# Patient Record
Sex: FEMALE | Race: OTHER | Employment: OTHER | URBAN - METROPOLITAN AREA
[De-identification: names, ages, dates, MRNs, and addresses within clinical notes are randomized per-mention and may not be internally consistent; named-entity substitution may affect disease eponyms.]

---

## 2017-01-13 ENCOUNTER — OFFICE VISIT (OUTPATIENT)
Dept: INTERNAL MEDICINE CLINIC | Age: 77
End: 2017-01-13

## 2017-01-13 VITALS
DIASTOLIC BLOOD PRESSURE: 46 MMHG | SYSTOLIC BLOOD PRESSURE: 105 MMHG | BODY MASS INDEX: 23.53 KG/M2 | HEART RATE: 70 BPM | RESPIRATION RATE: 16 BRPM | WEIGHT: 132.8 LBS | HEIGHT: 63 IN | TEMPERATURE: 98.4 F | OXYGEN SATURATION: 98 %

## 2017-01-13 DIAGNOSIS — E03.1 CONGENITAL HYPOTHYROIDISM WITHOUT GOITER: ICD-10-CM

## 2017-01-13 DIAGNOSIS — I10 HYPERTENSION, ESSENTIAL, BENIGN: ICD-10-CM

## 2017-01-13 DIAGNOSIS — E11.9 TYPE 2 DIABETES MELLITUS WITHOUT COMPLICATION, WITHOUT LONG-TERM CURRENT USE OF INSULIN (HCC): Primary | ICD-10-CM

## 2017-01-13 NOTE — PROGRESS NOTES
SUBJECTIVE:   Ms. Shanika Nielsen is a 64 y.o. female who is here for follow up of htn, hld, and DM. Pt is accompanied by a friend today. Pt states her fasting blood glucose was 100 this morning and 94 yesterday. Pt reports occasionally checking her blood sugar in the evening. Pt claims she has not gained weight. Pt states she stays hungry despite eating a lot. Pt's weight has remained stable since 10/2016. Pt notes she used to weight 160 lbs. Pt's BP is well controlled at 105/46 today. Pt denies dizziness, but reports occasional lightheadedness. Pt reports she was only prescribed 2.5mg Lisinopril because she was already taking Metoprolol. Pt states she has a BP cuff at home. Pt endorses seeing Dr. Bao Mcdaniels (cardiology) on 10/18/16. Pt denies f/u for Holter monitor, but states she continues to have occasional palpitations. Pt's friend states someone told her she would wear the monitor for a few days, then someone else told her a few months, then the doctor said a few weeks. At this time, she is otherwise doing well and has brought no other complaints to my attention today. For a list of the medical issues addressed today, see the assessment and plan below. PMH:   Past Medical History   Diagnosis Date    Cancer ([facility]      skin    Diabetes ([facility]     Hypertension     Hypothyroidism 1993     PSH:  has a past surgical history that includes partial thyroidectomy (1993) and orthopaedic. All: has No Known Allergies. MEDS:   Current Outpatient Prescriptions   Medication Sig    levothyroxine (SYNTHROID) 50 mcg tablet TAKE 1 TABLET BY MOUTH DAILY BEFORE BREAKFAST    glipiZIDE SR (GLUCOTROL) 5 mg CR tablet Take 1 Tab by mouth daily.  metoprolol tartrate (LOPRESSOR) 50 mg tablet Take 1 Tab by mouth two (2) times a day.  glucose blood VI test strips (ACCU-CHEK ALIRIO PLUS TEST STRP) strip Check blood glucose levels twice daily.      No current facility-administered medications for this visit. FH: family history is not on file. SH:  reports that she has never smoked. She has never used smokeless tobacco. She reports that she does not drink alcohol or use illicit drugs. Review of Systems - History obtained from the patient  General ROS: negative  Psychological ROS: negative  Ophthalmic ROS: negative  ENT ROS: negative  Respiratory ROS: no cough, shortness of breath, or wheezing  Cardiovascular ROS: no chest pain or dyspnea on exertion  Gastrointestinal ROS: no abdominal pain, change in bowel habits, or black or bloody stools  Genito-Urinary ROS: negative  Musculoskeletal ROS: negative  Neurological ROS: negative  Dermatological ROS: negative    OBJECTIVE:   Vitals:   Visit Vitals    /46 (BP 1 Location: Left arm, BP Patient Position: Sitting)    Pulse 70    Temp 98.4 °F (36.9 °C) (Oral)    Resp 16    Ht 5' 3\" (1.6 m)    Wt 132 lb 12.8 oz (60.2 kg)    SpO2 98%    BMI 23.52 kg/m2      Gen: Pleasant 64 y.o.  female in NAD. HEENT: NC/AT. HEART: RRR, No M/G/R. LUNGS: CTAB No W/R. EXTREMITIES: Warm. No C/C/E. NEURO: Alert and oriented x 3. Cranial nerves grossly intact. No focal sensory or motor deficits noted. SKIN: Warm. Dry. No rashes or other lesions noted. ASSESSMENT/ PLAN:     Reilly Gonzalez was seen today for thyroid problem and diabetes. Diagnoses and all orders for this visit:    Type 2 diabetes mellitus without complication, without long-term current use of insulin (HCC)  -     METABOLIC PANEL, COMPREHENSIVE  -     MICROALBUMIN, UR, RAND W/ MICROALBUMIN/CREA RATIO  -     HEMOGLOBIN A1C WITH EAG    Hypertension, essential, benign  -     METABOLIC PANEL, COMPREHENSIVE    Congenital hypothyroidism without goiter  -     METABOLIC PANEL, COMPREHENSIVE  -     T4, FREE  -     TSH 3RD GENERATION      Pt was instructed to hold Lisinopril due to low BP.  I advised the pt to start checking her home BP twice daily at different time intervals and to report this data back to me so I can monitor her BP. Pt was advised to f/u with cardiology for Holter monitor due to palpitations. I ordered CMP, microalbumin, Hgb A1C, T4, and TSH labs for monitoring of hypothyroid, medication, and DM. Pt will f/u in 3 months for DM. Follow-up Disposition:  Return in about 3 months (around 4/13/2017) for follow up diabetes. I have reviewed the patient's medications and risks/side effects/benefits were discussed. Diagnosis(-es) explained to patient and questions answered. Literature provided where appropriate.      Written by Shirley Rivers, as dictated by Leslye Aponte MD.

## 2017-01-13 NOTE — PROGRESS NOTES
Pily Clemente is a 64 y.o. female  Chief Complaint   Patient presents with    Thyroid Problem    Diabetes     1. Have you been to an emergency room, urgent clinic, or hospitalized since your last visit? NO  If yes, where when, and reason for visit? 2. Have seen or consulted any other health care provider since your last visit? NO  Please include any pap smears or colon screening in this section  If yes, where when, and reason for visit? 6. Do you have an Advanced Directive/ Living Will in place?  YES  If yes, do we have a copy on file NO  If no, would you like information NO    Last eye exam: about 3 years  Last foot exam: no podiatry hx

## 2017-01-13 NOTE — MR AVS SNAPSHOT
Visit Information Date & Time Provider Department Dept. Phone Encounter #  
 1/13/2017 10:15 AM Renny Beavers, [address] [id number] Follow-up Instructions Return in about 3 months (around 4/13/2017) for follow up diabetes. Upcoming Health Maintenance Date Due  
 FOOT EXAM Q1 10/31/1950 EYE EXAM RETINAL OR DILATED Q1 10/31/1948 DTaP/Tdap/Td series (1 - Tdap) 10/31/1959 ZOSTER VACCINE AGE 60> 10/31/1998 GLAUCOMA SCREENING Q2Y 10/31/2003 OSTEOPOROSIS SCREENING (DEXA) 10/31/2003 Pneumococcal 65+ Low/Medium Risk (1 of 2 - PCV13) 10/31/2003 MICROALBUMIN Q1 12/4/2016 LIPID PANEL Q1 12/4/2016 HEMOGLOBIN A1C Q6M 1/26/2017 MEDICARE YEARLY EXAM 1/14/2017 Allergies as of 1/13/2017  Review Complete On: 1/13/2017 By: Renny Beavers MD  
 No Known Allergies Current Immunizations  Never Reviewed No immunizations on file. Not reviewed this visit You Were Diagnosed With   
  
 Codes Comments Type 2 diabetes mellitus without complication, without long-term current use of insulin (HCC)    -  Primary ICD-10-CM: E11.9 ICD-9-CM: 250.00 Hypertension, essential, benign     ICD-10-CM: I10 
ICD-9-CM: 401.1 Congenital hypothyroidism without goiter     ICD-10-CM: E03.1 ICD-9-CM: 540 Vitals BP Pulse Temp Resp Height(growth percentile) Weight(growth percentile) 105/46 (BP 1 Location: Left arm, BP Patient Position: Sitting) 70 98.4 °F (36.9 °C) (Oral) 16 5' 3\" (1.6 m) 132 lb 12.8 oz (60.2 kg) SpO2 BMI OB Status Smoking Status 98% 23.52 kg/m2 Postmenopausal Never Smoker BMI and BSA Data Body Mass Index Body Surface Area  
 23.52 kg/m 2 1.64 m 2 Preferred Pharmacy Pharmacy Name Phone [facility] [address]  [place] [phone] Your Updated Medication List  
  
   
 This list is accurate as of: 1/13/17 11:16 AM.  Always use your most recent med list.  
  
  
  
  
 glipiZIDE SR 5 mg CR tablet Commonly known as:  GLUCOTROL XL Take 1 Tab by mouth daily. glucose blood VI test strips strip Commonly known as:  ACCU-CHEK ALIRIO PLUS TEST STRP Check blood glucose levels twice daily. levothyroxine 50 mcg tablet Commonly known as:  SYNTHROID  
TAKE 1 TABLET BY MOUTH DAILY BEFORE BREAKFAST  
  
 metoprolol tartrate 50 mg tablet Commonly known as:  LOPRESSOR Take 1 Tab by mouth two (2) times a day. We Performed the Following HEMOGLOBIN A1C WITH EAG [60038 CPT(R)] METABOLIC PANEL, COMPREHENSIVE [86954 CPT(R)] MICROALBUMIN, UR, RAND W/ MICROALBUMIN/CREA RATIO [id number] CPT(R)] T4, FREE S993587 CPT(R)] TSH 3RD GENERATION [39388 CPT(R)] Follow-up Instructions Return in about 3 months (around 4/13/2017) for follow up diabetes. Introducing [facility]! [facility] introduces [employer] patient portal. Now you can access parts of your medical record, email your doctor's office, and request medication refills online. 1. In your internet browser, go to [email or web address] 2. Click on the First Time User? Click Here link in the Sign In box. You will see the New Member Sign Up page. 3. Enter your [employer] Access Code exactly as it appears below. You will not need to use this code after youve completed the sign-up process. If you do not sign up before the expiration date, you must request a new code. · [employer] Access Code: [email or web address] Expires: 1/16/2017  8:59 AM 
 
4. Enter the last four digits of your Social Security Number (xxxx) and Date of Birth (mm/dd/yyyy) as indicated and click Submit. You will be taken to the next sign-up page. 5. Create a [employer] ID. This will be your [employer] login ID and cannot be changed, so think of one that is secure and easy to remember. 6. Create a [employer] password. You can change your password at any time. 7. Enter your Password Reset Question and Answer. This can be used at a later time if you forget your password. 8. Enter your e-mail address. You will receive e-mail notification when new information is available in [address] 9. Click Sign Up. You can now view and download portions of your medical record. 10. Click the Download Summary menu link to download a portable copy of your medical information. If you have questions, please visit the Frequently Asked Questions section of the [employer] website. Remember, [employer] is NOT to be used for urgent needs. For medical emergencies, dial 911. Now available from your iPhone and Android! Please provide this summary of care documentation to your next provider. Your primary care clinician is listed as Alfredo Thuan. If you have any questions after today's visit, please call [phone].

## 2017-01-14 LAB
ALBUMIN SERPL-MCNC: 4 G/DL (ref 3.5–4.8)
ALBUMIN/CREAT UR: 7.4 MG/G CREAT (ref 0–30)
ALBUMIN/GLOB SERPL: 1.5 {RATIO} (ref 1.1–2.5)
ALP SERPL-CCNC: 67 IU/L (ref 39–117)
ALT SERPL-CCNC: 19 IU/L (ref 0–32)
AST SERPL-CCNC: 18 IU/L (ref 0–40)
BILIRUB SERPL-MCNC: <0.2 MG/DL (ref 0–1.2)
BUN SERPL-MCNC: 19 MG/DL (ref 8–27)
BUN/CREAT SERPL: 33 (ref 11–26)
CALCIUM SERPL-MCNC: 10.1 MG/DL (ref 8.7–10.3)
CHLORIDE SERPL-SCNC: 102 MMOL/L (ref 96–106)
CO2 SERPL-SCNC: 24 MMOL/L (ref 18–29)
CREAT SERPL-MCNC: 0.58 MG/DL (ref 0.57–1)
CREAT UR-MCNC: 84 MG/DL
EST. AVERAGE GLUCOSE BLD GHB EST-MCNC: 194 MG/DL
GLOBULIN SER CALC-MCNC: 2.7 G/DL (ref 1.5–4.5)
GLUCOSE SERPL-MCNC: 211 MG/DL (ref 65–99)
HBA1C MFR BLD: 8.4 % (ref 4.8–5.6)
MICROALBUMIN UR-MCNC: 6.2 UG/ML
POTASSIUM SERPL-SCNC: 4.4 MMOL/L (ref 3.5–5.2)
PROT SERPL-MCNC: 6.7 G/DL (ref 6–8.5)
SODIUM SERPL-SCNC: 141 MMOL/L (ref 134–144)
T4 FREE SERPL-MCNC: 1.54 NG/DL (ref 0.82–1.77)
TSH SERPL DL<=0.005 MIU/L-ACNC: 0.16 UIU/ML (ref 0.45–4.5)

## 2017-01-17 ENCOUNTER — TELEPHONE (OUTPATIENT)
Dept: INTERNAL MEDICINE CLINIC | Age: 77
End: 2017-01-17

## 2017-01-17 NOTE — TELEPHONE ENCOUNTER
Per PCP, patient's family needs to confirm that patient has cardiology appointment. Only person listed on HIPAA form is Alethatin Morales. There is no number listed for Stevenson Regalado. Called home number 2x. No answer. Left voicemail advising to call back.

## 2017-01-17 NOTE — TELEPHONE ENCOUNTER
Patient returned RN's call. Stated repeatedly that she would be calling Dr. Tash Erwin for appointment.

## 2017-01-20 NOTE — PROGRESS NOTES
Please call this patient and review the results. Normal electrolyte levels except for a elevation in glucose levels, normal renal, and liver function. Thyroid- patient has a normal T4 and a low TSH. Please advise patient to take 1/2 tab of her synthroid on Sat and Sun. A whole tab daily the rest of the week. Your current hgbA1c is slightly higher than your last level of 8.2. Increase glipizide to 1 1/2 tabs daily ( 7.5 mg). Work on following a diabetic diet and take medication daily. Recheck this test: hgbA1c  in  3 months. Continue with current  medications.

## 2017-01-24 NOTE — PROGRESS NOTES
Reviewed results with patient per Dr. Jacobo Kelly. I have reviewed the provider's instructions with the patient, answering all questions to her satisfaction.

## 2017-02-01 DIAGNOSIS — E03.1 CONGENITAL HYPOTHYROIDISM WITHOUT GOITER: ICD-10-CM

## 2017-02-01 RX ORDER — LEVOTHYROXINE SODIUM 50 UG/1
TABLET ORAL
Qty: 90 TAB | Refills: 0 | Status: SHIPPED | OUTPATIENT
Start: 2017-02-01 | End: 2017-05-24 | Stop reason: SDUPTHER

## 2017-02-17 DIAGNOSIS — R00.2 PALPITATIONS: ICD-10-CM

## 2017-02-17 DIAGNOSIS — I10 ESSENTIAL HYPERTENSION: Primary | ICD-10-CM

## 2017-02-20 ENCOUNTER — HOSPITAL ENCOUNTER (OUTPATIENT)
Dept: NON INVASIVE DIAGNOSTICS | Age: 77
Discharge: HOME | End: 2017-02-20
Attending: PHYSICIAN ASSISTANT
Payer: MEDICARE

## 2017-02-20 ENCOUNTER — TELEPHONE (OUTPATIENT)
Dept: CARDIOLOGY CLINIC | Age: 77
End: 2017-02-20

## 2017-02-20 DIAGNOSIS — R00.2 PALPITATIONS: ICD-10-CM

## 2017-02-20 DIAGNOSIS — I10 ESSENTIAL HYPERTENSION: ICD-10-CM

## 2017-02-20 PROCEDURE — 93270 REMOTE 30 DAY ECG REV/REPORT: CPT

## 2017-02-20 NOTE — TELEPHONE ENCOUNTER
Patient returned call from 11/14/17 regarding incomplete Event Recorder. Patient stated \"she's scheduled for an appointment for 2/20/17 at 2:15 pm\".

## 2017-03-24 NOTE — PROCEDURES
[address]       Name:  Thea Barry   MR#:  [id number]   :  1940   Account #:  [de-identified]        Date of Adm:  2017       REASON FOR EVENT MONITOR: Palpitations. 1. An event monitor was obtained from 2017 to 2017. The   initial rhythm strip revealed sinus rhythm on 2017 at 14:56.   2. A rhythm strip was obtained on 2017 at 11:54 a.m. The   rhythm strip revealed sinus rhythm ranging from 90 to 115 beats per   minute. There was atrial bigeminy over 6 seconds. 3. The rhythm strip was obtained at 11:51 on 2017. The rhythm   strip revealed sinus tachycardia of 115 beats per minute in a single   ventricular premature contraction versus an aberrant atrial premature   contractions. 4. A rhythm strip was obtained on 2017 11:53 a.m. The rhythm   strip revealed sinus rhythm at 75 beats per minute, and atrial couplets   and an atrial triplet. 5. The patient's symptoms of palpitations corresponded to the atrial   ectopy.          MD REJI Rose / Reilly Metzger   D:  2017   10:39   T:  2017   11:08   Job #:  [id number]

## 2017-04-05 ENCOUNTER — OFFICE VISIT (OUTPATIENT)
Dept: CARDIOLOGY CLINIC | Age: 77
End: 2017-04-05

## 2017-04-05 VITALS
WEIGHT: 130 LBS | HEIGHT: 63 IN | BODY MASS INDEX: 23.04 KG/M2 | DIASTOLIC BLOOD PRESSURE: 67 MMHG | SYSTOLIC BLOOD PRESSURE: 147 MMHG | HEART RATE: 97 BPM | OXYGEN SATURATION: 97 %

## 2017-04-05 DIAGNOSIS — R00.2 INTERMITTENT PALPITATIONS: ICD-10-CM

## 2017-04-05 DIAGNOSIS — I10 HYPERTENSION, ESSENTIAL, BENIGN: ICD-10-CM

## 2017-04-05 DIAGNOSIS — R09.89 LABILE BLOOD PRESSURE: Primary | ICD-10-CM

## 2017-04-05 DIAGNOSIS — E11.9 TYPE 2 DIABETES MELLITUS WITHOUT COMPLICATION, WITHOUT LONG-TERM CURRENT USE OF INSULIN (HCC): ICD-10-CM

## 2017-04-05 NOTE — PROGRESS NOTES
[facility]   [address] [facility], [address] [place] [address]                                          NEW PATIENT HPI/FOLLOW-UP      NAME:  Анна Pal   :   1940   MRN:   [id number]   PCP:  Laila Weeks MD           Subjective: The patient is a 64y.o. year old female  who returns for a routine follow-up re: results of event monitoring. Since the last visit, patient reports she still has occasional palpitations. She notes BPs that are high and low and feels lightheaded with vision changes at times. States her PCP has taken her off of BP medications and is readjusting her Synthroid due to recent labs. She admits to heavy consumption of caffeine; mostly tea and coffee. Denies change in exercise tolerance, chest pain, edema, medication intolerance,  shortness of breath, PND/orthopnea wheezing, sputum, or syncope. Doing satisfactorily. Review of Systems  General: Pt denies excessive weight gain or loss. Pt is able to conduct ADL's. Respiratory: Denies shortness of breath, [place], wheezing or stridor.   Cardiovascular: +palpitations Denies precordial pain, , edema or PND  Gastrointestinal: Denies poor appetite, indigestion, abdominal pain or blood in stool  Peripheral vascular: Denies claudication, leg cramps  Neuropsychiatric: Denies paresthesias,tingling,numbness,anxiety,depression,fatigue  Musculoskeletal: Denies pain,tenderness, soreness,swelling      Past Medical History:   Diagnosis Date    Cancer ([address]     skin    Diabetes ([address]     Hypertension     Hypothyroidism      Patient Active Problem List    Diagnosis Date Noted    Unintended weight loss 10/18/2016    Hypertension, essential, benign 2015    Type 2 diabetes mellitus without complication ([address]     Congenital hypothyroidism without goiter 2015      Past Surgical History:   Procedure Laterality Date    HX ORTHOPAEDIC      rotator cuff repair- left    HX PARTIAL THYROIDECTOMY   No Known Allergies   No family history on file. Social History     Social History    Marital status:      Spouse name: N/A    Number of children: N/A    Years of education: N/A     Occupational History    Not on file. Social History Main Topics    Smoking status: Never Smoker    Smokeless tobacco: Never Used    Alcohol use No    Drug use: No    Sexual activity: No     Other Topics Concern    Not on file     Social History Narrative      Current Outpatient Prescriptions   Medication Sig    levothyroxine (SYNTHROID) 50 mcg tablet TAKE 1 TABLET BY MOUTH DAILY BEFORE BREAKFAST    glipiZIDE SR (GLUCOTROL) 5 mg CR tablet Take 1 Tab by mouth daily.  glucose blood VI test strips (ACCU-CHEK ALIRIO PLUS TEST STRP) strip Check blood glucose levels twice daily.  metoprolol tartrate (LOPRESSOR) 50 mg tablet Take 1 Tab by mouth two (2) times a day. No current facility-administered medications for this visit. I have reviewed the MAs notes, vitals, problem list, allergy list, medical history, family medical, social history and medications. Objective:     Physical Exam:     Vitals:    04/05/17 1125   BP: 147/67   Pulse: 97   SpO2: 97%   Weight: 130 lb (59 kg)   Height: 5' 3\" (1.6 m)    Body mass index is 23.03 kg/(m^2). General: WDWN elderly female,thin,frail in no acute distress. Pleasant affect. HEENT: No carotid bruits, no JVD, trach is midline. Heart:  Normal S1/S2 negative S3 or S4. Regular, no murmur, gallop or rub.   Respiratory: Clear bilaterally, no wheezing or rales  Abdomen:   Soft, non-tender, bowel sounds are active.   Extremities:  No edema, normal cap refill, no cyanosis. Neuro: A&Ox3, speech clear, unsteady gait. Skin: Skin color is normal. No rashes or lesions. No diaphoresis.   Vascular: 2+ pulses symmetric in all extremities      Data Review:       Cardiographics:    EKG: None today    Cardiology Labs:    No results found for this or any previous visit. Lab Results   Component Value Date/Time    Cholesterol, total 230 12/04/2015 09:55 AM    HDL Cholesterol 52 12/04/2015 09:55 AM    LDL, calculated 168 12/04/2015 09:55 AM    Triglyceride 49 12/04/2015 09:55 AM       Lab Results   Component Value Date/Time    Sodium 141 01/13/2017 11:25 AM    Potassium 4.4 01/13/2017 11:25 AM    Chloride 102 01/13/2017 11:25 AM    CO2 24 01/13/2017 11:25 AM    Glucose 211 01/13/2017 11:25 AM    BUN 19 01/13/2017 11:25 AM    Creatinine 0.58 01/13/2017 11:25 AM    BUN/Creatinine ratio 33 01/13/2017 11:25 AM    GFR est  01/13/2017 11:25 AM    GFR est non-AA 89 01/13/2017 11:25 AM    Calcium 10.1 01/13/2017 11:25 AM    Bilirubin, total <0.2 01/13/2017 11:25 AM    AST (SGOT) 18 01/13/2017 11:25 AM    Alk. phosphatase 67 01/13/2017 11:25 AM    Protein, total 6.7 01/13/2017 11:25 AM    Albumin 4.0 01/13/2017 11:25 AM    A-G Ratio 1.5 01/13/2017 11:25 AM    ALT (SGPT) 19 01/13/2017 11:25 AM          Assessment:       ICD-10-CM ICD-9-CM    1. Labile blood pressure R09.89 796.2    2. Type 2 diabetes mellitus without complication, without long-term current use of insulin (HCC) E11.9 250.00    3. Intermittent palpitations R00.2 785.1    4. Hypertension, essential, benign I10 401.1          Discussion: Patient presents at this time stable from a cardiac perspective. Event monitoring showed atrial ectopy coinciding with symptoms and one episode of sinus tachycardia (114 bpm) with single ectopic beat. Labile blood pressures noted on home monitoring. Pt has been removed from BP meds by PCP and will follow up with her next week to readjust thyroid medications. Likely suffering from autonomic dysfunction. Reassured along with family member that it better to allow relatively wide swings in SBP(brought diary with her) range 116 to 160's to avoid symptomatic orthostatic changes due to\" stiff \" blood vessels due to age and associated co-morbidities. Did not tolerate BB.  If much high then low dose ARB or amlodipine as vasodilator. PAC's benign and non-consequential on EM. Advised continued control of DM and dyslipidemia. Plan: 1. Continue same meds. Lipid profile and labs followed by PCP. -- May benefit from endocrinology evaluation    2. Encouraged to exercise to tolerance, lose weight, and follow low fat, low cholesterol, low sodium predominantly Plant-based (consider Mediterranean) diet. Call with questions or concerns. Will follow up any test results by phone and/or f/u here in office if needed. Sienna Blanco 3.Follow up: 6 months and if stable - 1 year as pt concerned about co-pay. I have discussed the diagnosis with the patient and the intended plan as seen in the above orders. The patient has received an after-visit summary and questions were answered concerning future plans. I have discussed any concerning medication side effects and warnings with the patient as well. Patient seen and examined. All pertinent data reviewed. I have reviewed detailed note as outlined by Sonal Castillo. Case discussed with Nursing/medical assistant staff and Sonal Castillo. Patient with labile BP's due to \"non-pliable\"  blood vessels in association with autonomic dysfunction due to DM. Would favor hand-off approach for now as discussed with patient and family member. PAC's benign and non-lethal though bothersome and a nuisance. Have suggested decaff beverages and note response. Call with response. Plans as outlined.     Cj Dawson PA-C  4/5/2017     JENNIFER Juan

## 2017-04-05 NOTE — PATIENT INSTRUCTIONS
-- Please continue your planned follow up with Dr. Naveen Coleman  -- We will see you back here in 1 year       Palpitations: Care Instructions  Your Care Instructions    Heart palpitations are the uncomfortable sensation that your heart is beating fast or irregularly. You might feel pounding or fluttering in your chest. It might feel like your heart is skipping a beat. Although palpitations may be caused by a heart problem, they also occur because of stress, fatigue, or use of alcohol, caffeine, or nicotine. Many medicines, including diet pills, antihistamines, decongestants, and some herbal products, can cause heart palpitations. Nearly everyone has palpitations from time to time. Depending on your symptoms, your doctor may need to do more tests to try to find the cause of your palpitations. Follow-up care is a key part of your treatment and safety. Be sure to make and go to all appointments, and call your doctor if you are having problems. It's also a good idea to know your test results and keep a list of the medicines you take. How can you care for yourself at home? · Avoid caffeine, nicotine, and excess alcohol. · Do not take illegal drugs, such as methamphetamines and cocaine. · Do not take weight loss or diet medicines unless you talk with your doctor first.  · Get plenty of sleep. · Do not overeat. · If you have palpitations again, take deep breaths and try to relax. This may slow a racing heart. · If you start to feel lightheaded, lie down to avoid injuries that might result if you pass out and fall down. · Keep a record of your palpitations and bring it to your next doctor's appointment. Write down:  ¨ The date and time. ¨ Your pulse. (If your heart is beating fast, it may be hard to count your pulse.)  ¨ What you were doing when the palpitations started. ¨ How long the palpitations lasted. ¨ Any other symptoms. · If an activity causes palpitations, slow down or stop.  Talk to your doctor before you do that activity again. · Take your medicines exactly as prescribed. Call your doctor if you think you are having a problem with your medicine. When should you call for help? Call 911 anytime you think you may need emergency care. For example, call if:  · You passed out (lost consciousness). · You have symptoms of a heart attack. These may include:  ¨ Chest pain or pressure, or a strange feeling in the chest.  ¨ Sweating. ¨ Shortness of breath. ¨ Pain, pressure, or a strange feeling in the back, neck, jaw, or upper belly or in one or both shoulders or arms. ¨ Lightheadedness or sudden weakness. ¨ A fast or irregular heartbeat. After you call 911, the  may tell you to chew 1 adult-strength or 2 to 4 low-dose aspirin. Wait for an ambulance. Do not try to drive yourself. · You have symptoms of a stroke. These may include:  ¨ Sudden numbness, tingling, weakness, or loss of movement in your face, arm, or leg, especially on only one side of your body. ¨ Sudden vision changes. ¨ Sudden trouble speaking. ¨ Sudden confusion or trouble understanding simple statements. ¨ Sudden problems with walking or balance. ¨ A sudden, severe headache that is different from past headaches. Call your doctor now or seek immediate medical care if:  · You have heart palpitations and:  ¨ Are dizzy or lightheaded, or you feel like you may faint. ¨ Have new or increased shortness of breath. Watch closely for changes in your health, and be sure to contact your doctor if:  · You continue to have heart palpitations. Where can you learn more? Go to http://alejandrina-vee.info/. Enter R508 in the search box to learn more about \"Palpitations: Care Instructions. \"  Current as of: January 27, 2016  Content Version: 11.2  © 7230-2220 [employer]. Care instructions adapted under license by [employer] (which disclaims liability or warranty for this information).  If you have questions about a medical condition or this instruction, always ask your healthcare professional. [place] [address] any warranty or liability for your use of this information.

## 2017-04-05 NOTE — MR AVS SNAPSHOT
Visit Information Date & Time Provider Department Dept. Phone Encounter #  
 4/5/2017 10:45 AM MD Telly Singh [facility] [place] [address] [id number] Your Appointments 4/12/2017 11:45 AM  
ROUTINE CARE with Pierre Rosas MD  
[facility]) Appt Note: 3 month follow up  
 [facility] [address]  
  
   
 [facility] [address] [facility]  [address] [place]  
  
    
 10/10/2017 10:00 AM  
ESTABLISHED PATIENT with MD Telly Singh [facility]) Appt Note: 6 MO. F/U  
 [address] [place]  
[phone] [address] Upcoming Health Maintenance Date Due  
 FOOT EXAM Q1 10/31/1950 EYE EXAM RETINAL OR DILATED Q1 10/31/1948 DTaP/Tdap/Td series (1 - Tdap) 10/31/1959 ZOSTER VACCINE AGE 60> 10/31/1998 GLAUCOMA SCREENING Q2Y 10/31/2003 OSTEOPOROSIS SCREENING (DEXA) 10/31/2003 Pneumococcal 65+ Low/Medium Risk (1 of 2 - PCV13) 10/31/2003 LIPID PANEL Q1 12/4/2016 MEDICARE YEARLY EXAM 1/14/2017 HEMOGLOBIN A1C Q6M 7/13/2017 MICROALBUMIN Q1 1/13/2018 Allergies as of 4/5/2017  Review Complete On: 1/13/2017 By: Pierre Rosas MD  
 No Known Allergies Current Immunizations  Never Reviewed No immunizations on file. Not reviewed this visit You Were Diagnosed With   
  
 Codes Comments Labile blood pressure    -  Primary ICD-10-CM: R09.89 ICD-9-CM: 796.2 Type 2 diabetes mellitus without complication, without long-term current use of insulin (HCC)     ICD-10-CM: E11.9 ICD-9-CM: 250.00 Intermittent palpitations     ICD-10-CM: R00.2 ICD-9-CM: 785.1 Vitals BP Pulse Height(growth percentile) Weight(growth percentile) SpO2 BMI 147/67 97 5' 3\" (1.6 m) 130 lb (59 kg) 97% 23.03 kg/m2 OB Status Smoking Status Postmenopausal Never Smoker Vitals History BMI and BSA Data Body Mass Index Body Surface Area 23.03 kg/m 2 1.62 m 2 Preferred Pharmacy Pharmacy Name Phone Kalyan Duarte [address]  [place] [phone] Your Updated Medication List  
  
   
This list is accurate as of: 4/5/17 12:16 PM.  Always use your most recent med list.  
  
  
  
  
 glipiZIDE SR 5 mg CR tablet Commonly known as:  GLUCOTROL XL Take 1 Tab by mouth daily. glucose blood VI test strips strip Commonly known as:  ACCU-CHEK ALIRIO PLUS TEST STRP Check blood glucose levels twice daily. levothyroxine 50 mcg tablet Commonly known as:  SYNTHROID  
TAKE 1 TABLET BY MOUTH DAILY BEFORE BREAKFAST  
  
 metoprolol tartrate 50 mg tablet Commonly known as:  LOPRESSOR Take 1 Tab by mouth two (2) times a day. Patient Instructions -- Please continue your planned follow up with Dr. Issac Early 
-- We will see you back here in 1 year Palpitations: Care Instructions Your Care Instructions Heart palpitations are the uncomfortable sensation that your heart is beating fast or irregularly. You might feel pounding or fluttering in your chest. It might feel like your heart is skipping a beat. Although palpitations may be caused by a heart problem, they also occur because of stress, fatigue, or use of alcohol, caffeine, or nicotine. Many medicines, including diet pills, antihistamines, decongestants, and some herbal products, can cause heart palpitations. Nearly everyone has palpitations from time to time. Depending on your symptoms, your doctor may need to do more tests to try to find the cause of your palpitations. Follow-up care is a key part of your treatment and safety.  Be sure to make and go to all appointments, and call your doctor if you are having problems. It's also a good idea to know your test results and keep a list of the medicines you take. How can you care for yourself at home? · Avoid caffeine, nicotine, and excess alcohol. · Do not take illegal drugs, such as methamphetamines and cocaine. · Do not take weight loss or diet medicines unless you talk with your doctor first. 
· Get plenty of sleep. · Do not overeat. · If you have palpitations again, take deep breaths and try to relax. This may slow a racing heart. · If you start to feel lightheaded, lie down to avoid injuries that might result if you pass out and fall down. · Keep a record of your palpitations and bring it to your next doctor's appointment. Write down: ¨ The date and time. ¨ Your pulse. (If your heart is beating fast, it may be hard to count your pulse.) ¨ What you were doing when the palpitations started. ¨ How long the palpitations lasted. ¨ Any other symptoms. · If an activity causes palpitations, slow down or stop. Talk to your doctor before you do that activity again. · Take your medicines exactly as prescribed. Call your doctor if you think you are having a problem with your medicine. When should you call for help? Call 911 anytime you think you may need emergency care. For example, call if: 
· You passed out (lost consciousness). · You have symptoms of a heart attack. These may include: ¨ Chest pain or pressure, or a strange feeling in the chest. 
¨ Sweating. ¨ Shortness of breath. ¨ Pain, pressure, or a strange feeling in the back, neck, jaw, or upper belly or in one or both shoulders or arms. ¨ Lightheadedness or sudden weakness. ¨ A fast or irregular heartbeat. After you call 911, the  may tell you to chew 1 adult-strength or 2 to 4 low-dose aspirin. Wait for an ambulance. Do not try to drive yourself. · You have symptoms of a stroke. These may include: ¨ Sudden numbness, tingling, weakness, or loss of movement in your face, arm, or leg, especially on only one side of your body. ¨ Sudden vision changes. ¨ Sudden trouble speaking. ¨ Sudden confusion or trouble understanding simple statements. ¨ Sudden problems with walking or balance. ¨ A sudden, severe headache that is different from past headaches. Call your doctor now or seek immediate medical care if: 
· You have heart palpitations and: ¨ Are dizzy or lightheaded, or you feel like you may faint. ¨ Have new or increased shortness of breath. Watch closely for changes in your health, and be sure to contact your doctor if: 
· You continue to have heart palpitations. Where can you learn more? Go to http://alejandrina-vee.info/. Enter R508 in the search box to learn more about \"Palpitations: Care Instructions. \" Current as of: January 27, 2016 Content Version: 11.2 © 7554-7962 [employer]. Care instructions adapted under license by [employer] (which disclaims liability or warranty for this information). If you have questions about a medical condition or this instruction, always ask your healthcare professional. [place] [address] any warranty or liability for your use of this information. Introducing [facility] & [facility]! oSledad Porter introduces Forward Talent patient portal. Now you can access parts of your medical record, email your doctor's office, and request medication refills online. 1. In your internet browser, go to [email or web address] 2. Click on the First Time User? Click Here link in the Sign In box. You will see the New Member Sign Up page. 3. Enter your Forward Talent Access Code exactly as it appears below. You will not need to use this code after youve completed the sign-up process. If you do not sign up before the expiration date, you must request a new code.  
 
· [employer] Access Code: [email or web address] 
 Expires: 5/14/2017  1:33 PM 
 
4. Enter the last four digits of your Social Security Number (xxxx) and Date of Birth (mm/dd/yyyy) as indicated and click Submit. You will be taken to the next sign-up page. 5. Create a LinguaSys ID. This will be your LinguaSys login ID and cannot be changed, so think of one that is secure and easy to remember. 6. Create a LinguaSys password. You can change your password at any time. 7. Enter your Password Reset Question and Answer. This can be used at a later time if you forget your password. 8. Enter your e-mail address. You will receive e-mail notification when new information is available in [address] 9. Click Sign Up. You can now view and download portions of your medical record. 10. Click the Download Summary menu link to download a portable copy of your medical information. If you have questions, please visit the Frequently Asked Questions section of the LinguaSys website. Remember, LinguaSys is NOT to be used for urgent needs. For medical emergencies, dial 911. Now available from your iPhone and Android! Please provide this summary of care documentation to your next provider. Your primary care clinician is listed as Tabatha Magana. If you have any questions after today's visit, please call [phone].

## 2017-04-12 ENCOUNTER — OFFICE VISIT (OUTPATIENT)
Dept: INTERNAL MEDICINE CLINIC | Age: 77
End: 2017-04-12

## 2017-04-12 VITALS
OXYGEN SATURATION: 98 % | HEART RATE: 88 BPM | WEIGHT: 127 LBS | DIASTOLIC BLOOD PRESSURE: 68 MMHG | SYSTOLIC BLOOD PRESSURE: 130 MMHG | TEMPERATURE: 98.6 F | HEIGHT: 63 IN | BODY MASS INDEX: 22.5 KG/M2 | RESPIRATION RATE: 18 BRPM

## 2017-04-12 DIAGNOSIS — E03.1 CONGENITAL HYPOTHYROIDISM WITHOUT GOITER: ICD-10-CM

## 2017-04-12 DIAGNOSIS — E78.2 MIXED HYPERLIPIDEMIA: ICD-10-CM

## 2017-04-12 DIAGNOSIS — E11.9 TYPE 2 DIABETES MELLITUS WITHOUT COMPLICATION, WITHOUT LONG-TERM CURRENT USE OF INSULIN (HCC): Primary | ICD-10-CM

## 2017-04-12 NOTE — PROGRESS NOTES
SUBJECTIVE:   Ms. Wally Goodell is a 64 y.o. female who is here for follow up of DM. Pt is accompanied by a family member today. Pt ate this morning. Pt's BP is elevated at 157/73 but 130/68 upon recheck today. Pt's TSH was 0.163 on 1/13/17. Pt was instructed to take 1/2 tablet of Synthroid on Saturday and Sunday. Pt denies palpitations. Pt has lost 3 lbs since 4/5/17. Pt claims she is constantly hungry. Pt claims she has been eating the same as she always has. Pt reports working in her yard some. Pt endorses drinking water. Pt's Hgb A1C was 8.2 on 1/13/17. At this time, she is otherwise doing well and has brought no other complaints to my attention today. For a list of the medical issues addressed today, see the assessment and plan below. PMH:   Past Medical History:   Diagnosis Date    Cancer ([facility] [facility]     skin    Diabetes ([facility] [facility]     Hypertension     Hypothyroidism 1993     PSH:  has a past surgical history that includes partial thyroidectomy (1993) and orthopaedic. All: has No Known Allergies. MEDS:   Current Outpatient Prescriptions   Medication Sig    levothyroxine (SYNTHROID) 50 mcg tablet TAKE 1 TABLET BY MOUTH DAILY BEFORE BREAKFAST    glipiZIDE SR (GLUCOTROL) 5 mg CR tablet Take 1 Tab by mouth daily.  glucose blood VI test strips (ACCU-CHEK ALIRIO PLUS TEST STRP) strip Check blood glucose levels twice daily.  metoprolol tartrate (LOPRESSOR) 50 mg tablet Take 1 Tab by mouth two (2) times a day. No current facility-administered medications for this visit. FH: family history is not on file. SH:  reports that she has never smoked. She has never used smokeless tobacco. She reports that she does not drink alcohol or use illicit drugs.      Review of Systems - History obtained from the patient  General ROS: negative  Psychological ROS: negative  Ophthalmic ROS: negative  ENT ROS: negative  Respiratory ROS: no cough, shortness of breath, or wheezing  Cardiovascular ROS: no chest pain or dyspnea on exertion  Gastrointestinal ROS: no abdominal pain, change in bowel habits, or black or bloody stools  Genito-Urinary ROS: negative  Musculoskeletal ROS: negative  Neurological ROS: negative  Dermatological ROS: negative    OBJECTIVE:   Vitals:   Visit Vitals    /73    Pulse 88    Temp 98.6 °F (37 °C) (Oral)    Resp 18    Ht 5' 3\" (1.6 m)    Wt 127 lb (57.6 kg)    SpO2 98%    BMI 22.5 kg/m2      Gen: Pleasant 64 y.o.  female in NAD. HEENT: NC/AT. HEART: RRR, No M/G/R. LUNGS: CTAB No W/R. EXTREMITIES: Warm. No C/C/E. NEURO: Alert and oriented x 3. Cranial nerves grossly intact. No focal sensory or motor deficits noted. SKIN: Warm. Dry. No rashes or other lesions noted. ASSESSMENT/ PLAN:   Ghislaine Jett was seen today for diabetes, thyroid problem and other. Diagnoses and all orders for this visit:    Type 2 diabetes mellitus without complication, without long-term current use of insulin (HCC)  -     HEMOGLOBIN A1C WITH EAG    Congenital hypothyroidism without goiter  -     TSH 3RD GENERATION  -     T4, FREE  -     T3 TOTAL    Mixed hyperlipidemia  -     LIPID PANEL      I ordered T4, TSH, and T3 labs for monitoring of elevated TSH. If pt's thyroid levels continue to be abnormal, then I will refer pt to endocrinology. This patient's blood pressure is stable and controlled on the current medication. Continue the current regimen. I ordered an Hgb A1C lab for monitoring of DM. Pt was given a lab order for lipid panel to be done when she is fasting. Pt will f/u in 6 months for DM and hypothyroid. Follow-up Disposition:  Return in about 6 months (around 10/12/2017) for follow up hypothyroid and diabetes. I have reviewed the patient's medications and risks/side effects/benefits were discussed. Diagnosis(-es) explained to patient and questions answered. Literature provided where appropriate.      Written by Cherelle Webber, as dictated by Tiana Mary, MD.

## 2017-04-12 NOTE — MR AVS SNAPSHOT
Visit Information Date & Time Provider Department Dept. Phone Encounter #  
 4/12/2017 11:45 AM Jhon Rider, [address] [id number] Follow-up Instructions Return in about 6 months (around 10/12/2017) for follow up hypothyroid and diabetes. Your Appointments 5/11/2017  2:50 PM  
New Patient with MD Telly [facility] [address]) Appt Note: NP, diabetes and thyroid, referring Dr. Ashish Bang [phone]  
 [address] [id number] [address] [place] [address] Road  
  
    
 10/10/2017 10:00 AM  
ESTABLISHED PATIENT with MD Telly Mike [facility]) Appt Note: 6 MO. F/U  
 [address] [place] [address]  
[phone] [address] Upcoming Health Maintenance Date Due  
 FOOT EXAM Q1 10[birth year]31/1950 EYE EXAM RETINAL OR DILATED Q1 10/31/1948 DTaP/Tdap/Td series (1 - Tdap) 10/31/1959 ZOSTER VACCINE AGE 60> 10/31/1998 GLAUCOMA SCREENING Q2Y 10/31/2003 OSTEOPOROSIS SCREENING (DEXA) 10/31/2003 Pneumococcal 65+ Low/Medium Risk (1 of 2 - PCV13) 10/31/2003 LIPID PANEL Q1 12/4/2016 MEDICARE YEARLY EXAM 1/14/2017 HEMOGLOBIN A1C Q6M 7/13/2017 MICROALBUMIN Q1 1/13/2018 Allergies as of 4/12/2017  Review Complete On: 4/12/2017 By: Jhon Rider MD  
 No Known Allergies Current Immunizations  Never Reviewed No immunizations on file. Not reviewed this visit You Were Diagnosed With   
  
 Codes Comments Type 2 diabetes mellitus without complication, without long-term current use of insulin (HCC)    -  Primary ICD-10-CM: E11.9 ICD-9-CM: 250.00 Congenital hypothyroidism without goiter     ICD-10-CM: E03.1 ICD-9-CM: 518 Mixed hyperlipidemia     ICD-10-CM: E78.2 ICD-9-CM: 272.2 Vitals BP Pulse Temp Resp Height(growth percentile) Weight(growth percentile) 130/68 88 98.6 °F (37 °C) (Oral) 18 5' 3\" (1.6 m) 127 lb (57.6 kg) SpO2 BMI OB Status Smoking Status 98% 22.5 kg/m2 Postmenopausal Never Smoker Vitals History BMI and BSA Data Body Mass Index Body Surface Area  
 22.5 kg/m 2 1.6 m 2 Preferred Pharmacy Pharmacy Name Phone Kalyan Duarte [address]  [place] [phone] Your Updated Medication List  
  
   
This list is accurate as of: 4/12/17 12:37 PM.  Always use your most recent med list.  
  
  
  
  
 glipiZIDE SR 5 mg CR tablet Commonly known as:  GLUCOTROL XL Take 1 Tab by mouth daily. glucose blood VI test strips strip Commonly known as:  ACCU-CHEK ALIRIO PLUS TEST STRP Check blood glucose levels twice daily. levothyroxine 50 mcg tablet Commonly known as:  SYNTHROID  
TAKE 1 TABLET BY MOUTH DAILY BEFORE BREAKFAST  
  
 metoprolol tartrate 50 mg tablet Commonly known as:  LOPRESSOR Take 1 Tab by mouth two (2) times a day. We Performed the Following HEMOGLOBIN A1C WITH EAG [73048 CPT(R)] LIPID PANEL [43588 CPT(R)]   
 T3 TOTAL [14318 CPT(R)] T4, FREE [id number] CPT(R)] TSH 3RD GENERATION [73176 CPT(R)] Follow-up Instructions Return in about 6 months (around 10/12/2017) for follow up hypothyroid and diabetes. Introducing [facility] & HEALTH SERVICES! Audra Key introduces CardMunch patient portal. Now you can access parts of your medical record, email your doctor's office, and request medication refills online. 1. In your internet browser, go to [email or web address] 2. Click on the First Time User? Click Here link in the Sign In box. You will see the New Member Sign Up page. 3. Enter your Fleecs Access Code exactly as it appears below. You will not need to use this code after youve completed the sign-up process. If you do not sign up before the expiration date, you must request a new code. · Fleecs Access Code: [email or web address] Expires: 5/14/2017  1:33 PM 
 
4. Enter the last four digits of your Social Security Number (xxxx) and Date of Birth (mm/dd/yyyy) as indicated and click Submit. You will be taken to the next sign-up page. 5. Create a Fleecs ID. This will be your Fleecs login ID and cannot be changed, so think of one that is secure and easy to remember. 6. Create a Fleecs password. You can change your password at any time. 7. Enter your Password Reset Question and Answer. This can be used at a later time if you forget your password. 8. Enter your e-mail address. You will receive e-mail notification when new information is available in [address] 9. Click Sign Up. You can now view and download portions of your medical record. 10. Click the Download Summary menu link to download a portable copy of your medical information. If you have questions, please visit the Frequently Asked Questions section of the Fleecs website. Remember, Fleecs is NOT to be used for urgent needs. For medical emergencies, dial 911. Now available from your iPhone and Android! Please provide this summary of care documentation to your next provider. Your primary care clinician is listed as Marcial Davidson. If you have any questions after today's visit, please call [phone].

## 2017-04-12 NOTE — PROGRESS NOTES
Chief Complaint   Patient presents with    Diabetes     pt is here for a 3 month f/u    Thyroid Problem     Reviewed record in preparation for visit and have obtained necessary documentation. Identified pt with two pt identifiers(name and ). Chief Complaint   Patient presents with    Diabetes     pt is here for a 3 month f/u    Thyroid Problem       Health Maintenance Due   Topic Date Due    FOOT EXAM Q1  10[birth year]31/1950    EYE EXAM RETINAL OR DILATED Q1  10/31/1948    DTaP/Tdap/Td series (1 - Tdap) 10/31/1959    ZOSTER VACCINE AGE 60>  10/31/1998    GLAUCOMA SCREENING Q2Y  10/31/2003    OSTEOPOROSIS SCREENING (DEXA)  10/31/2003    Pneumococcal 65+ Low/Medium Risk (1 of 2 - PCV13) 10/31/2003    LIPID PANEL Q1  2016    MEDICARE YEARLY EXAM  2017       Coordination of Care Questionnaire:  :     1. Have you been to the ER, urgent care clinic since your last visit? Hospitalized since your last visit?no    2. Have you seen or consulted any other health care providers outside of the [facility] since your last visit? Include any pap smears or colon screening.  no

## 2017-05-24 DIAGNOSIS — E03.1 CONGENITAL HYPOTHYROIDISM WITHOUT GOITER: ICD-10-CM

## 2017-05-25 RX ORDER — LEVOTHYROXINE SODIUM 50 UG/1
TABLET ORAL
Qty: 90 TAB | Refills: 0 | Status: SHIPPED | OUTPATIENT
Start: 2017-05-25 | End: 2017-10-03 | Stop reason: SDUPTHER

## 2017-05-30 ENCOUNTER — OFFICE VISIT (OUTPATIENT)
Dept: ENDOCRINOLOGY | Age: 77
End: 2017-05-30

## 2017-05-30 VITALS
DIASTOLIC BLOOD PRESSURE: 67 MMHG | WEIGHT: 129 LBS | HEIGHT: 63 IN | BODY MASS INDEX: 22.86 KG/M2 | HEART RATE: 106 BPM | SYSTOLIC BLOOD PRESSURE: 140 MMHG

## 2017-05-30 DIAGNOSIS — E11.9 TYPE 2 DIABETES MELLITUS WITHOUT COMPLICATION, WITHOUT LONG-TERM CURRENT USE OF INSULIN (HCC): Primary | ICD-10-CM

## 2017-05-30 DIAGNOSIS — I10 HYPERTENSION, ESSENTIAL, BENIGN: ICD-10-CM

## 2017-05-30 DIAGNOSIS — E03.9 ACQUIRED HYPOTHYROIDISM: ICD-10-CM

## 2017-05-30 NOTE — LETTER
6/1/2017 10:43 AM 
 
Ms. Jennifer [address]osta [address] [address] Dear Cleveland Ferguson: 
 
Please find your most recent results below. Resulted Orders HEMOGLOBIN A1C WITH EAG Result Value Ref Range Hemoglobin A1c 8.3 (H) 4.8 - 5.6 % Comment:  
            Pre-diabetes: 5.7 - 6.4 Diabetes: >6.4 Glycemic control for adults with diabetes: <7.0 Estimated average glucose 192 mg/dL Narrative Performed at:  [address]  [id number] : Padilla Alexander MD, Phone:  [phone] TSH 3RD GENERATION Result Value Ref Range TSH 0.553 0.450 - 4.500 uIU/mL Narrative Performed at:  [address]  [id number] : Padilla Alexander MD, Phone:  [phone] T4, FREE Result Value Ref Range T4, Free 1.38 0.82 - 1.77 ng/dL Narrative Performed at:  [address]  [id number] : Padilla Alexander MD, Phone:  [phone] FRUCTOSAMINE Result Value Ref Range Fructosamine 350 (H) 0 - 285 umol/L Comment:  
   Published reference interval for apparently healthy subjects 
between age 21 and 61 is 1 - 285 umol/L and in a poorly 
controlled diabetic population is 228 - 563 umol/L with a 
mean of 396 umol/L. Narrative Performed at:  [address]  [id number] : Padilla Alexander MD, Phone:  [phone] RECOMMENDATIONS: 
Notes Recorded by Yary Ward MD on 5/31/2017 at 1:33 PM 
 
 
Your A1C came back at 8.3%, which reflects an average blood sugar of 190 for the last 3 months. This makes me think that you are having high blood sugars at some point in the day, because your sugars first thing in the morning are so good.  
 
Check your sugars before lunch, before dinner and at bedtime as we discussed, so we can get an idea of how your sugars are doing during the day. Please mail them here in 3 weeks. Your Thyroid labs look very good, this tells me that the Levothyroxine 50mcg (one whole pill) Mon-Fri and 25mcg (1/2 pill) on Sat and Sunday is the correct dose for you. I spoke with your niece, Chica Garcia and read her the recommendations. Coretta Saleh, Dr. Annette Dale nurse) Please call me if you have any questions: [phone] Sincerely, Lakeshia Ramsay MD

## 2017-05-30 NOTE — LETTER
5/30/2017 2:06 PM 
 
Patient:  Zhane Waddell YOB: 1940 Date of Visit: 5/30/2017 Dear Margaret Pichardo MD 
[facility] [address] [address] VIA In Basket Sadiq Cesar MD 
[address] [place] [address] [address] [address] VIA In Basket 
 : Thank you for referring Ms. Kimber Greco to me for evaluation/treatment. Below are the relevant portions of my assessment and plan of care. If you have questions, please do not hesitate to call me. I look forward to following Ms. Chun along with you. Sincerely, Sandra Worrell MD

## 2017-05-30 NOTE — PROGRESS NOTES
Chief Complaint   Patient presents with    Thyroid Problem     pcp and pharmacy verified    Diabetes     . Last eye exam over a year   Records reviewed. History of Present Illness: Stacey Kenny is a 64 y.o. female who I was asked to see in consult by Dr. Claritza Mahoney for evaluation of DM and hypothyroidism. Was diagnosed with diabetes around 2000. Current regimen is Glipizide ER 5mg daily. Checks blood sugars every morning. Her FBG runs in the 's. Most recent Hgb A1c was 8.2% in January 2017. A typical day is as follows:  Pt is eating 3 meals per day, she notes \"I am hungry all the time\". - breakfast: She has breakfast around 9-10AM, this AM she had eggs and two slices of toast and cheese and coffee (black). - lunch: She has lunch around 2-3PM, she does not recall what she had for lunch yesterday. - dinner: She has dinner before 8PM, last night she had chicken wings, cabbage and tomatoes, V8 Juice and water. She stays very busy gardening and yard work. She follows Dr. Claritza Mahoney for issues of palpitations, her last monitoring test showed NSR, she stopped taking the Metoprolol. She states Dr. Claritza Mahoney told her she did not have an blocked heart vessels. No history of neuropathy, or nephropathy(Urine MA/Cr not elevated in January 2017). Last eye exam was about 4 years ago. Will request records from Dr. Claritza Mahoney. Pt had uterine Cancer at the age of 26 and in 200 she was diagnosed with skin cancer. These were treated with surgery. She never received chemotherapy or radiation. Pt is s/p partial thyroidectomy in 1990's for \"a tumor on my thyroid\". There was no evidence of cancer. In January 2017 her TSH was 0.163 on LT4 50mcg daily. Dr. Hardy Seip recommended she decrease her dose of LT4 to 50mcg M-F and 25mcg on Sat-Sun. A significant portion of her history was obtained from her Niece, who accompanied her today.     Past Medical History:   Diagnosis Date    Cancer ([facility] [address]     skin    Diabetes ([facility] [address]     Hypertension     Hypothyroidism 1993     Past Surgical History:   Procedure Laterality Date    HX ORTHOPAEDIC      rotator cuff repair- left    HX PARTIAL THYROIDECTOMY  1993     Current Outpatient Prescriptions   Medication Sig    levothyroxine (SYNTHROID) 50 mcg tablet TAKE 1 TABLET BY MOUTH DAILY BEFORE BREAKFAST    glipiZIDE SR (GLUCOTROL) 5 mg CR tablet Take 1 Tab by mouth daily.  glucose blood VI test strips (ACCU-CHEK ALIRIO PLUS TEST STRP) strip Check blood glucose levels twice daily. No current facility-administered medications for this visit. No Known Allergies  Family History   Problem Relation Age of Onset    No Known Problems Mother     Diabetes Father     Diabetes Sister     Cancer Brother      Prostate    Hypertension Brother     Diabetes Maternal Aunt      Social History     Social History    Marital status:      Spouse name: N/A    Number of children: N/A    Years of education: N/A     Occupational History    Not on file. Social History Main Topics    Smoking status: Never Smoker    Smokeless tobacco: Never Used    Alcohol use No    Drug use: No    Sexual activity: No     Other Topics Concern    Not on file     Social History Narrative     Review of Systems:  - Constitutional Symptoms: no fevers, chills, weight loss  - Eyes: no blurry vision or double vision  - Cardiovascular: no chest pain or palpitations  - Respiratory: no cough or shortness of breath  - Gastrointestinal: no dysphagia or abdominal pain  - Musculoskeletal: no joint pains or weakness  - Integumentary: no rashes  - Neurological: no numbness, tingling, or headaches  - Psychiatric: no depression or anxiety  - Endocrine: no heat or cold intolerance, no polyuria or polydipsia    Physical Examination:  Blood pressure 140/67, pulse (!) 106, height 5' 3\" (1.6 m), weight 129 lb (58.5 kg).   - General: pleasant, no distress, good eye contact  - HEENT: no exopthalmos, no periorbital edema, no scleral/conjunctival injection, EOMI, no lid lag or stare  - Neck: supple, no thyromegaly, masses, lymph nodes, or carotid bruits, no supraclavicular or dorsocervical fat pads  - Cardiovascular: regular, normal rate, normal S1 and S2, no murmurs/rubs/gallops, 2+ dorsalis pedis pulses bilaterally  - Respiratory: clear to auscultation bilaterally  - Gastrointestinal: soft, nontender, nondistended, no masses, no hepatosplenomegaly  - Musculoskeletal: no proximal muscle weakness in upper or lower extremities  - Integumentary: no acanthosis nigricans, no abdominal striae, no rashes, no edema, no foot ulcers  - Neurological: intact sensation to monofilament 4/4 locations, intact vibratory sensation at great toes bilaterally,   - Psychiatric: normal mood and affect    Data Reviewed:   Component      Latest Ref Rng & Units 1/13/2017 1/13/2017 1/13/2017 1/13/2017          11:25 AM 11:25 AM 11:25 AM 11:25 AM   Creatinine, urine      Not Estab. mg/dL  84.0     Microalbumin, urine      Not Estab. ug/mL  6.2     Microalbumin/Creat. Ratio      0.0 - 30.0 mg/g creat  7.4     Hemoglobin A1c, (calculated)      4.8 - 5.6 % 8.4 (H)      Estimated average glucose      mg/dL 194      T4, Free      0.82 - 1.77 ng/dL    1.54   TSH      0.450 - 4.500 uIU/mL   0.163 (L)      Component      Latest Ref Rng & Units 1/13/2017          11:25 AM   Glucose      65 - 99 mg/dL 211 (H)   BUN      8 - 27 mg/dL 19   Creatinine      0.57 - 1.00 mg/dL 0.58   GFR est non-AA      >59 mL/min/1.73 89   GFR est AA      >59 mL/min/1.73 102   BUN/Creatinine ratio      11 - 26 33 (H)   Sodium      134 - 144 mmol/L 141   Potassium      3.5 - 5.2 mmol/L 4.4   Chloride      96 - 106 mmol/L 102   CO2      18 - 29 mmol/L 24   Calcium      8.7 - 10.3 mg/dL 10.1   Protein, total      6.0 - 8.5 g/dL 6.7   Albumin      3.5 - 4.8 g/dL 4.0   GLOBULIN, TOTAL      1.5 - 4.5 g/dL 2.7   A-G Ratio      1.1 - 2.5 1.5   Bilirubin, total      0.0 - 1.2 mg/dL <0.2   Alk. phosphatase      39 - 117 IU/L 67   AST      0 - 40 IU/L 18   ALT      0 - 32 IU/L 19       Assessment/Plan:   1. Type 2 diabetes mellitus without complication, without long-term current use of insulin ([address]    2. Acquired hypothyroidism    3. Hypertension, essential, benign    1) Pt notes when she checks her BGs is always 's range and never above 120. Will have pt continue the Glipizide ER 5mg daily and check her BGs twice per day so we can get an idea of how her sugars are running before lunch, before dinner and HS. Will check an A1C and Fructosamine today. 2) Pt is clinically euthyroid, will check TFTs today and adjust her dose of LT4 as needed. 3) BP at goal today she is not taking any medication for BP at this time. Pt voices understanding and agreement with the plan. RTC 3 months    Patient Instructions   Check your blood sugars twice a day and mail your blood sugar readings to me in 3 weeks. Follow-up Disposition:  Return in about 3 months (around 8/30/2017). Copy sent to:  Ebony Liriano and Miguel

## 2017-05-30 NOTE — LETTER
5/30/2017 1:53 PM 
 
Patient:  James Loza YOB: 1940 Date of Visit: 5/30/2017 Dear Jung Betancourt MD 
[facility] [address] [address] VIA In Basket 
 : Thank you for referring Ms. Demetria Isbell to me for evaluation/treatment. Below are the relevant portions of my assessment and plan of care. If you have questions, please do not hesitate to call me. I look forward to following Ms. Chun along with you. Sincerely, Earl Coulter MD

## 2017-05-30 NOTE — MR AVS SNAPSHOT
Visit Information Date & Time Provider Department Dept. Phone Encounter #  
 5/30/2017  1:30 PM Lucinda Najjar, [address] [place] [address] Diabetes and Endocrinology [phone] Follow-up Instructions Return in about 3 months (around 8/30/2017). Your Appointments 10/10/2017 10:00 AM  
ESTABLISHED PATIENT with Janice Ramirez MD  
[facility]) Appt Note: 6 MO. F/U  
 [address] Dianna Cardenas 13  
[phone] [address] Upcoming Health Maintenance Date Due  
 FOOT EXAM Q1 10/31/1948 EYE EXAM RETINAL OR DILATED Q1 10/31/1948 DTaP/Tdap/Td series (1 - Tdap) 10/31/1959 ZOSTER VACCINE AGE 60> 10/31/1998 GLAUCOMA SCREENING Q2Y 10/31/2003 OSTEOPOROSIS SCREENING (DEXA) 10/31/2003 Pneumococcal 65+ Low/Medium Risk (1 of 2 - PCV13) 10/31/2003 MEDICARE YEARLY EXAM 1/14/2017 HEMOGLOBIN A1C Q6M 7/13/2017 INFLUENZA AGE 9 TO ADULT 8/1/2017 MICROALBUMIN Q1 1/13/2018 LIPID PANEL Q1 5/30/2018 Allergies as of 5/30/2017  Review Complete On: 5/30/2017 By: Lucinda Najjar, MD  
 No Known Allergies Current Immunizations  Never Reviewed No immunizations on file. Not reviewed this visit You Were Diagnosed With   
  
 Codes Comments Type 2 diabetes mellitus without complication, without long-term current use of insulin (HCC)    -  Primary ICD-10-CM: E11.9 ICD-9-CM: 250.00 Acquired hypothyroidism     ICD-10-CM: E03.9 ICD-9-CM: 244.9 Hypertension, essential, benign     ICD-10-CM: I10 
ICD-9-CM: 401.1 Vitals BP Pulse Height(growth percentile) Weight(growth percentile) BMI OB Status 140/67 (BP 1 Location: Right arm, BP Patient Position: Sitting) (!) 106 5' 3\" (1.6 m) 129 lb (58.5 kg) 22.85 kg/m2 Postmenopausal  
 Smoking Status Never Smoker Vitals History BMI and BSA Data Body Mass Index Body Surface Area  
 22.85 kg/m 2 1.61 m 2 Preferred Pharmacy Pharmacy Name Phone [facility] [address] [facility]  [place] [phone] Your Updated Medication List  
  
   
This list is accurate as of: 5/30/17  2:20 PM.  Always use your most recent med list.  
  
  
  
  
 glipiZIDE SR 5 mg CR tablet Commonly known as:  GLUCOTROL XL Take 1 Tab by mouth daily. glucose blood VI test strips strip Commonly known as:  ACCU-CHEK ALIRIO PLUS TEST STRP Check blood glucose levels twice daily. levothyroxine 50 mcg tablet Commonly known as:  SYNTHROID  
TAKE 1 TABLET BY MOUTH DAILY BEFORE BREAKFAST We Performed the Following FRUCTOSAMINE [19661 CPT(R)] HEMOGLOBIN A1C WITH EAG [27608 CPT(R)] TN COLLECTION VENOUS BLOOD,VENIPUNCTURE [id number] CPT(R)] TN HANDLG&/OR CONVEY OF SPEC FOR TR OFFICE TO LAB [13435 CPT(R)] T4, FREE J016377 CPT(R)] TSH 3RD GENERATION [21466 CPT(R)] Follow-up Instructions Return in about 3 months (around 8/30/2017). Patient Instructions Check your blood sugars twice a day and mail your blood sugar readings to me in 3 weeks. Introducing [facility]! Denzel Mcmahon introduces [employer] patient portal. Now you can access parts of your medical record, email your doctor's office, and request medication refills online. 1. In your internet browser, go to [email or web address] 2. Click on the First Time User? Click Here link in the Sign In box. You will see the New Member Sign Up page. 3. Enter your [employer] Access Code exactly as it appears below. You will not need to use this code after youve completed the sign-up process. If you do not sign up before the expiration date, you must request a new code. · [employer] Access Code: [email or web address] Expires: 8/28/2017  2:20 PM 
 
 4. Enter the last four digits of your Social Security Number (xxxx) and Date of Birth (mm/dd/yyyy) as indicated and click Submit. You will be taken to the next sign-up page. 5. Create a [employer] ID. This will be your [employer] login ID and cannot be changed, so think of one that is secure and easy to remember. 6. Create a [employer] password. You can change your password at any time. 7. Enter your Password Reset Question and Answer. This can be used at a later time if you forget your password. 8. Enter your e-mail address. You will receive e-mail notification when new information is available in [address] 9. Click Sign Up. You can now view and download portions of your medical record. 10. Click the Download Summary menu link to download a portable copy of your medical information. If you have questions, please visit the Frequently Asked Questions section of the [employer] website. Remember, [employer] is NOT to be used for urgent needs. For medical emergencies, dial 911. Now available from your iPhone and Android! Please provide this summary of care documentation to your next provider. Your primary care clinician is listed as Frankey Level. If you have any questions after today's visit, please call [phone].

## 2017-05-31 LAB
EST. AVERAGE GLUCOSE BLD GHB EST-MCNC: 192 MG/DL
FRUCTOSAMINE SERPL-SCNC: 350 UMOL/L (ref 0–285)
HBA1C MFR BLD: 8.3 % (ref 4.8–5.6)
T4 FREE SERPL-MCNC: 1.38 NG/DL (ref 0.82–1.77)
TSH SERPL DL<=0.005 MIU/L-ACNC: 0.55 UIU/ML (ref 0.45–4.5)

## 2017-05-31 NOTE — PROGRESS NOTES
Her A1C came back at 8.3%, which reflects an average blood sugar of 190 for the last 3 months. This makes me think that she is having high blood sugars at some point in the day, because her sugars first thing in the morning are so go. Check your sugars before lunch, before dinner and at bedtime as we discussed, so we can get an idea of how your sugars are doing during the day. Your Thyroid labs look very good, this tells me that the Levothyroxine 50mcg (one whole pill) Mon-Fri and 25mcg (1/2 pill) on Sat and Sunday is the correct dose for you.

## 2017-06-01 NOTE — PROGRESS NOTES
Advised Niece German Mcconnell, on HIPPA of lab results. Patient had requested that her niece be notified. Mailed a lab letter to patient for her reference. Niece expressed understanding.

## 2017-06-16 DIAGNOSIS — E11.9 TYPE 2 DIABETES MELLITUS WITHOUT COMPLICATION (HCC): ICD-10-CM

## 2017-06-19 RX ORDER — BLOOD SUGAR DIAGNOSTIC
STRIP MISCELLANEOUS
Qty: 100 STRIP | Refills: 0 | Status: SHIPPED | OUTPATIENT
Start: 2017-06-19 | End: 2019-08-12 | Stop reason: SDUPTHER

## 2017-08-18 ENCOUNTER — TELEPHONE (OUTPATIENT)
Dept: ENDOCRINOLOGY | Age: 77
End: 2017-08-18

## 2017-08-18 NOTE — TELEPHONE ENCOUNTER
Reviewed BG logs (see scanned documents)  Her sugars have been running in the 's range with no episodes of hypoglycemia. Pt encouraged to keep up the good work. Pt voices understanding and agreement with the plan.

## 2017-08-24 ENCOUNTER — TELEPHONE (OUTPATIENT)
Dept: INTERNAL MEDICINE CLINIC | Age: 77
End: 2017-08-24

## 2017-08-24 NOTE — TELEPHONE ENCOUNTER
#[phone] pt states when she gets up in the morning her head is spinning and sometimes it feels like someone is flashing a flash light in her eyes. Her head does hurt at times. She felt like this when b/p was low. Pt states she was taken off a medication previously as her b/p was low. The last time she was seen she states it was better. Please call pt to advise as she wants to see Dr. Baltazar Bull as soon as possible as she doesn't feel well.

## 2017-08-24 NOTE — TELEPHONE ENCOUNTER
Identified patient 2 identifiers verified. Spoke with patient she  Was given an appointment with Dr. Apple Chun in September. Patient was told to go to ED for evaluation Patient reports [facility] include [facility] beds at the [facility] would be closer to her. Left a message for her niece Kelly Zaidi verified and reported the above. Patient denies chest pain Sob. Oriented to name time , place.

## 2017-09-11 ENCOUNTER — OFFICE VISIT (OUTPATIENT)
Dept: ENDOCRINOLOGY | Age: 77
End: 2017-09-11

## 2017-09-11 VITALS
BODY MASS INDEX: 22.32 KG/M2 | HEART RATE: 89 BPM | HEIGHT: 63 IN | SYSTOLIC BLOOD PRESSURE: 134 MMHG | DIASTOLIC BLOOD PRESSURE: 62 MMHG | WEIGHT: 126 LBS

## 2017-09-11 DIAGNOSIS — I10 HYPERTENSION, ESSENTIAL, BENIGN: ICD-10-CM

## 2017-09-11 DIAGNOSIS — E11.9 TYPE 2 DIABETES MELLITUS WITHOUT COMPLICATION, WITHOUT LONG-TERM CURRENT USE OF INSULIN (HCC): Primary | ICD-10-CM

## 2017-09-11 DIAGNOSIS — E03.9 ACQUIRED HYPOTHYROIDISM: ICD-10-CM

## 2017-09-11 DIAGNOSIS — G43.119 INTRACTABLE MIGRAINE WITH AURA WITHOUT STATUS MIGRAINOSUS: ICD-10-CM

## 2017-09-11 LAB — HBA1C MFR BLD HPLC: 6.8 %

## 2017-09-11 NOTE — PATIENT INSTRUCTIONS
1) If you have the flashing light and dizziness in the morning take one Aleve with a full glass of water and lay down in a dark room for 30 minutes to see if the dizziness gets better. I suspect you are having Migraine Headaches and I recommend you call Dr. Funmi Rizvi to have this evaluated further. Recurring Migraine Headache: Care Instructions  Your Care Instructions  Migraines are painful, throbbing headaches. They often start on one side of the head. They may cause nausea and vomiting and make you sensitive to light, sound, or smell. Some people may have only a few migraines throughout life. Others have them as often as several times a month. The goal of treatment is to reduce the number of migraines you have and relieve your symptoms. Even with treatment, you may continue to have migraines. You play an important role in dealing with your headaches. Work on avoiding things that seem to trigger your migraines. When you feel a headache coming on, act quickly to stop it before it gets worse. Follow-up care is a key part of your treatment and safety. Be sure to make and go to all appointments, and call your doctor if you are having problems. It's also a good idea to know your test results and keep a list of the medicines you take. How can you care for yourself at home? · Do not drive if you have taken a prescription pain medicine. · Rest in a quiet, dark room until your headache is gone. Close your eyes and try to relax or go to sleep. Do not watch TV or read. · Put a cold, moist cloth or cold pack on the painful area for 10 to 20 minutes at a time. Put a thin cloth between the cold pack and your skin. · Have someone gently massage your neck and shoulders. · Take your medicines exactly as prescribed. Call your doctor if you think you are having a problem with your medicine. You will get more details on the specific medicines your doctor prescribes.   To prevent migraines  · Keep a headache diary so you can figure out what triggers your headaches. Avoiding triggers may help you prevent headaches. Record when each headache began, how long it lasted, and what the pain was like. Use words like throbbing, aching, stabbing, or dull. Write down any other symptoms you had with the headache. These may include nausea, flashing lights or dark spots, or sensitivity to bright light or loud noise. Note if the headache occurred near your period. List anything that might have triggered the headache. Triggers may include certain foods (chocolate, cheese, wine) or odors, smoke, bright light, stress, or lack of sleep. · If your doctor has prescribed medicine for your migraines, take it as directed. You may have medicine that you take only when you get a migraine and medicine that you take all the time to help prevent migraines. ¨ If your doctor has prescribed medicine for when you get a headache, take it at the first sign of a migraine, unless your doctor has given you other instructions. ¨ If your doctor has prescribed medicine to prevent migraines, take it exactly as prescribed. Call your doctor if you think you are having a problem with your medicine. · Find healthy ways to deal with stress. Migraines are most common during or right after stressful times. Take time to relax before and after you do something that has caused a migraine in the past.  · Try to keep your muscles relaxed by keeping good posture. Check your jaw, face, neck, and shoulder muscles for tension. Try to relax them. When sitting at a desk, change positions often. Stretch for 30 seconds each hour. · Get regular sleep and exercise. · Eat regular meals, and avoid foods and drinks that often trigger migraines. These include chocolate and alcohol, especially red wine and port. Chemicals used in food, such as aspartame and monosodium glutamate (MSG), also can trigger migraines.  So can some food additives, such as those found in hot dogs, redmond, cold cuts, aged cheeses, and pickled foods. · Limit caffeine by not drinking too much coffee, tea, or soda. Do not quit caffeine suddenly, because that can also give you migraines. · Do not smoke or allow others to smoke around you. If you need help quitting, talk to your doctor about stop-smoking programs and medicines. These can increase your chances of quitting for good. · If you are taking birth control pills or hormone therapy, talk to your doctor about whether they are triggering your migraines. When should you call for help? Call 911 anytime you think you may need emergency care. For example, call if:  · You have symptoms of a stroke. These may include:  ¨ Sudden numbness, tingling, weakness, or loss of movement in your face, arm, or leg, especially on only one side of your body. ¨ Sudden vision changes. ¨ Sudden trouble speaking. ¨ Sudden confusion or trouble understanding simple statements. ¨ Sudden problems with walking or balance. ¨ A sudden, severe headache that is different from past headaches. Call your doctor now or seek immediate medical care if:  · You develop a fever and a stiff neck. · You have new nausea and vomiting, or you cannot keep down food or liquids. Watch closely for changes in your health, and be sure to contact your doctor if:  · You have a headache that does not get better within 1 or 2 days. · Your headaches get worse or happen more often. Where can you learn more? Go to http://alejandrina-vee.info/. Enter V975 in the search box to learn more about \"Recurring Migraine Headache: Care Instructions. \"  Current as of: October 14, 2016  Content Version: 11.3  © 2996-2065 [employer]. Care instructions adapted under license by [employer] (which disclaims liability or warranty for this information).  If you have questions about a medical condition or this instruction, always ask your healthcare professional. [place] [address] any warranty or liability for your use of this information.

## 2017-09-11 NOTE — MR AVS SNAPSHOT
Visit Information Date & Time Provider Department Dept. Phone Encounter #  
 9/11/2017  8:50 AM Gabriel Hoang MD [facility] [phone] [id number] Your Appointments 9/13/2017  3:30 PM  
ROUTINE CARE with Darrius Churchill MD  
[facility] [address]) Appt Note: blood Pressure and dizziness [facility] [address]  
  
   
 [facility] [address] [facility]  [address] [facility]  
  
    
 10/10/2017 10:00 AM  
ESTABLISHED PATIENT with Claudeen Platts, MD  
[facility]) Appt Note: 6 MO. F/U  
 [address]  
[phone] [address] Upcoming Health Maintenance Date Due  
 FOOT EXAM Q1 10/31/1950 EYE EXAM RETINAL OR DILATED Q1 10/31/1950 DTaP/Tdap/Td series (1 - Tdap) 10/31/1959 ZOSTER VACCINE AGE 60> 8/31/1998 GLAUCOMA SCREENING Q2Y 10/31/2003 OSTEOPOROSIS SCREENING (DEXA) 10/31/2003 Pneumococcal 65+ Low/Medium Risk (1 of 2 - PCV13) 10/31/2003 MEDICARE YEARLY EXAM 1/14/2017 INFLUENZA AGE 9 TO ADULT 8/1/2017 HEMOGLOBIN A1C Q6M 11/30/2017 MICROALBUMIN Q1 1/13/2018 LIPID PANEL Q1 5/30/2018 Allergies as of 9/11/2017  Review Complete On: 9/11/2017 By: Gabriel Hoang MD  
 No Known Allergies Current Immunizations  Never Reviewed No immunizations on file. Not reviewed this visit You Were Diagnosed With   
  
 Codes Comments Type 2 diabetes mellitus without complication, without long-term current use of insulin (HCC)    -  Primary ICD-10-CM: E11.9 ICD-9-CM: 250.00 Acquired hypothyroidism     ICD-10-CM: E03.9 ICD-9-CM: 244.9 Hypertension, essential, benign     ICD-10-CM: I10 
ICD-9-CM: 401.1 Mixed hyperlipidemia     ICD-10-CM: E78.2 ICD-9-CM: 272.2 Vitals BP Pulse Height(growth percentile) Weight(growth percentile) BMI OB Status 134/62 89 5' 3\" (1.6 m) 126 lb (57.2 kg) 22.32 kg/m2 Postmenopausal  
 Smoking Status Never Smoker Vitals History BMI and BSA Data Body Mass Index Body Surface Area  
 22.32 kg/m 2 1.59 m 2 Preferred Pharmacy Pharmacy Name Phone [facility]  [place] [facility] [phone] Your Updated Medication List  
  
   
This list is accurate as of: 9/11/17 10:54 AM.  Always use your most recent med list.  
  
  
  
  
 ACCU-CHEK ALIRIO PLUS TEST STRP strip Generic drug:  glucose blood VI test strips USE TO CHECK BLOOD SUGAR TWICE DAILY  
  
 glipiZIDE SR 5 mg CR tablet Commonly known as:  GLUCOTROL XL  
TAKE 1 TABLET BY MOUTH DAILY  
  
 levothyroxine 50 mcg tablet Commonly known as:  SYNTHROID  
TAKE 1 TABLET BY MOUTH DAILY BEFORE BREAKFAST We Performed the Following AMB POC HEMOGLOBIN A1C [65689 CPT(R)]  DIABETES FOOT EXAM [7 Custom] MO COLLECTION VENOUS BLOOD,VENIPUNCTURE [id number] CPT(R)] SPECIMEN HANDLING, OFF->LAB [id number] CPT(R)] T4, FREE [id number] CPT(R)] TSH 3RD GENERATION [27510 CPT(R)] Patient Instructions 1) If you have the flashing light and dizziness in the morning take one Aleve with a full glass of water and lay down in a dark room for 30 minutes to see if the dizziness gets better. I suspect you are having Migraine Headaches and I recommend you call Dr. Alonso Isidro to have this evaluated further. Recurring Migraine Headache: Care Instructions Your Care Instructions Migraines are painful, throbbing headaches. They often start on one side of the head. They may cause nausea and vomiting and make you sensitive to light, sound, or smell. Some people may have only a few migraines throughout life. Others have them as often as several times a month. The goal of treatment is to reduce the number of migraines you have and relieve your symptoms. Even with treatment, you may continue to have migraines. You play an important role in dealing with your headaches. Work on avoiding things that seem to trigger your migraines. When you feel a headache coming on, act quickly to stop it before it gets worse. Follow-up care is a key part of your treatment and safety. Be sure to make and go to all appointments, and call your doctor if you are having problems. It's also a good idea to know your test results and keep a list of the medicines you take. How can you care for yourself at home? · Do not drive if you have taken a prescription pain medicine. · Rest in a quiet, dark room until your headache is gone. Close your eyes and try to relax or go to sleep. Do not watch TV or read. · Put a cold, moist cloth or cold pack on the painful area for 10 to 20 minutes at a time. Put a thin cloth between the cold pack and your skin. · Have someone gently massage your neck and shoulders. · Take your medicines exactly as prescribed. Call your doctor if you think you are having a problem with your medicine. You will get more details on the specific medicines your doctor prescribes. To prevent migraines · Keep a headache diary so you can figure out what triggers your headaches. Avoiding triggers may help you prevent headaches. Record when each headache began, how long it lasted, and what the pain was like. Use words like throbbing, aching, stabbing, or dull. Write down any other symptoms you had with the headache. These may include nausea, flashing lights or dark spots, or sensitivity to bright light or loud noise. Note if the headache occurred near your period. List anything that might have triggered the headache. Triggers may include certain foods (chocolate, cheese, wine) or odors, smoke, bright light, stress, or lack of sleep. · If your doctor has prescribed medicine for your migraines, take it as directed. You may have medicine that you take only when you get a migraine and medicine that you take all the time to help prevent migraines. ¨ If your doctor has prescribed medicine for when you get a headache, take it at the first sign of a migraine, unless your doctor has given you other instructions. ¨ If your doctor has prescribed medicine to prevent migraines, take it exactly as prescribed. Call your doctor if you think you are having a problem with your medicine. · Find healthy ways to deal with stress. Migraines are most common during or right after stressful times. Take time to relax before and after you do something that has caused a migraine in the past. 
· Try to keep your muscles relaxed by keeping good posture. Check your jaw, face, neck, and shoulder muscles for tension. Try to relax them. When sitting at a desk, change positions often. Stretch for 30 seconds each hour. · Get regular sleep and exercise. · Eat regular meals, and avoid foods and drinks that often trigger migraines. These include chocolate and alcohol, especially red wine and port. Chemicals used in food, such as aspartame and monosodium glutamate (MSG), also can trigger migraines. So can some food additives, such as those found in hot dogs, redmond, cold cuts, aged cheeses, and pickled foods. · Limit caffeine by not drinking too much coffee, tea, or soda. Do not quit caffeine suddenly, because that can also give you migraines. · Do not smoke or allow others to smoke around you. If you need help quitting, talk to your doctor about stop-smoking programs and medicines. These can increase your chances of quitting for good. · If you are taking birth control pills or hormone therapy, talk to your doctor about whether they are triggering your migraines. When should you call for help? Call 911 anytime you think you may need emergency care. For example, call if: · You have symptoms of a stroke. These may include: 
¨ Sudden numbness, tingling, weakness, or loss of movement in your face, arm, or leg, especially on only one side of your body. ¨ Sudden vision changes. ¨ Sudden trouble speaking. ¨ Sudden confusion or trouble understanding simple statements. ¨ Sudden problems with walking or balance. ¨ A sudden, severe headache that is different from past headaches. Call your doctor now or seek immediate medical care if: 
· You develop a fever and a stiff neck. · You have new nausea and vomiting, or you cannot keep down food or liquids. Watch closely for changes in your health, and be sure to contact your doctor if: 
· You have a headache that does not get better within 1 or 2 days. · Your headaches get worse or happen more often. Where can you learn more? Go to http://alejandrina-vee.info/. Enter V975 in the search box to learn more about \"Recurring Migraine Headache: Care Instructions. \" Current as of: October 14, 2016 Content Version: 11.3 © 9154-3357 [employer]. Care instructions adapted under license by [employer] (which disclaims liability or warranty for this information). If you have questions about a medical condition or this instruction, always ask your healthcare professional. [place] [address] any warranty or liability for your use of this information. Introducing [facility]! Guillermo Gallardo introduces Gov[employer] patient portal. Now you can access parts of your medical record, email your doctor's office, and request medication refills online. 1. In your internet browser, go to [email or web address] 2. Click on the First Time User? Click Here link in the Sign In box. You will see the New Member Sign Up page. 3. Enter your [employer] Access Code exactly as it appears below. You will not need to use this code after youve completed the sign-up process.  If you do not sign up before the expiration date, you must request a new code. · [employer] Access Code: [email or web address] Expires: 12/10/2017 10:54 AM 
 
4. Enter the last four digits of your Social Security Number (xxxx) and Date of Birth (mm/dd/yyyy) as indicated and click Submit. You will be taken to the next sign-up page. 5. Create a [employer] ID. This will be your [employer] login ID and cannot be changed, so think of one that is secure and easy to remember. 6. Create a [employer] password. You can change your password at any time. 7. Enter your Password Reset Question and Answer. This can be used at a later time if you forget your password. 8. Enter your e-mail address. You will receive e-mail notification when new information is available in [address] 9. Click Sign Up. You can now view and download portions of your medical record. 10. Click the Download Summary menu link to download a portable copy of your medical information. If you have questions, please visit the Frequently Asked Questions section of the [employer] website. Remember, [employer] is NOT to be used for urgent needs. For medical emergencies, dial 911. Now available from your iPhone and Android! Please provide this summary of care documentation to your next provider. Your primary care clinician is listed as Zoey Díaz. If you have any questions after today's visit, please call [phone].

## 2017-09-11 NOTE — PROGRESS NOTES
Chief Complaint   Patient presents with    Diabetes     pcp and pharmavy verified. Last eye exam over a year    Thyroid Problem   Records since last visit reviewed  History of Present Illness: Omar Long is a 64 y.o. female here for follow up of diabetes and hypothyroidism. She was diagnosed with diabetes around 2000. At our initial visit in May 2017 her regimen consisted of Glipizide ER 5mg daily. Her A1C today was down to 6.8%. Pt notes she has been having issues of dizziness, which she notes has been going on for about 3 months. She notes that when she wakes up she has \"flashing and dizziness\". This AM she has a HA and she notes she will get dizziness and \"flashes\" before she has the onset of the HA. She notes it can \"hapen any day all day\". She notes she is having the HA and dizziness everyday. She will check her BGs when she gets dizzy and it will be in the 's range. She is not having any issues of hypoglycemia. She is able to sense hypoglycemic episodes. Yesterday she tried aleve for her HA yesterday and it did not help. She has not mentioned this issue to Dr. Jesika Oconnor.    She is still taking the Glipizide ER 5mg daily. She eats 3 meals per day. She has breakfast around 9-10AM, this AM she had two slices of toast with cheese, a piece of sausage and OJ. She has lunch around 1-2PM, yesterday she had butter beans, mac and cheese and chicken wings and unsweetened tea. She has dinner around 7PM, last night she had meatloaf, butter beans and mac and cheese and tomatoes. She stays very busy gardening and yard work. She follows Dr. Kasey Geller for issues of palpitations, her last monitoring test showed NSR, she stopped taking the Metoprolol. She states Dr. Kasey Geller told her she did not have an blocked heart vessels. She sees Dr. Kasey Geller next month. No history of neuropathy, or nephropathy(Urine MA/Cr not elevated in January 2017). Last eye exam was about 4 years ago.     Pt had uterine Cancer at the age of 26 and in [facility] she was diagnosed with skin cancer. These were treated with surgery. She never received chemotherapy or radiation. Pt is s/p partial thyroidectomy in 1990's for \"a tumor on my thyroid\". There was no evidence of cancer. In May 2017 she was clinically and biochemically euthyroid on LT4 to 50mcg M-F and 25mcg on Sat-Sun. A significant portion of her history was obtained from her Niece, who accompanied her today. Current Outpatient Prescriptions   Medication Sig    glipiZIDE SR (GLUCOTROL XL) 5 mg CR tablet TAKE 1 TABLET BY MOUTH DAILY    ACCU-CHEK ALIRIO PLUS TEST STRP strip USE TO CHECK BLOOD SUGAR TWICE DAILY    levothyroxine (SYNTHROID) 50 mcg tablet TAKE 1 TABLET BY MOUTH DAILY BEFORE BREAKFAST     No current facility-administered medications for this visit. No Known Allergies  Review of Systems:  - Eyes: no blurry vision or double vision  - Cardiovascular: no chest pain  - Respiratory: no shortness of breath  - Musculoskeletal: no myalgias  - Neurological: + HAs, and \"flashing light with dizziness\"    Physical Examination:  Blood pressure 134/62, pulse 89, height 5' 3\" (1.6 m), weight 126 lb (57.2 kg). - General: pleasant, no distress, good eye contact   - Neck: no carotid bruits  - Cardiovascular: regular, normal rate, nl s1 and s2, no m/r/g, 2+ DP pulses   - Respiratory: clear bilaterally  - Integumentary: no edema, no foot ulcers, sensation to monofilament and vibration intact bilaterally        -     Neuro: A&O x4, CN 2-12 intact bilterally  - Psychiatric: normal mood and affect    Data Reviewed:   Her A1C today was 6.8%. Assessment/Plan:   1) DM > Her A1C has improved and her BGs are doing well. Pt to continue the Glipizide ER 5mg daily. Pt to check her BGs daily. 2) Hypothyroidism > Will check TFTs to ensure her levels are correct and not contributing to her symptoms.     3) HTN > BP at goal today she is not taking any medication for BP at this time. 4) Migraine > I suspect the symptoms of dizziness, lights and HA are a migraine with Aura. Pt to try a single Aleve at the onset of the symptoms with a full glass of water and lay down in a dark room for 30 minutes. No SSx of CVA or any neuro deficits noted on physical exam.  Pt to call Dr. Festus Arora for further evaluation, she may need to see Neurology for further evaluation. Pt voices understanding and agreement with the plan. Pain noted and addressed as noted above, pt was recommended to call her PCP for further evaluation and treatment, as needed. We spent 40 minutes of face to face time together and > 50% of the time was spent in counseling on the above issues. RTC 3 months  Patient Instructions   1) If you have the flashing light and dizziness in the morning take one Aleve with a full glass of water and lay down in a dark room for 30 minutes to see if the dizziness gets better. I suspect you are having Migraine Headaches and I recommend you call Dr. Festus Arora to have this evaluated further. Recurring Migraine Headache: Care Instructions  Your Care Instructions  Migraines are painful, throbbing headaches. They often start on one side of the head. They may cause nausea and vomiting and make you sensitive to light, sound, or smell. Some people may have only a few migraines throughout life. Others have them as often as several times a month. The goal of treatment is to reduce the number of migraines you have and relieve your symptoms. Even with treatment, you may continue to have migraines. You play an important role in dealing with your headaches. Work on avoiding things that seem to trigger your migraines. When you feel a headache coming on, act quickly to stop it before it gets worse. Follow-up care is a key part of your treatment and safety. Be sure to make and go to all appointments, and call your doctor if you are having problems.  It's also a good idea to know your test results and keep a list of the medicines you take. How can you care for yourself at home? · Do not drive if you have taken a prescription pain medicine. · Rest in a quiet, dark room until your headache is gone. Close your eyes and try to relax or go to sleep. Do not watch TV or read. · Put a cold, moist cloth or cold pack on the painful area for 10 to 20 minutes at a time. Put a thin cloth between the cold pack and your skin. · Have someone gently massage your neck and shoulders. · Take your medicines exactly as prescribed. Call your doctor if you think you are having a problem with your medicine. You will get more details on the specific medicines your doctor prescribes. To prevent migraines  · Keep a headache diary so you can figure out what triggers your headaches. Avoiding triggers may help you prevent headaches. Record when each headache began, how long it lasted, and what the pain was like. Use words like throbbing, aching, stabbing, or dull. Write down any other symptoms you had with the headache. These may include nausea, flashing lights or dark spots, or sensitivity to bright light or loud noise. Note if the headache occurred near your period. List anything that might have triggered the headache. Triggers may include certain foods (chocolate, cheese, wine) or odors, smoke, bright light, stress, or lack of sleep. · If your doctor has prescribed medicine for your migraines, take it as directed. You may have medicine that you take only when you get a migraine and medicine that you take all the time to help prevent migraines. ¨ If your doctor has prescribed medicine for when you get a headache, take it at the first sign of a migraine, unless your doctor has given you other instructions. ¨ If your doctor has prescribed medicine to prevent migraines, take it exactly as prescribed. Call your doctor if you think you are having a problem with your medicine. · Find healthy ways to deal with stress. Migraines are most common during or right after stressful times. Take time to relax before and after you do something that has caused a migraine in the past.  · Try to keep your muscles relaxed by keeping good posture. Check your jaw, face, neck, and shoulder muscles for tension. Try to relax them. When sitting at a desk, change positions often. Stretch for 30 seconds each hour. · Get regular sleep and exercise. · Eat regular meals, and avoid foods and drinks that often trigger migraines. These include chocolate and alcohol, especially red wine and port. Chemicals used in food, such as aspartame and monosodium glutamate (MSG), also can trigger migraines. So can some food additives, such as those found in hot dogs, redmond, cold cuts, aged cheeses, and pickled foods. · Limit caffeine by not drinking too much coffee, tea, or soda. Do not quit caffeine suddenly, because that can also give you migraines. · Do not smoke or allow others to smoke around you. If you need help quitting, talk to your doctor about stop-smoking programs and medicines. These can increase your chances of quitting for good. · If you are taking birth control pills or hormone therapy, talk to your doctor about whether they are triggering your migraines. When should you call for help? Call 911 anytime you think you may need emergency care. For example, call if:  · You have symptoms of a stroke. These may include:  ¨ Sudden numbness, tingling, weakness, or loss of movement in your face, arm, or leg, especially on only one side of your body. ¨ Sudden vision changes. ¨ Sudden trouble speaking. ¨ Sudden confusion or trouble understanding simple statements. ¨ Sudden problems with walking or balance. ¨ A sudden, severe headache that is different from past headaches. Call your doctor now or seek immediate medical care if:  · You develop a fever and a stiff neck. · You have new nausea and vomiting, or you cannot keep down food or liquids.   Watch closely for changes in your health, and be sure to contact your doctor if:  · You have a headache that does not get better within 1 or 2 days. · Your headaches get worse or happen more often. Where can you learn more? Go to http://alejandrina-vee.info/. Enter V975 in the search box to learn more about \"Recurring Migraine Headache: Care Instructions. \"  Current as of: October 14, 2016  Content Version: 11.3  © 3627-8366 [employer]. Care instructions adapted under license by [employer] (which disclaims liability or warranty for this information). If you have questions about a medical condition or this instruction, always ask your healthcare professional. [place] [address] any warranty or liability for your use of this information. Follow-up Disposition:  Return in about 3 months (around 12/11/2017).     Copy sent to:  Dr. Fabiola Cedillo

## 2017-09-12 DIAGNOSIS — E03.9 ACQUIRED HYPOTHYROIDISM: Primary | ICD-10-CM

## 2017-09-12 LAB
T4 FREE SERPL-MCNC: 1.42 NG/DL (ref 0.82–1.77)
TSH SERPL DL<=0.005 MIU/L-ACNC: 0.22 UIU/ML (ref 0.45–4.5)

## 2017-09-12 NOTE — PROGRESS NOTES
Pt's thyroid levels show her dose of the Levothyroxine 50mcg every day is too high. I want her to decrease her Levothyroxine to one pill Mon-Fri only and nothing on Sat or Sun (5 pills per week). We can recheck her Thyroid levels in 6 weeks to ensure this change in the dose is working better for her.

## 2017-10-17 ENCOUNTER — TELEPHONE (OUTPATIENT)
Dept: INTERNAL MEDICINE CLINIC | Age: 77
End: 2017-10-17

## 2017-10-17 NOTE — TELEPHONE ENCOUNTER
----- Message from [facility] sent at 10/17/2017 11:02 AM EDT -----  Regarding: Dr. Werner Greco stated she has an appt on tomorrow at 9:00 a.m. to see Dr. Zulma Dinero at [place] [phone]) and need the referral sent. Best contact number [phone].       Message copied/pasted from [facility]

## 2017-10-17 NOTE — TELEPHONE ENCOUNTER
Patients referral has been obtained from [facility] and faxed over to Dr. Maurer Grounds office. Please see Yamilka Ferrer for details.

## 2017-11-18 LAB
T4 FREE SERPL-MCNC: 1.32 NG/DL (ref 0.82–1.77)
TSH SERPL DL<=0.005 MIU/L-ACNC: 0.57 UIU/ML (ref 0.45–4.5)

## 2017-11-20 NOTE — PROGRESS NOTES
Her thyroid levels are looking very good. Continue the Levothyroxine One pill Monday-Friday only. She does not have a follow up appointment. Can she come in to see me sometime in March 2018 for follow up.

## 2017-11-24 ENCOUNTER — TELEPHONE (OUTPATIENT)
Dept: ENDOCRINOLOGY | Age: 77
End: 2017-11-24

## 2017-11-24 NOTE — TELEPHONE ENCOUNTER
----- Message from Trey Jacobo sent at 11/24/2017 12:18 PM EST -----  Regarding: Dr. Shima Kearns  Pt was returning a call about test results and can be reached back at [phone].

## 2017-11-24 NOTE — PROGRESS NOTES
Patient has been advised of lab results and expressed understanding. Call transferred to front for scheduling in March.

## 2017-12-29 RX ORDER — LEVOTHYROXINE SODIUM 50 UG/1
TABLET ORAL
Qty: 90 TAB | Refills: 0 | Status: SHIPPED | OUTPATIENT
Start: 2017-12-29 | End: 2018-02-14 | Stop reason: SDUPTHER

## 2018-02-14 ENCOUNTER — OFFICE VISIT (OUTPATIENT)
Dept: INTERNAL MEDICINE CLINIC | Age: 78
End: 2018-02-14

## 2018-02-14 VITALS
BODY MASS INDEX: 23.92 KG/M2 | HEART RATE: 100 BPM | OXYGEN SATURATION: 99 % | TEMPERATURE: 98.3 F | DIASTOLIC BLOOD PRESSURE: 71 MMHG | RESPIRATION RATE: 18 BRPM | WEIGHT: 135 LBS | HEIGHT: 63 IN | SYSTOLIC BLOOD PRESSURE: 138 MMHG

## 2018-02-14 DIAGNOSIS — Z13.39 SCREENING FOR ALCOHOLISM: ICD-10-CM

## 2018-02-14 DIAGNOSIS — Z13.31 SCREENING FOR DEPRESSION: ICD-10-CM

## 2018-02-14 DIAGNOSIS — M94.9 DISORDER OF BONE AND CARTILAGE: ICD-10-CM

## 2018-02-14 DIAGNOSIS — I10 HYPERTENSION, ESSENTIAL, BENIGN: ICD-10-CM

## 2018-02-14 DIAGNOSIS — M89.9 DISORDER OF BONE AND CARTILAGE: ICD-10-CM

## 2018-02-14 DIAGNOSIS — E03.9 ACQUIRED HYPOTHYROIDISM: ICD-10-CM

## 2018-02-14 DIAGNOSIS — E11.9 TYPE 2 DIABETES MELLITUS WITHOUT COMPLICATION, WITHOUT LONG-TERM CURRENT USE OF INSULIN (HCC): ICD-10-CM

## 2018-02-14 DIAGNOSIS — G43.109 MIGRAINE WITH AURA AND WITHOUT STATUS MIGRAINOSUS, NOT INTRACTABLE: ICD-10-CM

## 2018-02-14 DIAGNOSIS — Z78.0 POST-MENOPAUSAL: ICD-10-CM

## 2018-02-14 DIAGNOSIS — Z00.00 MEDICARE ANNUAL WELLNESS VISIT, SUBSEQUENT: Primary | ICD-10-CM

## 2018-02-14 DIAGNOSIS — Z11.59 NEED FOR HEPATITIS C SCREENING TEST: ICD-10-CM

## 2018-02-14 RX ORDER — GLIPIZIDE 5 MG/1
TABLET, FILM COATED, EXTENDED RELEASE ORAL
Qty: 90 TAB | Refills: 0 | Status: SHIPPED | OUTPATIENT
Start: 2018-02-14 | End: 2018-08-29 | Stop reason: SDUPTHER

## 2018-02-14 RX ORDER — LEVOTHYROXINE SODIUM 50 UG/1
TABLET ORAL
Qty: 90 TAB | Refills: 0 | Status: SHIPPED | OUTPATIENT
Start: 2018-02-14 | End: 2018-08-31 | Stop reason: SDUPTHER

## 2018-02-14 NOTE — MR AVS SNAPSHOT
[address] [place] [phone] Patient: Ambrose Santos MRN: [id number] :1940 Visit Information Date & Time Provider Department Dept. Phone Encounter #  
 2018 11:00 AM Nery Tyler, [address] [facility] [phone] [id number] Follow-up Instructions Return in about 6 months (around 2018) for follow up htn. Your Appointments 3/5/2018 10:50 AM  
Follow Up with Emani Mccrary MD  
[facility] [address]) Appt Note: f/u [facility] [phone] [address] [place] [address] Upcoming Health Maintenance Date Due  
 EYE EXAM RETINAL OR DILATED Q1 10/31/1950 GLAUCOMA SCREENING Q2Y 10/31/2003 OSTEOPOROSIS SCREENING (DEXA) 10/31/2003 MEDICARE YEARLY EXAM 2017 MICROALBUMIN Q1 2018 HEMOGLOBIN A1C Q6M 3/11/2018 LIPID PANEL Q1 2018 FOOT EXAM Q1 2018 Pneumococcal 65+ Low/Medium Risk (2 of 2 - PPSV23) 2019 DTaP/Tdap/Td series (2 - Td) 2028 Allergies as of 2018  Review Complete On: 2018 By: Li Soria LPN No Known Allergies Current Immunizations  Never Reviewed No immunizations on file. Not reviewed this visit You Were Diagnosed With   
  
 Codes Comments Medicare annual wellness visit, subsequent    -  Primary ICD-10-CM: Z00.00 ICD-9-CM: V70.0 Type 2 diabetes mellitus without complication, without long-term current use of insulin (HCC)     ICD-10-CM: E11.9 ICD-9-CM: 250.00 Hypertension, essential, benign     ICD-10-CM: I10 
ICD-9-CM: 401.1 Acquired hypothyroidism     ICD-10-CM: E03.9 ICD-9-CM: 244.9 Migraine with aura and without status migrainosus, not intractable     ICD-10-CM: G43.109 ICD-9-CM: 346.00   
 Post-menopausal     ICD-10-CM: Z78.0 ICD-9-CM: V49.81 Screening for alcoholism     ICD-10-CM: Z13.89 ICD-9-CM: V79.1 Screening for depression     ICD-10-CM: Z13.89 ICD-9-CM: V79.0 Need for hepatitis C screening test     ICD-10-CM: Z11.59 
ICD-9-CM: V73.89 Disorder of bone and cartilage     ICD-10-CM: M89.9, M94.9 ICD-9-CM: 733.90 Vitals BP Pulse Temp Resp Height(growth percentile) Weight(growth percentile) 138/71 (BP 1 Location: Left arm, BP Patient Position: Sitting) 100 98.3 °F (36.8 °C) (Oral) 18 5' 3\" (1.6 m) 135 lb (61.2 kg) SpO2 BMI OB Status Smoking Status 99% 23.91 kg/m2 Postmenopausal Never Smoker Vitals History BMI and BSA Data Body Mass Index Body Surface Area  
 23.91 kg/m 2 1.65 m 2 Preferred Pharmacy Pharmacy Name Phone [facility] [employer] [facility] AT [address] [place] [address] [phone] Your Updated Medication List  
  
   
This list is accurate as of: 2/14/18 11:50 AM.  Always use your most recent med list.  
  
  
  
  
 ACCU-CHEK ALIRIO PLUS TEST STRP strip Generic drug:  glucose blood VI test strips USE TO CHECK BLOOD SUGAR TWICE DAILY  
  
 glipiZIDE SR 5 mg CR tablet Commonly known as:  GLUCOTROL XL  
TAKE 1 TABLET BY MOUTH DAILY  
  
 levothyroxine 50 mcg tablet Commonly known as:  SYNTHROID  
TAKE 1 TABLET BY MOUTH DAILY BEFORE BREAKFAST Prescriptions Sent to Pharmacy Refills  
 levothyroxine (SYNTHROID) 50 mcg tablet 0 Sig: TAKE 1 TABLET BY MOUTH DAILY BEFORE BREAKFAST Class: Normal  
 Pharmacy: [facility] [address] [facility] AT [address] [place] [address] Ph #: [phone]  
 glipiZIDE SR (GLUCOTROL XL) 5 mg CR tablet 0 Sig: TAKE 1 TABLET BY MOUTH DAILY  Class: Normal  
 Pharmacy: [place] [place] [id number] [facility] AT [address] [place] [address] #: [phone] We Performed the Following [address] [[id number] [facility]] HEPATITIS C AB [54430 CPT(R)] [place] ANNUAL ALCOHOL SCREEN 15 MIN [id number] [facility]] REFERRAL TO NEUROLOGY [LGI04 Custom] Follow-up Instructions Return in about 6 months (around 8/14/2018) for follow up htn. To-Do List   
 02/17/2018 Imaging:  DEXA BONE DENSITY STUDY AXIAL Referral Information Referral ID Referred By Referred To  
  
 [id number] [place], [address] [place] [address], [address] Phone: [phone] Fax: [phone] Visits Status Start Date End Date 1 Open 2/14/18 2/14/19 If your referral has a status of pending review or denied, additional information will be sent to support the outcome of this decision. Patient Instructions Medicare Wellness Visit, Female The best way to live healthy is to have a healthy lifestyle by eating a well-balanced diet, exercising regularly, limiting alcohol and stopping smoking. Regular physical exams and screening tests are another way to keep healthy. Preventive exams provided by your health care provider can find health problems before they become diseases or illnesses. Preventive services including immunizations, screening tests, monitoring and exams can help you take care of your own health. All people over age 72 should have a pneumovax  and and a prevnar shot to prevent pneumonia. These are once in a lifetime unless you and your provider decide differently. All people over 65 should have a yearly flu shot and a tetanus vaccine every 10 years. A bone mass density to screen for osteoporosis or thinning of the bones should be done every 2 years after 65. Screening for diabetes mellitus with a blood sugar test should be done every year. Glaucoma is a disease of the eye due to increased ocular pressure that can lead to blindness and it should be done every year by an eye professional. 
 
Cardiovascular screening tests that check for elevated lipids (fatty part of blood) which can lead to heart disease and strokes should be done every 5 years. Colorectal screening that evaluates for blood or polyps in your colon should be done yearly as a stool test or every five years as a flexible sigmoidoscope or every 10 years as a colonoscopy up to age 76. Breast cancer screening with a mammogram is recommended biennially  for women age 54-69. Screening for cervical cancer with a pap smear and pelvic exam is recommended for women after age 72 years every 2 years up to age 79 or when the provider and patient decide to stop. If there is a history of cervical abnormalities or other increased risk for cancer then the test is recommended yearly. Hepatitis C screening is also recommended for anyone born between 80 through [address]. A shingles vaccine is also recommended once in a lifetime after age 61. Your Medicare Wellness Exam is recommended annually. Here is a list of your current Health Maintenance items with a due date: 
Health Maintenance Due Topic Date Due [facility] Eye Exam  10/31/1950  Glaucoma Screening   10/31/2003  Bone Density Screening  10/31/2003 [facility] Annual Well Visit  01/14/2017  Albumin Urine Test  01/13/2018  Hemoglobin A1C    03/11/2018 Introducing [facility]! Alan Thompson introduces [employer] patient portal. Now you can access parts of your medical record, email your doctor's office, and request medication refills online. 1. In your internet browser, go to [email or web address] 2. Click on the First Time User? Click Here link in the Sign In box. You will see the New Member Sign Up page. 3. Enter your [employer] Access Code exactly as it appears below.  You will not need to use this code after youve completed the sign-up process. If you do not sign up before the expiration date, you must request a new code. · [email or web address] Access Code: [email or web address] Expires: 5/15/2018 11:50 AM 
 
4. Enter the last four digits of your Social Security Number (xxxx) and Date of Birth (mm/dd/yyyy) as indicated and click Submit. You will be taken to the next sign-up page. 5. Create a [employer] ID. This will be your [employer] login ID and cannot be changed, so think of one that is secure and easy to remember. 6. Create a [employer] password. You can change your password at any time. 7. Enter your Password Reset Question and Answer. This can be used at a later time if you forget your password. 8. Enter your e-mail address. You will receive e-mail notification when new information is available in [address]. 9. Click Sign Up. You can now view and download portions of your medical record. 10. Click the Download Summary menu link to download a portable copy of your medical information. If you have questions, please visit the Frequently Asked Questions section of the [email or web address] website. Remember, [employer] is NOT to be used for urgent needs. For medical emergencies, dial 911. Now available from your iPhone and Android! Please provide this summary of care documentation to your next provider. Your primary care clinician is listed as Pari Sexton. If you have any questions after today's visit, please call [phone].

## 2018-02-14 NOTE — PATIENT INSTRUCTIONS

## 2018-02-14 NOTE — PROGRESS NOTES
SUBJECTIVE:   Ms. Kp Sanchez is a 76 y.o. female who is here for follow up of routine medical issues. HTN: Pt's BP in the office today was slightly elevated at 138/71. DM: Pt is compliant in taking glipizide. Patient denies fatigue, dizziness, polydipsia, polyuria, polyphagia, pancreatitis, increased sugar consumption, sore/bleeding gums, blurred vision, or cuts that will not heal. Pt follows up with Dr. Drea Vega 3/5/2018. Pt reports she has had flashes of light in her eyes that presents in the morning for over a year. She also has headaches often. She went to an eye doctor who did not find any acute findings. She also followed up with her DM doctor who did not find anything that could be affecting her eyes. Pt denies head trauma. Pt reports her hearing is usually clear, but she sometimes has issues hearing in her left ear. Medicare Wellness Visit: Pt denies any changes to their vision or memory. Pt reports they feel safe in their home and does not have any safety equipment. Pt has created an ACP, but has not yet filed it with [employer]. PREVENTIVE:  Colonoscopy: declined   Mammogram: declined   Dexa: due   Flu: declined   Ophthalmology: current     At this time, she is otherwise doing well and has brought no other complaints to my attention today. For a list of the medical issues addressed today, see the assessment and plan below. PMH:   Past Medical History:   Diagnosis Date    Cancer ([facility] [address]     skin    Diabetes ([facility] [address]     Hypertension     Hypothyroidism 1993     PSH:  has a past surgical history that includes hx partial thyroidectomy (1993) and hx orthopaedic. All: has No Known Allergies.    MEDS:   Current Outpatient Prescriptions   Medication Sig    levothyroxine (SYNTHROID) 50 mcg tablet TAKE 1 TABLET BY MOUTH DAILY BEFORE BREAKFAST    glipiZIDE SR (GLUCOTROL XL) 5 mg CR tablet TAKE 1 TABLET BY MOUTH DAILY    ACCU-CHEK ALIRIO PLUS TEST STRP strip USE TO CHECK BLOOD SUGAR TWICE DAILY     No current facility-administered medications for this visit. FH: family history includes Cancer in her brother; Diabetes in her father, maternal aunt, and sister; Hypertension in her brother; No Known Problems in her mother. SH:  reports that she has never smoked. She has never used smokeless tobacco. She reports that she does not drink alcohol or use illicit drugs. Review of Systems - History obtained from the patient  General ROS: no fever, chills, fatigue, body aches  Psychological ROS: no change in anxiety, depression, SI/HI  Ophthalmic ROS: aura (flashes of light)  ENT ROS: mild changes in hearing no dysphagia, otalgia, otorrhea, rhinorrhea, post nasal drip  Respiratory ROS: no cough, shortness of breath, or wheezing  Cardiovascular ROS: no chest pain or dyspnea on exertion  Gastrointestinal ROS: no abdominal pain, change in bowel habits, or black or bloody stools  Genito-Urinary ROS: no frequency, urgency, incontinence, dysuria, hematouria  Musculoskeletal ROS: no arthralagia, myalgia  Neurological ROS: headaches dizziness, lightheadedness, tremors, seizures  Dermatological ROS: no rash or lesions    OBJECTIVE:   Vitals:   Visit Vitals    /71 (BP 1 Location: Left arm, BP Patient Position: Sitting)    Pulse 100    Temp 98.3 °F (36.8 °C) (Oral)    Resp 18    Ht 5' 3\" (1.6 m)    Wt 135 lb (61.2 kg)    SpO2 99%    BMI 23.91 kg/m2      Gen: Pleasant 76 y.o.  female in NAD. HEENT: PERRLA. EOMI. OP moist and pink. Neck: Supple. No LAD. HEART: RRR, No M/G/R.      LUNGS: CTAB No W/R. ABDOMEN: S, NT, ND, BS+. EXTREMITIES: Warm. No C/C/E.    MUSCULOSKELETAL: Normal ROM, muscle strength 5/5 all groups. NEURO: Alert and oriented x 3. Cranial nerves grossly intact. No focal sensory or motor deficits noted. SKIN: Warm. Dry. No rashes or other lesions noted. ASSESSMENT/ PLAN: Diagnoses and all orders for this visit:    1.  Medicare annual wellness visit, subsequent    2. Type 2 diabetes mellitus without complication, without long-term current use of insulin (HCC)  -     glipiZIDE SR (GLUCOTROL XL) 5 mg CR tablet; TAKE 1 TABLET BY MOUTH DAILY    3. Hypertension, essential, benign    4. Acquired hypothyroidism  -     levothyroxine (SYNTHROID) 50 mcg tablet; TAKE 1 TABLET BY MOUTH DAILY BEFORE BREAKFAST    5. Migraine with aura and without status migrainosus, not intractable  -     REFERRAL TO NEUROLOGY    6. Post-menopausal  -     Dexa Bone Density Study Axial ([id number]); Future    7. Screening for alcoholism  -     Annual  Alcohol Screen 15 min ()    8. Screening for depression  -     Depression Screen Annual    9. Need for hepatitis C screening test  -     HEPATITIS C AB    10. Disorder of bone and cartilage  -     Dexa Bone Density Study Axial ([id number]); Future        ICD-10-CM ICD-9-CM    1. Medicare annual wellness visit, subsequent Z00.00 V70.0    2. Type 2 diabetes mellitus without complication, without long-term current use of insulin (HCC) E11.9 250.00 glipiZIDE SR (GLUCOTROL XL) 5 mg CR tablet   3. Hypertension, essential, benign I10 401.1    4. Acquired hypothyroidism E03.9 244.9 levothyroxine (SYNTHROID) 50 mcg tablet   5. Migraine with aura and without status migrainosus, not intractable G43.109 346.00 REFERRAL TO NEUROLOGY   6. Post-menopausal Z78.0 V49.81 DEXA BONE DENSITY STUDY AXIAL   7. Screening for alcoholism Z13.89 V79.1 [place] ANNUAL ALCOHOL SCREEN 15 MIN   8. Screening for depression Z13.89 V79.0 DEPRESSION SCREEN ANNUAL   9. Need for hepatitis C screening test Z11.59 V73.89 HEPATITIS C AB   10. Disorder of bone and cartilage M89.9 733.90 DEXA BONE DENSITY STUDY AXIAL    M94.9       1. Medicare Annual Wellness Visit  See attached note. 2. DM type 2  Diabetes is stable. I advised pt to continue current dose of glipizide, avoid sugars and starches, and to increase exercise when possible. 3. Hypertension  BP seems to be controlled.  I recommended eating a low sodium diet and increasing exercise. 4. Hypothyroidism  I recommended pt continue on current dose of levothyroxine (refill given). 5. Migraine with aura  I advised pt to follow up with a neurologist for further evaluation (referral given). 6. Post-menopausal   Pt is due for a DEXA scan to screen for osteoporosis. Order given today. 7. Screening for Alcoholism   Pt denies any consumption of EtOH. 8. Screening for Depression  Pt denies any feelings of depression. 9. Hep C Screening  Pt is a part of the age group that may have been exposed to Hep C in the past. I ordered a Hep C screening to test for exposure. 10. Disorder of bone and cartilage   Pt is due for a DEXA scan to screen for osteoporosis. Order given today. Follow-up Disposition:  Return in about 6 months (around 8/14/2018) for follow up htn. I have reviewed the patient's medications and risks/side effects/benefits were discussed. Diagnosis(-es) explained to patient and questions answered. Literature provided where appropriate.      Written by Joy Burkett, as dictated by Gayatri Barlow MD.

## 2018-02-15 LAB — HCV AB S/CO SERPL IA: <0.1 S/CO RATIO (ref 0–0.9)

## 2018-03-30 ENCOUNTER — OFFICE VISIT (OUTPATIENT)
Dept: NEUROLOGY | Age: 78
End: 2018-03-30

## 2018-03-30 VITALS
HEIGHT: 63 IN | DIASTOLIC BLOOD PRESSURE: 72 MMHG | BODY MASS INDEX: 23.35 KG/M2 | OXYGEN SATURATION: 98 % | HEART RATE: 99 BPM | SYSTOLIC BLOOD PRESSURE: 140 MMHG | WEIGHT: 131.8 LBS

## 2018-03-30 DIAGNOSIS — H53.8 FLASHING LIGHT: ICD-10-CM

## 2018-03-30 DIAGNOSIS — G44.229 CHRONIC TENSION-TYPE HEADACHE, NOT INTRACTABLE: Primary | ICD-10-CM

## 2018-03-30 RX ORDER — AMITRIPTYLINE HYDROCHLORIDE 10 MG/1
10 TABLET, FILM COATED ORAL
Qty: 30 TAB | Refills: 2 | Status: SHIPPED | OUTPATIENT
Start: 2018-03-30 | End: 2018-05-02 | Stop reason: SDUPTHER

## 2018-03-30 NOTE — LETTER
3/30/2018 11:56 AM 
 
Patient:    Ambrose Santos YOB: 1940 Date of Visit:    3/30/2018 Dear Nery Tyler MD 
 
Thank you for referring Ms. Ju Hanks to me for evaluation/treatment. Below are the relevant portions of my assessment and plan of care. Neurology Note 
 
 
03/30/18 Chief Complaint Patient presents with  New Patient Headaches/flashing in eyes day & night HPI/Subjective Ambrose Santos is a 76 y.o. female who presented to the neurology office for management of headaches that has been going on for the last 1 year. The headaches are daily and it is frontal and occipital in location. There is no associated nausea, vomiting, photophobia or phonophobia. She does have the pain and describes it as 10 out of 10 in severity. She does also have flashes of light in front of both of her eyes. It gets worse when she closes her eyes. The headaches and aching and dull in character. She has not had any imaging of the brain. She has been referred here for further evaluation. Current Outpatient Prescriptions Medication Sig  
 amitriptyline (ELAVIL) 10 mg tablet Take 1 Tab by mouth nightly.  levothyroxine (SYNTHROID) 50 mcg tablet TAKE 1 TABLET BY MOUTH DAILY BEFORE BREAKFAST  glipiZIDE SR (GLUCOTROL XL) 5 mg CR tablet TAKE 1 TABLET BY MOUTH DAILY  ACCU-CHEK ALIRIO PLUS TEST STRP strip USE TO CHECK BLOOD SUGAR TWICE DAILY No current facility-administered medications for this visit. No Known Allergies Past Medical History:  
Diagnosis Date  Cancer ([address] skin  Diabetes ([facility] [address]  Hypertension  Hypothyroidism 1993 Past Surgical History:  
Procedure Laterality Date  HX ORTHOPAEDIC    
 rotator cuff repair- left [address] [facility] Family History Problem Relation Age of Onset  No Known Problems Mother  Diabetes Father  Diabetes Sister  Cancer Brother Prostate  Hypertension Brother  Diabetes Maternal Aunt Social History Substance Use Topics  Smoking status: Never Smoker  Smokeless tobacco: Never Used  Alcohol use No  
 
 
REVIEW OF SYSTEMS:  
A ten system review of constitutional, cardiovascular, respiratory, musculoskeletal, endocrine, skin, SHEENT, genitourinary, psychiatric and neurologic systems was obtained and is unremarkable with the exception of headaches EXAMINATION:  
Visit Vitals  /72  Pulse 99  
 Ht 5' 3\" (1.6 m)  Wt 131 lb 12.8 oz (59.8 kg)  SpO2 98%  BMI 23.35 kg/m2 General:  
General appearance: Pt is in no acute distress Distal pulses are preserved Fundoscopic Exam: Normal 
 
Neurological Examination:  
Mental Status: AAO x3. Speech is fluent. Follows commands, has normal fund of knowledge, attention, short term recall, comprehension and insight. Cranial Nerves: Visual fields are full. PERRL, Extraocular movements are full. Facial sensation intact. Facial movement intact. Hearing intact to conversation. Palate elevates symmetrically. Shoulder shrug symmetric. Tongue midline. Motor: Strength is 5/5 in all 4 ext. No atrophy. Tone: Normal 
 
Sensation: Normal to light touch Reflexes: DTRs 2+ throughout. Coordination/Cerebellar: Intact to finger-nose-finger Gait: Casual gait is normal.  
 
Skin: No significant bruising or lacerations. Laboratory review:  
Results for orders placed or performed in visit on 02/14/18 HEPATITIS C AB Result Value Ref Range Hep C Virus Ab <0.1 0.0 - 0.9 s/co ratio Imaging review: 
None Documentation review: 
None Assessment/Plan:  
Yvan Edwards is a 76 y.o. female who presented to the neurology office for management of chronic daily headaches. Based on the description he seemed to be chronic tension type headache. She is also having mild auras of flashes of light.   I am going to start the patient on amitriptyline 10 mg p.o. nightly and get imaging of the brain to check for any mass lesions/stroke. Follow-up in 6 weeks PHQ over the last two weeks 3/30/2018 Little interest or pleasure in doing things Not at all Feeling down, depressed or hopeless Not at all Total Score PHQ 2 0 Primary care to address possible depression if PHQ-9 score is more than 9. ICD-10-CM ICD-9-CM 1. Chronic tension-type headache, not intractable G44.229 339.12 amitriptyline (ELAVIL) 10 mg tablet MRI BRAIN WO CONT 2. Flashing light H53.8 368.8 amitriptyline (ELAVIL) 10 mg tablet MRI BRAIN WO CONT Thank you for allowing me to participate in the care of Ms. Chun. Please feel free to contact me if you have any questions. Hector Krueger MD 
Neurologist 
 
CC: Andres Churchill MD 
Fax: [phone] This note was created using voice recognition software. Despite editing, there may be syntax errors. If you have questions, please do not hesitate to call me. I look forward to following Ms. Chun along with you. Sincerely, Hector Krueger MD

## 2018-03-30 NOTE — MR AVS SNAPSHOT
[address], 
[address], [address] [facility] 
[phone] Patient: Subhash Desai MRN: [id number] :1940 Visit Information Date & Time Provider Department Dept. Phone Encounter #  
 3/30/2018 11:00 AM Tacho Calhoun MD [facility] [phone] [id number] Your Appointments 2018 12:10 PM  
Follow Up with Shiva Cerrato MD  
[facility] [address] [facility]) Appt Note: f/u Diabetes  & Thyroid; f/u Diabetes & Thyroid [facility] [address] [id number] [address] [place] [address] Upcoming Health Maintenance Date Due Bone Densitometry (Dexa) Screening 10/31/2003 MICROALBUMIN Q1 2018 HEMOGLOBIN A1C Q6M 3/11/2018 LIPID PANEL Q1 2018 FOOT EXAM Q1 2018 EYE EXAM RETINAL OR DILATED Q1 10/18/2018 Pneumococcal 65+ Low/Medium Risk (2 of 2 - PPSV23) 2019 MEDICARE YEARLY EXAM 2/15/2019 GLAUCOMA SCREENING Q2Y 10/18/2019 DTaP/Tdap/Td series (2 - Td) 2028 Allergies as of 3/30/2018  Review Complete On: 3/30/2018 By: Tacho Calhoun MD  
 No Known Allergies Current Immunizations  Never Reviewed No immunizations on file. Not reviewed this visit You Were Diagnosed With   
  
 Codes Comments Chronic tension-type headache, not intractable    -  Primary ICD-10-CM: P36.808 ICD-9-CM: 339.12 Flashing light     ICD-10-CM: H53.8 ICD-9-CM: 368.8 Vitals BP Pulse Height(growth percentile) Weight(growth percentile) SpO2 BMI  
 140/72 99 5' 3\" (1.6 m) 131 lb 12.8 oz (59.8 kg) 98% 23.35 kg/m2 OB Status Smoking Status Postmenopausal Never Smoker BMI and BSA Data Body Mass Index Body Surface Area  
 23.35 kg/m 2 1.63 m 2 Preferred Pharmacy Pharmacy Name Phone [facility] [address]  [place] [phone] Your Updated Medication List  
  
   
This list is accurate as of 3/30/18 11:37 AM.  Always use your most recent med list.  
  
  
  
  
 ACCU-CHEK ALIRIO PLUS TEST STRP strip Generic drug:  glucose blood VI test strips USE TO CHECK BLOOD SUGAR TWICE DAILY  
  
 amitriptyline 10 mg tablet Commonly known as:  ELAVIL Take 1 Tab by mouth nightly. glipiZIDE SR 5 mg CR tablet Commonly known as:  GLUCOTROL XL  
TAKE 1 TABLET BY MOUTH DAILY  
  
 levothyroxine 50 mcg tablet Commonly known as:  SYNTHROID  
TAKE 1 TABLET BY MOUTH DAILY BEFORE BREAKFAST Prescriptions Sent to Pharmacy Refills  
 amitriptyline (ELAVIL) 10 mg tablet 2 Sig: Take 1 Tab by mouth nightly. Class: Normal  
 Pharmacy: [facility] [address] Ph #: [phone] Route: Oral  
  
To-Do List   
 03/30/2018 Imaging:  MRI BRAIN WO CONT Patient Instructions 1. MRI brain 2. Start amitriptyline 10 mg p.o. nightly 3. Follow-up in 6 weeks A Healthy Lifestyle: Care Instructions Your Care Instructions A healthy lifestyle can help you feel good, stay at a healthy weight, and have plenty of energy for both work and play. A healthy lifestyle is something you can share with your whole family. A healthy lifestyle also can lower your risk for serious health problems, such as high blood pressure, heart disease, and diabetes. You can follow a few steps listed below to improve your health and the health of your family. Follow-up care is a key part of your treatment and safety. Be sure to make and go to all appointments, and call your doctor if you are having problems. It's also a good idea to know your test results and keep a list of the medicines you take. How can you care for yourself at home? · Do not eat too much sugar, fat, or fast foods. You can still have dessert and treats now and then. The goal is moderation. · Start small to improve your eating habits. Pay attention to portion sizes, drink less juice and soda pop, and eat more fruits and vegetables. ¨ Eat a healthy amount of food. A 3-ounce serving of meat, for example, is about the size of a deck of cards. Fill the rest of your plate with vegetables and whole grains. ¨ Limit the amount of soda and sports drinks you have every day. Drink more water when you are thirsty. ¨ Eat at least 5 servings of fruits and vegetables every day. It may seem like a lot, but it is not hard to reach this goal. A serving or helping is 1 piece of fruit, 1 cup of vegetables, or 2 cups of leafy, raw vegetables. Have an apple or some carrot sticks as an afternoon snack instead of a candy bar. Try to have fruits and/or vegetables at every meal. 
· Make exercise part of your daily routine. You may want to start with simple activities, such as walking, bicycling, or slow swimming. Try to be active 30 to 60 minutes every day. You do not need to do all 30 to 60 minutes all at once. For example, you can exercise 3 times a day for 10 or 20 minutes. Moderate exercise is safe for most people, but it is always a good idea to talk to your doctor before starting an exercise program. 
· Keep moving. [place] Hoy[place] the lawn, work in the garden, or DIY. Take the stairs instead of the elevator at work. · If you smoke, quit. People who smoke have an increased risk for heart attack, stroke, cancer, and other lung illnesses. Quitting is hard, but there are ways to boost your chance of quitting tobacco for good. ¨ Use nicotine gum, patches, or lozenges. ¨ Ask your doctor about stop-smoking programs and medicines. ¨ Keep trying.  
In addition to reducing your risk of diseases in the future, you will notice some benefits soon after you stop using tobacco. If you have shortness of breath or asthma symptoms, they will likely get better within a few weeks after you quit. · Limit how much alcohol you drink. Moderate amounts of alcohol (up to 2 drinks a day for men, 1 drink a day for women) are okay. But drinking too much can lead to liver problems, high blood pressure, and other health problems. Family health If you have a family, there are many things you can do together to improve your health. · Eat meals together as a family as often as possible. · Eat healthy foods. This includes fruits, vegetables, lean meats and dairy, and whole grains. · Include your family in your fitness plan. Most people think of activities such as jogging or tennis as the way to fitness, but there are many ways you and your family can be more active. Anything that makes you breathe hard and gets your heart pumping is exercise. Here are some tips: 
¨ Walk to do errands or to take your child to school or the bus. ¨ Go for a family bike ride after dinner instead of watching TV. Where can you learn more? Go to http://alejandrinaHealthcareMagicvee.info/. Enter F550 in the search box to learn more about \"A Healthy Lifestyle: Care Instructions. \" Current as of: May 12, 2017 Content Version: 11.4 © 5258-1090 Healthwise, Incorporated. Care instructions adapted under license by [employer] (which disclaims liability or warranty for this information). If you have questions about a medical condition or this instruction, always ask your healthcare professional. [place] [address] any warranty or liability for your use of this information. Please be advised there is a $25 fee for all paperwork to be completed from our  providers. This is to be paid by the patient prior to picking up the completed forms. Introducing [facility] & HEALTH SERVICES! [facility] introduces Concard patient portal. Now you can access parts of your medical record, email your doctor's office, and request medication refills online. 1. In your internet browser, go to [email or web address] 2. Click on the First Time User? Click Here link in the Sign In box. You will see the New Member Sign Up page. 3. Enter your Concard Access Code exactly as it appears below. You will not need to use this code after youve completed the sign-up process. If you do not sign up before the expiration date, you must request a new code. · Concard Access Code: [email or web address] Expires: 5/15/2018 12:50 PM 
 
4. Enter the last four digits of your Social Security Number (xxxx) and Date of Birth (mm/dd/yyyy) as indicated and click Submit. You will be taken to the next sign-up page. 5. Create a Concard ID. This will be your Concard login ID and cannot be changed, so think of one that is secure and easy to remember. 6. Create a Concard password. You can change your password at any time. 7. Enter your Password Reset Question and Answer. This can be used at a later time if you forget your password. 8. Enter your e-mail address. You will receive e-mail notification when new information is available in [address] 9. Click Sign Up. You can now view and download portions of your medical record. 10. Click the Download Summary menu link to download a portable copy of your medical information. If you have questions, please visit the Frequently Asked Questions section of the Concard website. Remember, Concard is NOT to be used for urgent needs. For medical emergencies, dial 911. Now available from your iPhone and Android! Please provide this summary of care documentation to your next provider. Your primary care clinician is listed as Florence William. If you have any questions after today's visit, please call [phone].

## 2018-03-30 NOTE — PATIENT INSTRUCTIONS
1. MRI brain  2. Start amitriptyline 10 mg p.o. nightly  3. Follow-up in 6 weeks     A Healthy Lifestyle: Care Instructions  Your Care Instructions    A healthy lifestyle can help you feel good, stay at a healthy weight, and have plenty of energy for both work and play. A healthy lifestyle is something you can share with your whole family. A healthy lifestyle also can lower your risk for serious health problems, such as high blood pressure, heart disease, and diabetes. You can follow a few steps listed below to improve your health and the health of your family. Follow-up care is a key part of your treatment and safety. Be sure to make and go to all appointments, and call your doctor if you are having problems. It's also a good idea to know your test results and keep a list of the medicines you take. How can you care for yourself at home? · Do not eat too much sugar, fat, or fast foods. You can still have dessert and treats now and then. The goal is moderation. · Start small to improve your eating habits. Pay attention to portion sizes, drink less juice and soda pop, and eat more fruits and vegetables. ¨ Eat a healthy amount of food. A 3-ounce serving of meat, for example, is about the size of a deck of cards. Fill the rest of your plate with vegetables and whole grains. ¨ Limit the amount of soda and sports drinks you have every day. Drink more water when you are thirsty. ¨ Eat at least 5 servings of fruits and vegetables every day. It may seem like a lot, but it is not hard to reach this goal. A serving or helping is 1 piece of fruit, 1 cup of vegetables, or 2 cups of leafy, raw vegetables. Have an apple or some carrot sticks as an afternoon snack instead of a candy bar. Try to have fruits and/or vegetables at every meal.  · Make exercise part of your daily routine. You may want to start with simple activities, such as walking, bicycling, or slow swimming. Try to be active 30 to 60 minutes every day. You do not need to do all 30 to 60 minutes all at once. For example, you can exercise 3 times a day for 10 or 20 minutes. Moderate exercise is safe for most people, but it is always a good idea to talk to your doctor before starting an exercise program.  · Keep moving. Raya Hoop the lawn, work in the garden, or [employer]. Take the stairs instead of the elevator at work. · If you smoke, quit. People who smoke have an increased risk for heart attack, stroke, cancer, and other lung illnesses. Quitting is hard, but there are ways to boost your chance of quitting tobacco for good. ¨ Use nicotine gum, patches, or lozenges. ¨ Ask your doctor about stop-smoking programs and medicines. ¨ Keep trying. In addition to reducing your risk of diseases in the future, you will notice some benefits soon after you stop using tobacco. If you have shortness of breath or asthma symptoms, they will likely get better within a few weeks after you quit. · Limit how much alcohol you drink. Moderate amounts of alcohol (up to 2 drinks a day for men, 1 drink a day for women) are okay. But drinking too much can lead to liver problems, high blood pressure, and other health problems. Family health  If you have a family, there are many things you can do together to improve your health. · Eat meals together as a family as often as possible. · Eat healthy foods. This includes fruits, vegetables, lean meats and dairy, and whole grains. · Include your family in your fitness plan. Most people think of activities such as jogging or tennis as the way to fitness, but there are many ways you and your family can be more active. Anything that makes you breathe hard and gets your heart pumping is exercise. Here are some tips:  ¨ Walk to do errands or to take your child to school or the bus. ¨ Go for a family bike ride after dinner instead of watching TV. Where can you learn more? Go to http://alejandrina-vee.info/.   Enter I405 in the search box to learn more about \"A Healthy Lifestyle: Care Instructions. \"  Current as of: May 12, 2017  Content Version: 11.4  © 7313-2829 Healthwise, Incorporated. Care instructions adapted under license by [employer] (which disclaims liability or warranty for this information). If you have questions about a medical condition or this instruction, always ask your healthcare professional. [place] [address] any warranty or liability for your use of this information. Please be advised there is a $25 fee for all paperwork to be completed from our  providers. This is to be paid by the patient prior to picking up the completed forms.

## 2018-03-30 NOTE — PROGRESS NOTES
Neurology Note      03/30/18    Chief Complaint   Patient presents with    New Patient     Headaches/flashing in eyes day & night       HPI/Subjective  Diana Pratt is a 76 y.o. female who presented to the neurology office for management of headaches that has been going on for the last 1 year. The headaches are daily and it is frontal and occipital in location. There is no associated nausea, vomiting, photophobia or phonophobia. She does have the pain and describes it as 10 out of 10 in severity. She does also have flashes of light in front of both of her eyes. It gets worse when she closes her eyes. The headaches and aching and dull in character. She has not had any imaging of the brain. She has been referred here for further evaluation. Current Outpatient Prescriptions   Medication Sig    amitriptyline (ELAVIL) 10 mg tablet Take 1 Tab by mouth nightly.  levothyroxine (SYNTHROID) 50 mcg tablet TAKE 1 TABLET BY MOUTH DAILY BEFORE BREAKFAST    glipiZIDE SR (GLUCOTROL XL) 5 mg CR tablet TAKE 1 TABLET BY MOUTH DAILY    ACCU-CHEK ALIRIO PLUS TEST STRP strip USE TO CHECK BLOOD SUGAR TWICE DAILY     No current facility-administered medications for this visit.       No Known Allergies  Past Medical History:   Diagnosis Date    Cancer ([facility] [address]     skin    Diabetes ([facility] [address]     Hypertension     Hypothyroidism 1993     Past Surgical History:   Procedure Laterality Date    HX ORTHOPAEDIC      rotator cuff repair- left    HX PARTIAL THYROIDECTOMY  1993     Family History   Problem Relation Age of Onset    No Known Problems Mother     Diabetes Father     Diabetes Sister     Cancer Brother      Prostate    Hypertension Brother     Diabetes Maternal Aunt      Social History   Substance Use Topics    Smoking status: Never Smoker    Smokeless tobacco: Never Used    Alcohol use No       REVIEW OF SYSTEMS:   A ten system review of constitutional, cardiovascular, respiratory, musculoskeletal, endocrine, skin, [address], genitourinary, psychiatric and neurologic systems was obtained and is unremarkable with the exception of headaches     EXAMINATION:   Visit Vitals    /72    Pulse 99    Ht 5' 3\" (1.6 m)    Wt 131 lb 12.8 oz (59.8 kg)    SpO2 98%    BMI 23.35 kg/m2        General:   General appearance: Pt is in no acute distress   Distal pulses are preserved  Fundoscopic Exam: Normal    Neurological Examination:   Mental Status: AAO x3. Speech is fluent. Follows commands, has normal fund of knowledge, attention, short term recall, comprehension and insight. Cranial Nerves: Visual fields are full. PERRL, Extraocular movements are full. Facial sensation intact. Facial movement intact. Hearing intact to conversation. Palate elevates symmetrically. Shoulder shrug symmetric. Tongue midline. Motor: Strength is 5/5 in all 4 ext. No atrophy. Tone: Normal    Sensation: Normal to light touch    Reflexes: DTRs 2+ throughout. Coordination/Cerebellar: Intact to finger-nose-finger     Gait: Casual gait is normal.     Skin: No significant bruising or lacerations. Laboratory review:   Results for orders placed or performed in visit on 02/14/18   HEPATITIS C AB   Result Value Ref Range    Hep C Virus Ab <0.1 0.0 - 0.9 s/co ratio       Imaging review:  None     Documentation review:  None    Assessment/Plan:   Yvan Edwards is a 76 y.o. female who presented to the neurology office for management of chronic daily headaches. Based on the description he seemed to be chronic tension type headache. She is also having mild auras of flashes of light. I am going to start the patient on amitriptyline 10 mg p.o. nightly and get imaging of the brain to check for any mass lesions/stroke.     Follow-up in 6 weeks    PHQ over the last two weeks 3/30/2018   Little interest or pleasure in doing things Not at all   Feeling down, depressed or hopeless Not at all   Total Score PHQ 2 0     Primary care to address possible depression if PHQ-9 score is more than 9. ICD-10-CM ICD-9-CM    1. Chronic tension-type headache, not intractable G44.229 339.12 amitriptyline (ELAVIL) 10 mg tablet      MRI BRAIN WO CONT   2. Flashing light H53.8 368.8 amitriptyline (ELAVIL) 10 mg tablet      MRI BRAIN WO CONT      Thank you for allowing me to participate in the care of Ms. Chun. Please feel free to contact me if you have any questions. Esha Santo MD  Neurologist    CC: Tod Uriostegui MD  Fax: [phone]    This note was created using voice recognition software. Despite editing, there may be syntax errors.

## 2018-04-09 ENCOUNTER — TELEPHONE (OUTPATIENT)
Dept: NEUROLOGY | Age: 78
End: 2018-04-09

## 2018-04-09 NOTE — TELEPHONE ENCOUNTER
----- Message from Jagruti Villalta sent at 4/9/2018 10:14 AM EDT -----  Regarding: Dr Lindsay Pulliam  Pt stated she has called for an mri to the number that was given to her , left messages and no one has called back to schedule the mri.     Contact [phone]

## 2018-04-09 NOTE — TELEPHONE ENCOUNTER
Spoke with patient to let known I have received her message and I will call the patient care team. I called the patient care team and I have left a message for care team to call patient at [phone].

## 2018-05-02 DIAGNOSIS — H53.8 FLASHING LIGHT: ICD-10-CM

## 2018-05-02 DIAGNOSIS — G44.229 CHRONIC TENSION-TYPE HEADACHE, NOT INTRACTABLE: ICD-10-CM

## 2018-05-02 RX ORDER — AMITRIPTYLINE HYDROCHLORIDE 10 MG/1
TABLET, FILM COATED ORAL
Qty: 90 TAB | Refills: 2 | Status: SHIPPED | OUTPATIENT
Start: 2018-05-02 | End: 2021-11-22 | Stop reason: ALTCHOICE

## 2018-05-11 ENCOUNTER — HOSPITAL ENCOUNTER (OUTPATIENT)
Dept: MRI IMAGING | Age: 78
Discharge: HOME | End: 2018-05-11
Attending: PSYCHIATRY & NEUROLOGY
Payer: MEDICARE

## 2018-05-11 DIAGNOSIS — G44.229 CHRONIC TENSION-TYPE HEADACHE, NOT INTRACTABLE: ICD-10-CM

## 2018-05-11 DIAGNOSIS — H53.8 FLASHING LIGHT: ICD-10-CM

## 2018-05-11 PROCEDURE — 70551 MRI BRAIN STEM W/O DYE: CPT

## 2018-05-25 ENCOUNTER — OFFICE VISIT (OUTPATIENT)
Dept: NEUROLOGY | Age: 78
End: 2018-05-25

## 2018-05-25 VITALS
BODY MASS INDEX: 23.21 KG/M2 | HEART RATE: 90 BPM | WEIGHT: 131 LBS | DIASTOLIC BLOOD PRESSURE: 64 MMHG | HEIGHT: 63 IN | OXYGEN SATURATION: 98 % | SYSTOLIC BLOOD PRESSURE: 130 MMHG

## 2018-05-25 DIAGNOSIS — G44.229 CHRONIC TENSION-TYPE HEADACHE, NOT INTRACTABLE: Primary | ICD-10-CM

## 2018-05-25 NOTE — LETTER
5/25/2018 11:43 AM 
 
Patient:    Isaias Alfredo YOB: 1940 Date of Visit:    5/25/2018 Dear Jennifer Guerra MD 
 
Thank you for referring Ms. Jayesh Kang to me for evaluation/treatment. Below are the relevant portions of my assessment and plan of care. Neurology Note 
 
 
05/25/18 Chief Complaint Patient presents with  Follow-up  
  headaches better HPI/Subjective Isaias Alfredo is a 76 y.o. female who presented to the neurology office for management of headaches that has been going on for the since 2017. Regarding the headaches, the headaches are daily and it is frontal and occipital in location. There is no associated nausea, vomiting, photophobia or phonophobia. She does have the pain and describes it as 10 out of 10 in severity. She does also have flashes of light in front of both of her eyes. It gets worse when she closes her eyes. The headaches and aching and dull in character. She has not had any imaging of the brain. She has been referred here for further evaluation. Baseline headache frequency: Daily Headache frequency now: Significant improvement. Current Outpatient Prescriptions Medication Sig  
 amitriptyline (ELAVIL) 10 mg tablet TAKE 1 TABLET BY MOUTH EVERY NIGHT  levothyroxine (SYNTHROID) 50 mcg tablet TAKE 1 TABLET BY MOUTH DAILY BEFORE BREAKFAST  glipiZIDE SR (GLUCOTROL XL) 5 mg CR tablet TAKE 1 TABLET BY MOUTH DAILY  ACCU-CHEK ALIRIO PLUS TEST STRP strip USE TO CHECK BLOOD SUGAR TWICE DAILY No current facility-administered medications for this visit. No Known Allergies Past Medical History:  
Diagnosis Date  Cancer ([address] skin  Diabetes ([facility] [address]  Hypertension  Hypothyroidism 1993 Past Surgical History:  
Procedure Laterality Date  HX ORTHOPAEDIC    
 rotator cuff repair- left [facility] Family History Problem Relation Age of Onset  No Known Problems Mother  Diabetes Father  Diabetes Sister  Cancer Brother Prostate  Hypertension Brother  Diabetes Maternal Aunt Social History Substance Use Topics  Smoking status: Never Smoker  Smokeless tobacco: Never Used  Alcohol use No  
 
 
REVIEW OF SYSTEMS:  
A ten system review of constitutional, cardiovascular, respiratory, musculoskeletal, endocrine, skin, SHEENT, genitourinary, psychiatric and neurologic systems was obtained and is unremarkable with the exception of headaches EXAMINATION:  
Visit Vitals  /64  Pulse 90  
 Ht 5' 3\" (1.6 m)  Wt 131 lb (59.4 kg)  SpO2 98%  BMI 23.21 kg/m2 General:  
General appearance: Pt is in no acute distress Distal pulses are preserved Neurological Examination:  
Mental Status: AAO x3. Speech is fluent. Follows commands, has normal fund of knowledge, attention, short term recall, comprehension and insight. Cranial Nerves: Visual fields are full. PERRL, Extraocular movements are full. Facial sensation intact. Facial movement intact. Hearing intact to conversation. Palate elevates symmetrically. Shoulder shrug symmetric. Tongue midline. Motor: Strength is 5/5 in all 4 ext. No atrophy. Tone: Normal 
 
Sensation: Normal to light touch Coordination/Cerebellar: Intact to finger-nose-finger Gait: Casual gait is normal.  
 
Skin: No significant bruising or lacerations. Laboratory review:  
Results for orders placed or performed in visit on 02/14/18 HEPATITIS C AB Result Value Ref Range Hep C Virus Ab <0.1 0.0 - 0.9 s/co ratio Imaging review: MRI brain Normal 
 
Documentation review: 
None Assessment/Plan:  
Erica Dominguez is a 76 y.o. female who presented to the neurology office for management of chronic daily headaches. The patient was started on amitriptyline 10 mg p.o. nightly and there has been significant improvement in the headaches.   Imaging study of the brain has been normal. Will continue the same. Follow-up in 6 months Over 25 minutes was spent with the patient of which > 50% of the visit was spent counseling on diagnosis, management, and treatment of the diagnosis. I did discuss about tension type headache, medications and the side effects. PHQ over the last two weeks 5/25/2018 Little interest or pleasure in doing things Not at all Feeling down, depressed or hopeless Not at all Total Score PHQ 2 0 Primary care to address possible depression if PHQ-9 score is more than 9. ICD-10-CM ICD-9-CM 1. Chronic tension-type headache, not intractable G44.229 339.12 Thank you for allowing me to participate in the care of Ms. Chun. Please feel free to contact me if you have any questions. Jody Dodd MD 
Neurologist 
 
CC: Randal Gonzalez MD 
Fax: [phone] This note was created using voice recognition software. Despite editing, there may be syntax errors. If you have questions, please do not hesitate to call me. I look forward to following Ms. Chun along with you. Sincerely, Jody Dodd MD

## 2018-05-25 NOTE — MR AVS SNAPSHOT
[address] [facility] [address], 
[id number], [address] [facility] 
[phone] Patient: Monik Gaming MRN: [id number] :1940 Visit Information Date & Time Provider Department Dept. Phone Encounter #  
 2018 11:00 AM Ren Gar MD Neurology Clinic at [facility] [phone] [id number] Your Appointments 2018 12:10 PM  
Follow Up with Alanna Brewster MD  
[facility] [address] [place] [address]) Appt Note: f/u Diabetes  & Thyroid; f/u Diabetes & Thyroid One [facility] [address] [phone] [address]  
  
    
 2018 10:40 AM  
Follow Up with Ren Gar MD  
Neurology Clinic at [facility] [address] [facility]) Appt Note: [address], 
[address], [address] [address]  
[address], [address], [address] [facility] [address] [address] Upcoming Health Maintenance Date Due Bone Densitometry (Dexa) Screening 10/31/2003 MICROALBUMIN Q1 2018 HEMOGLOBIN A1C Q6M 3/11/2018 LIPID PANEL Q1 2018 Influenza Age 5 to Adult 2018 FOOT EXAM Q1 2018 EYE EXAM RETINAL OR DILATED Q1 10/18/2018 Pneumococcal 65+ Low/Medium Risk (2 of 2 - PPSV23) 2019 MEDICARE YEARLY EXAM 2/15/2019 GLAUCOMA SCREENING Q2Y 10/18/2019 DTaP/Tdap/Td series (2 - Td) 2028 Allergies as of 2018  Review Complete On: 2018 By: Ren Gar MD  
 No Known Allergies Current Immunizations  Never Reviewed No immunizations on file. Not reviewed this visit You Were Diagnosed With   
  
 Codes Comments Chronic tension-type headache, not intractable    -  Primary ICD-10-CM: E74.083 ICD-9-CM: 339.12 Vitals BP Pulse Height(growth percentile) Weight(growth percentile) SpO2 BMI  
 130/64 90 5' 3\" (1.6 m) 131 lb (59.4 kg) 98% 23.21 kg/m2 OB Status Smoking Status Postmenopausal Never Smoker BMI and BSA Data Body Mass Index Body Surface Area  
 23.21 kg/m 2 1.62 m 2 Preferred Pharmacy Pharmacy Name Phone [email or web address] Daubs  [place] [phone] Your Updated Medication List  
  
   
This list is accurate as of 5/25/18 11:45 AM.  Always use your most recent med list.  
  
  
  
  
 ACCU-CHEK ALIRIO PLUS TEST STRP strip Generic drug:  glucose blood VI test strips USE TO CHECK BLOOD SUGAR TWICE DAILY  
  
 amitriptyline 10 mg tablet Commonly known as:  ELAVIL TAKE 1 TABLET BY MOUTH EVERY NIGHT  
  
 glipiZIDE SR 5 mg CR tablet Commonly known as:  GLUCOTROL XL  
TAKE 1 TABLET BY MOUTH DAILY  
  
 levothyroxine 50 mcg tablet Commonly known as:  SYNTHROID  
TAKE 1 TABLET BY MOUTH DAILY BEFORE BREAKFAST Patient Instructions A Healthy Lifestyle: Care Instructions Your Care Instructions A healthy lifestyle can help you feel good, stay at a healthy weight, and have plenty of energy for both work and play. A healthy lifestyle is something you can share with your whole family. A healthy lifestyle also can lower your risk for serious health problems, such as high blood pressure, heart disease, and diabetes. You can follow a few steps listed below to improve your health and the health of your family. Follow-up care is a key part of your treatment and safety. Be sure to make and go to all appointments, and call your doctor if you are having problems. It's also a good idea to know your test results and keep a list of the medicines you take. How can you care for yourself at home? · Do not eat too much sugar, fat, or fast foods.  You can still have dessert and treats now and then. The goal is moderation. · Start small to improve your eating habits. Pay attention to portion sizes, drink less juice and soda pop, and eat more fruits and vegetables. ¨ Eat a healthy amount of food. A 3-ounce serving of meat, for example, is about the size of a deck of cards. Fill the rest of your plate with vegetables and whole grains. ¨ Limit the amount of soda and sports drinks you have every day. Drink more water when you are thirsty. ¨ Eat at least 5 servings of fruits and vegetables every day. It may seem like a lot, but it is not hard to reach this goal. A serving or helping is 1 piece of fruit, 1 cup of vegetables, or 2 cups of leafy, raw vegetables. Have an apple or some carrot sticks as an afternoon snack instead of a candy bar. Try to have fruits and/or vegetables at every meal. 
· Make exercise part of your daily routine. You may want to start with simple activities, such as walking, bicycling, or slow swimming. Try to be active 30 to 60 minutes every day. You do not need to do all 30 to 60 minutes all at once. For example, you can exercise 3 times a day for 10 or 20 minutes. Moderate exercise is safe for most people, but it is always a good idea to talk to your doctor before starting an exercise program. 
· Keep moving. Wyona Odonnell the lawn, work in the garden, or [employer]. Take the stairs instead of the elevator at work. · If you smoke, quit. People who smoke have an increased risk for heart attack, stroke, cancer, and other lung illnesses. Quitting is hard, but there are ways to boost your chance of quitting tobacco for good. ¨ Use nicotine gum, patches, or lozenges. ¨ Ask your doctor about stop-smoking programs and medicines. ¨ Keep trying.  
In addition to reducing your risk of diseases in the future, you will notice some benefits soon after you stop using tobacco. If you have shortness of breath or asthma symptoms, they will likely get better within a few weeks after you quit. · Limit how much alcohol you drink. Moderate amounts of alcohol (up to 2 drinks a day for men, 1 drink a day for women) are okay. But drinking too much can lead to liver problems, high blood pressure, and other health problems. Family health If you have a family, there are many things you can do together to improve your health. · Eat meals together as a family as often as possible. · Eat healthy foods. This includes fruits, vegetables, lean meats and dairy, and whole grains. · Include your family in your fitness plan. Most people think of activities such as jogging or tennis as the way to fitness, but there are many ways you and your family can be more active. Anything that makes you breathe hard and gets your heart pumping is exercise. Here are some tips: 
¨ Walk to do errands or to take your child to school or the bus. ¨ Go for a family bike ride after dinner instead of watching TV. Where can you learn more? Go to http://alejandrina-vee.info/. Enter V565 in the search box to learn more about \"A Healthy Lifestyle: Care Instructions. \" Current as of: May 12, 2017 Content Version: 11.4 © 7408-3238 [employer]. Care instructions adapted under license by [employer] (which disclaims liability or warranty for this information). If you have questions about a medical condition or this instruction, always ask your healthcare professional. [place] [address] any warranty or liability for your use of this information. Introducing [facility]! [employer] introduces Fancred patient portal. Now you can access parts of your medical record, email your doctor's office, and request medication refills online. 1. In your internet browser, go to [email or web address] 2. Click on the First Time User? Click Here link in the Sign In box. You will see the New Member Sign Up page. 3. Enter your Leftronic Access Code exactly as it appears below. You will not need to use this code after youve completed the sign-up process. If you do not sign up before the expiration date, you must request a new code. · Leftronic Access Code: [email or web address] Expires: 8/23/2018 11:10 AM 
 
4. Enter the last four digits of your Social Security Number (xxxx) and Date of Birth (mm/dd/yyyy) as indicated and click Submit. You will be taken to the next sign-up page. 5. Create a Leftronic ID. This will be your Leftronic login ID and cannot be changed, so think of one that is secure and easy to remember. 6. Create a Leftronic password. You can change your password at any time. 7. Enter your Password Reset Question and Answer. This can be used at a later time if you forget your password. 8. Enter your e-mail address. You will receive e-mail notification when new information is available in [address] 9. Click Sign Up. You can now view and download portions of your medical record. 10. Click the Download Summary menu link to download a portable copy of your medical information. If you have questions, please visit the Frequently Asked Questions section of the Leftronic website. Remember, Leftronic is NOT to be used for urgent needs. For medical emergencies, dial 911. Now available from your iPhone and Android! Please provide this summary of care documentation to your next provider. Your primary care clinician is listed as Cain Dobbins. If you have any questions after today's visit, please call [phone].

## 2018-05-25 NOTE — PATIENT INSTRUCTIONS

## 2018-05-25 NOTE — PROGRESS NOTES
Neurology Note      05/25/18    Chief Complaint   Patient presents with    Follow-up     headaches better       HPI/Subjective  Eli Randall is a 76 y.o. female who presented to the neurology office for management of headaches that has been going on for the since 2017. Regarding the headaches, the headaches are daily and it is frontal and occipital in location. There is no associated nausea, vomiting, photophobia or phonophobia. She does have the pain and describes it as 10 out of 10 in severity. She does also have flashes of light in front of both of her eyes. It gets worse when she closes her eyes. The headaches and aching and dull in character. She has not had any imaging of the brain. She has been referred here for further evaluation. Baseline headache frequency: Daily  Headache frequency now: Significant improvement. Current Outpatient Prescriptions   Medication Sig    amitriptyline (ELAVIL) 10 mg tablet TAKE 1 TABLET BY MOUTH EVERY NIGHT    levothyroxine (SYNTHROID) 50 mcg tablet TAKE 1 TABLET BY MOUTH DAILY BEFORE BREAKFAST    glipiZIDE SR (GLUCOTROL XL) 5 mg CR tablet TAKE 1 TABLET BY MOUTH DAILY    ACCU-CHEK ALIRIO PLUS TEST STRP strip USE TO CHECK BLOOD SUGAR TWICE DAILY     No current facility-administered medications for this visit.       No Known Allergies  Past Medical History:   Diagnosis Date    Cancer ([address]     skin    Diabetes ([facility]     Hypertension     Hypothyroidism 1993     Past Surgical History:   Procedure Laterality Date    HX ORTHOPAEDIC      rotator cuff repair- left    HX PARTIAL THYROIDECTOMY  1993     Family History   Problem Relation Age of Onset    No Known Problems Mother     Diabetes Father     Diabetes Sister     Cancer Brother      Prostate    Hypertension Brother     Diabetes Maternal Aunt      Social History   Substance Use Topics    Smoking status: Never Smoker    Smokeless tobacco: Never Used    Alcohol use No       REVIEW OF SYSTEMS:   A ten system review of constitutional, cardiovascular, respiratory, musculoskeletal, endocrine, skin, SHEENT, genitourinary, psychiatric and neurologic systems was obtained and is unremarkable with the exception of headaches      EXAMINATION:   Visit Vitals    /64    Pulse 90    Ht 5' 3\" (1.6 m)    Wt 131 lb (59.4 kg)    SpO2 98%    BMI 23.21 kg/m2        General:   General appearance: Pt is in no acute distress   Distal pulses are preserved    Neurological Examination:   Mental Status: AAO x3. Speech is fluent. Follows commands, has normal fund of knowledge, attention, short term recall, comprehension and insight. Cranial Nerves: Visual fields are full. PERRL, Extraocular movements are full. Facial sensation intact. Facial movement intact. Hearing intact to conversation. Palate elevates symmetrically. Shoulder shrug symmetric. Tongue midline. Motor: Strength is 5/5 in all 4 ext. No atrophy. Tone: Normal    Sensation: Normal to light touch    Coordination/Cerebellar: Intact to finger-nose-finger     Gait: Casual gait is normal.     Skin: No significant bruising or lacerations. Laboratory review:   Results for orders placed or performed in visit on 02/14/18   HEPATITIS C AB   Result Value Ref Range    Hep C Virus Ab <0.1 0.0 - 0.9 s/co ratio       Imaging review:  MRI brain  Normal    Documentation review:  None    Assessment/Plan:   Juan Miguel Cha is a 76 y.o. female who presented to the neurology office for management of chronic daily headaches. The patient was started on amitriptyline 10 mg p.o. nightly and there has been significant improvement in the headaches. Imaging study of the brain has been normal.  Will continue the same. Follow-up in 6 months     Over 25 minutes was spent with the patient of which > 50% of the visit was spent counseling on diagnosis, management, and treatment of the diagnosis.   I did discuss about tension type headache, medications and the side effects. PHQ over the last two weeks 5/25/2018   Little interest or pleasure in doing things Not at all   Feeling down, depressed or hopeless Not at all   Total Score PHQ 2 0     Primary care to address possible depression if PHQ-9 score is more than 9. ICD-10-CM ICD-9-CM    1. Chronic tension-type headache, not intractable G44.229 339.12       Thank you for allowing me to participate in the care of Ms. Chun. Please feel free to contact me if you have any questions. Brenda Dsouza MD  Neurologist    CC: Mckenzie Mejia MD  Fax: [phone]    This note was created using voice recognition software. Despite editing, there may be syntax errors.

## 2018-08-20 ENCOUNTER — OFFICE VISIT (OUTPATIENT)
Dept: INTERNAL MEDICINE CLINIC | Age: 78
End: 2018-08-20

## 2018-08-20 VITALS
HEART RATE: 110 BPM | HEIGHT: 63 IN | TEMPERATURE: 97.6 F | OXYGEN SATURATION: 95 % | WEIGHT: 119.4 LBS | DIASTOLIC BLOOD PRESSURE: 67 MMHG | SYSTOLIC BLOOD PRESSURE: 129 MMHG | RESPIRATION RATE: 16 BRPM | BODY MASS INDEX: 21.16 KG/M2

## 2018-08-20 DIAGNOSIS — I10 HYPERTENSION, ESSENTIAL, BENIGN: ICD-10-CM

## 2018-08-20 DIAGNOSIS — E03.9 ACQUIRED HYPOTHYROIDISM: ICD-10-CM

## 2018-08-20 DIAGNOSIS — E11.9 TYPE 2 DIABETES MELLITUS WITHOUT COMPLICATION, WITHOUT LONG-TERM CURRENT USE OF INSULIN (HCC): Primary | ICD-10-CM

## 2018-08-20 DIAGNOSIS — R63.4 UNINTENDED WEIGHT LOSS: ICD-10-CM

## 2018-08-20 NOTE — MR AVS SNAPSHOT
[address] [facility] 
[phone] Patient: Adriana Mejia MRN: [id number] :1940 Visit Information Date & Time Provider Department Dept. Phone Encounter #  
 2018  2:00 PM Arely Cruz, [address] [phone] [id number] Follow-up Instructions Return in about 3 months (around 2018) for follow up diabetes. Your Appointments 2018 11:50 AM  
Follow Up with Heather Doe MD  
[facility]) Appt Note: f/u Diabetes & Thyroid; f/u Diabetes & Thyroid One [facility] [address] [phone] [address] [place] [address]  
  
    
 2018 10:40 AM  
Follow Up with Trenton Rosas MD  
Neurology Clinic at [facility]) Appt Note: [address], 
[address], [address] [address]  
[address], [address], [address] [place] [address] St [address] [address] Upcoming Health Maintenance Date Due Bone Densitometry (Dexa) Screening 10/31/2003 MICROALBUMIN Q1 2018 HEMOGLOBIN A1C Q6M 3/11/2018 LIPID PANEL Q1 2018 Influenza Age 5 to Adult 2018 FOOT EXAM Q1 2018 EYE EXAM RETINAL OR DILATED Q1 10/18/2018 Pneumococcal 65+ Low/Medium Risk (2 of 2 - PPSV23) 2019 MEDICARE YEARLY EXAM 2/15/2019 GLAUCOMA SCREENING Q2Y 10/18/2019 DTaP/Tdap/Td series (2 - Td) 2028 Allergies as of 2018  Review Complete On: 2018 By: Arely Cruz MD  
 No Known Allergies Current Immunizations  Never Reviewed No immunizations on file. Not reviewed this visit You Were Diagnosed With   
  
 Codes Comments  Type 2 diabetes mellitus without complication, without long-term current use of insulin ([facility]    -  Primary ICD-10-CM: E11.9 ICD-9-CM: 250.00 Hypertension, essential, benign     ICD-10-CM: I10 
ICD-9-CM: 401.1 Unintended weight loss     ICD-10-CM: R63.4 ICD-9-CM: 783.21 Acquired hypothyroidism     ICD-10-CM: E03.9 ICD-9-CM: 366. 9 Vitals BP Pulse Temp Resp Height(growth percentile) Weight(growth percentile) 129/67 (BP 1 Location: Left arm, BP Patient Position: Sitting) (!) 110 97.6 °F (36.4 °C) (Oral) 16 5' 3\" (1.6 m) 119 lb 6.4 oz (54.2 kg) SpO2 BMI OB Status Smoking Status 95% 21.15 kg/m2 Postmenopausal Never Smoker Vitals History BMI and BSA Data Body Mass Index Body Surface Area  
 21.15 kg/m 2 1.55 m 2 Preferred Pharmacy Pharmacy Name Phone [facility] [address] [facility] [address]  [place] [phone] Your Updated Medication List  
  
   
This list is accurate as of 8/20/18  2:46 PM.  Always use your most recent med list.  
  
  
  
  
 * ACCU-CHEK ALIRIO PLUS TEST STRP strip Generic drug:  glucose blood VI test strips USE TO CHECK BLOOD SUGAR TWICE DAILY * ACCU-CHEK ALIRIO PLUS TEST STRP strip Generic drug:  glucose blood VI test strips USE TO CHECK BLOOD SUGAR TWICE DAILY  
  
 amitriptyline 10 mg tablet Commonly known as:  ELAVIL TAKE 1 TABLET BY MOUTH EVERY NIGHT  
  
 glipiZIDE SR 5 mg CR tablet Commonly known as:  GLUCOTROL XL  
TAKE 1 TABLET BY MOUTH DAILY  
  
 levothyroxine 50 mcg tablet Commonly known as:  SYNTHROID  
TAKE 1 TABLET BY MOUTH DAILY BEFORE BREAKFAST * Notice: This list has 2 medication(s) that are the same as other medications prescribed for you. Read the directions carefully, and ask your doctor or other care provider to review them with you. We Performed the Following CBC WITH AUTOMATED DIFF [15835 CPT(R)] HEMOGLOBIN A1C WITH EAG [18034 CPT(R)] LIPID PANEL [26750 CPT(R)] METABOLIC PANEL, COMPREHENSIVE [13067 CPT(R)] MICROALBUMIN, UR, RAND W/ MICROALB/CREAT RATIO [id number] CPT(R)] T4, FREE [id number] CPT(R)] TSH 3RD GENERATION [73057 CPT(R)] Follow-up Instructions Return in about 3 months (around 11/20/2018) for follow up diabetes. Introducing [facility]! [employer] introduces Zoomdata patient portal. Now you can access parts of your medical record, email your doctor's office, and request medication refills online. 1. In your internet browser, go to [email or web address] 2. Click on the First Time User? Click Here link in the Sign In box. You will see the New Member Sign Up page. 3. Enter your Zoomdata Access Code exactly as it appears below. You will not need to use this code after youve completed the sign-up process. If you do not sign up before the expiration date, you must request a new code. · Zoomdata Access Code: [email or web address] Expires: 8/23/2018 11:10 AM 
 
4. Enter the last four digits of your Social Security Number (xxxx) and Date of Birth (mm/dd/yyyy) as indicated and click Submit. You will be taken to the next sign-up page. 5. Create a Zoomdata ID. This will be your Zoomdata login ID and cannot be changed, so think of one that is secure and easy to remember. 6. Create a Zoomdata password. You can change your password at any time. 7. Enter your Password Reset Question and Answer. This can be used at a later time if you forget your password. 8. Enter your e-mail address. You will receive e-mail notification when new information is available in [address] 9. Click Sign Up. You can now view and download portions of your medical record. 10. Click the Download Summary menu link to download a portable copy of your medical information. If you have questions, please visit the Frequently Asked Questions section of the Zoomdata website.  Remember, Zoomdata is NOT to be used for urgent needs. For medical emergencies, dial 911. Now available from your iPhone and Android! Please provide this summary of care documentation to your next provider. Your primary care clinician is listed as Aracelis Vitale. If you have any questions after today's visit, please call [phone].

## 2018-08-20 NOTE — PROGRESS NOTES
Reviewed record in preparation for visit and have obtained necessary documentation. Identified pt with two pt identifiers(name and ). Chief Complaint   Patient presents with    Thyroid Problem       Health Maintenance Due   Topic Date Due    Bone Mineral Density   10/31/2003    Albumin Urine Test  2018    Hemoglobin A1C    2018    Cholesterol Test   2018    Flu Vaccine  2018    Diabetic Foot Care  2018       Ms. Modesta Alonzo has a reminder for a \"due or due soon\" health maintenance. I have asked that she discuss this further with her primary care provider for follow-up on this health maintenance. Coordination of Care Questionnaire:  :     1) Have you been to an emergency room, urgent care clinic since your last visit? No      Hospitalized since your last visit? no             2) Have you seen or consulted any other health care providers outside of [address] [place] [address] since your last visit? no  (Include any pap smears or colon screenings in this section.)    3) In the event something were to happen to you and you were unable to speak on your behalf, do you have an Advance Directive/ Living Will in place stating your wishes? NO    Do you have an Advance Directive on file? no    4) Are you interested in receiving information on Advance Directives? YES    Patient is accompanied by self I have received verbal consent from Gracie Brush to discuss any/all medical information while they are present in the room.

## 2018-08-21 LAB
ALBUMIN SERPL-MCNC: 4.1 G/DL (ref 3.5–4.8)
ALBUMIN/CREAT UR: 11.2 MG/G CREAT (ref 0–30)
ALBUMIN/GLOB SERPL: 1.4 {RATIO} (ref 1.2–2.2)
ALP SERPL-CCNC: 63 IU/L (ref 39–117)
ALT SERPL-CCNC: 18 IU/L (ref 0–32)
AST SERPL-CCNC: 17 IU/L (ref 0–40)
BASOPHILS # BLD AUTO: 0 X10E3/UL (ref 0–0.2)
BASOPHILS NFR BLD AUTO: 0 %
BILIRUB SERPL-MCNC: <0.2 MG/DL (ref 0–1.2)
BUN SERPL-MCNC: 22 MG/DL (ref 8–27)
BUN/CREAT SERPL: 29 (ref 12–28)
CALCIUM SERPL-MCNC: 9.7 MG/DL (ref 8.7–10.3)
CHLORIDE SERPL-SCNC: 102 MMOL/L (ref 96–106)
CHOLEST SERPL-MCNC: 217 MG/DL (ref 100–199)
CO2 SERPL-SCNC: 23 MMOL/L (ref 20–29)
CREAT SERPL-MCNC: 0.75 MG/DL (ref 0.57–1)
CREAT UR-MCNC: 148.3 MG/DL
EOSINOPHIL # BLD AUTO: 0 X10E3/UL (ref 0–0.4)
EOSINOPHIL NFR BLD AUTO: 0 %
ERYTHROCYTE [DISTWIDTH] IN BLOOD BY AUTOMATED COUNT: 13.8 % (ref 12.3–15.4)
EST. AVERAGE GLUCOSE BLD GHB EST-MCNC: 203 MG/DL
GLOBULIN SER CALC-MCNC: 2.9 G/DL (ref 1.5–4.5)
GLUCOSE SERPL-MCNC: 175 MG/DL (ref 65–99)
HBA1C MFR BLD: 8.7 % (ref 4.8–5.6)
HCT VFR BLD AUTO: 36.8 % (ref 34–46.6)
HDLC SERPL-MCNC: 47 MG/DL
HGB BLD-MCNC: 12.2 G/DL (ref 11.1–15.9)
IMM GRANULOCYTES # BLD: 0 X10E3/UL (ref 0–0.1)
IMM GRANULOCYTES NFR BLD: 0 %
LDLC SERPL CALC-MCNC: 161 MG/DL (ref 0–99)
LYMPHOCYTES # BLD AUTO: 1.7 X10E3/UL (ref 0.7–3.1)
LYMPHOCYTES NFR BLD AUTO: 25 %
MCH RBC QN AUTO: 29.3 PG (ref 26.6–33)
MCHC RBC AUTO-ENTMCNC: 33.2 G/DL (ref 31.5–35.7)
MCV RBC AUTO: 89 FL (ref 79–97)
MICROALBUMIN UR-MCNC: 16.6 UG/ML
MONOCYTES # BLD AUTO: 0.5 X10E3/UL (ref 0.1–0.9)
MONOCYTES NFR BLD AUTO: 7 %
NEUTROPHILS # BLD AUTO: 4.5 X10E3/UL (ref 1.4–7)
NEUTROPHILS NFR BLD AUTO: 68 %
PLATELET # BLD AUTO: 304 X10E3/UL (ref 150–379)
POTASSIUM SERPL-SCNC: 4.3 MMOL/L (ref 3.5–5.2)
PROT SERPL-MCNC: 7 G/DL (ref 6–8.5)
RBC # BLD AUTO: 4.16 X10E6/UL (ref 3.77–5.28)
SODIUM SERPL-SCNC: 140 MMOL/L (ref 134–144)
T4 FREE SERPL-MCNC: 1.64 NG/DL (ref 0.82–1.77)
TRIGL SERPL-MCNC: 44 MG/DL (ref 0–149)
TSH SERPL DL<=0.005 MIU/L-ACNC: 0.18 UIU/ML (ref 0.45–4.5)
VLDLC SERPL CALC-MCNC: 9 MG/DL (ref 5–40)
WBC # BLD AUTO: 6.7 X10E3/UL (ref 3.4–10.8)

## 2018-08-22 NOTE — PROGRESS NOTES
SUBJECTIVE:   Ms. Shanika Nielsen is a 76 y.o. female who is here for follow up of routine medical issues. Pt reports elevated blood glucose readings. Pt reports her blood glucose readings at 150-180, despite being compliant with glucotrol xl. Pt reports she has not notified her endocrinologist of her elevated readings. Her f/u appointment is in 10/18. Pt endorses avoiding sugar and following a heart healthy diet. Pt's current A1c is 6.8. Pt c/o diarrhea and constipation. Pt reports she did not experience constipation prior to starting her amitriptyline. Pt denies depression or changes in memory. Pt denies blood in her stool. Pt reports she is losing weight. Pt reports she eats small portion meals 2x/day. At this time, she is otherwise doing well and has brought no other complaints to my attention today. For a list of the medical issues addressed today, see the assessment and plan below. PMH:   Past Medical History:   Diagnosis Date    Cancer ([facility] [address]     skin    Diabetes ([facility] [address]     Hypertension     Hypothyroidism 1993     PSH:  has a past surgical history that includes hx partial thyroidectomy (1993) and hx orthopaedic. All: has No Known Allergies. MEDS:   Current Outpatient Prescriptions   Medication Sig    ACCU-CHEK ALIRIO PLUS TEST STRP strip USE TO CHECK BLOOD SUGAR TWICE DAILY    amitriptyline (ELAVIL) 10 mg tablet TAKE 1 TABLET BY MOUTH EVERY NIGHT    levothyroxine (SYNTHROID) 50 mcg tablet TAKE 1 TABLET BY MOUTH DAILY BEFORE BREAKFAST    glipiZIDE SR (GLUCOTROL XL) 5 mg CR tablet TAKE 1 TABLET BY MOUTH DAILY    ACCU-CHEK ALIRIO PLUS TEST STRP strip USE TO CHECK BLOOD SUGAR TWICE DAILY     No current facility-administered medications for this visit. FH: family history includes Cancer in her brother; Diabetes in her father, maternal aunt, and sister; Hypertension in her brother; No Known Problems in her mother. SH:  reports that she has never smoked.  She has never used smokeless tobacco. She reports that she does not drink alcohol or use illicit drugs. Review of Systems - History obtained from the patient  General ROS: weight loss no fever, chills, fatigue, body aches  Psychological ROS: no change in anxiety, depression, SI/HI  Ophthalmic ROS: no blurred vision, myopia, double vision  ENT ROS: no dysphagia, otalgia, otorrhea, rhinorrhea, post nasal drip  Respiratory ROS: no cough, shortness of breath, or wheezing  Cardiovascular ROS: no chest pain or dyspnea on exertion  Gastrointestinal ROS: diarrhea, constipation no abdominal pain or black or bloody stools  Genito-Urinary ROS: no frequency, urgency, incontinence, dysuria, hematouria  Musculoskeletal ROS: no arthralagia, myalgia  Neurological ROS: no headaches, dizziness, lightheadedness, tremors, seizures  Dermatological ROS: no rash or lesions    OBJECTIVE:   Vitals:   Visit Vitals    /67 (BP 1 Location: Left arm, BP Patient Position: Sitting)    Pulse (!) 110    Temp 97.6 °F (36.4 °C) (Oral)    Resp 16    Ht 5' 3\" (1.6 m)    Wt 119 lb 6.4 oz (54.2 kg)    SpO2 95%    BMI 21.15 kg/m2      Gen: Pleasant 76 y.o.  female in NAD. HEENT: PERRLA. EOMI. OP moist and pink. Neck: Supple. No LAD. HEART: RRR, No M/G/R.      LUNGS: CTAB No W/R. ABDOMEN: S, NT, ND, BS+. EXTREMITIES: Warm. No C/C/E.    MUSCULOSKELETAL: Normal ROM, muscle strength 5/5 all groups. NEURO: Alert and oriented x 3. Cranial nerves grossly intact. No focal sensory or motor deficits noted. SKIN: Warm. Dry. No rashes or other lesions noted. ASSESSMENT/ PLAN: Diagnoses and all orders for this visit:    1. Type 2 diabetes mellitus without complication, without long-term current use of insulin (HCC)  -     METABOLIC PANEL, COMPREHENSIVE  -     CBC WITH AUTOMATED DIFF  -     HEMOGLOBIN A1C WITH EAG  -     MICROALBUMIN, UR, RAND W/ MICROALB/CREAT RATIO    2.  Hypertension, essential, benign  -     METABOLIC PANEL, COMPREHENSIVE    3. Unintended weight loss    4. Acquired hypothyroidism  -     LIPID PANEL  -     CBC WITH AUTOMATED DIFF  -     T4, FREE  -     TSH 3RD GENERATION        ICD-10-CM ICD-9-CM    1. Type 2 diabetes mellitus without complication, without long-term current use of insulin (HCC) U63.2 995.45 METABOLIC PANEL, COMPREHENSIVE      CBC WITH AUTOMATED DIFF      HEMOGLOBIN A1C WITH EAG      MICROALBUMIN, UR, RAND W/ MICROALB/CREAT RATIO   2. Hypertension, essential, benign D81 218.1 METABOLIC PANEL, COMPREHENSIVE   3. Unintended weight loss R63.4 783.21    4. Acquired hypothyroidism E03.9 244.9 LIPID PANEL      CBC WITH AUTOMATED DIFF      T4, FREE      TSH 3RD GENERATION        1. DM type 2  I advised pt to continue current dose of glucotrol xl, avoid sugars and starches, and to increase exercise when possible. Recheck hemoglobin A1c, CBC, CMP, and microalbumin. 2. Hypertension  I recommended eating a low sodium diet and increasing exercise. Recheck CMP. 3. Unintended weight loss  I recommended pt use glucerna daily to increase her caloric intake and keep a food diary. 4. Acquired hypothyroidism  I recommended pt continue with current dose of levothyroxine. Recheck lipid panel, CBC, T4, and TSH. Follow-up Disposition:  Return in about 3 months (around 11/20/2018) for follow up diabetes. I have reviewed the patient's medications and risks/side effects/benefits were discussed. Diagnosis(-es) explained to patient and questions answered. Literature provided where appropriate.      Written by Chanelle Carolina, as dictated by Teodoro Rivera MD.

## 2018-08-22 NOTE — PROGRESS NOTES
Is she taking her levothyroxine on Sat and Sunday? CMP-Normal electrolyte levels except for an elevation in the glucose level, normal renal and liver function  Lipid panel-Your current lab results reveal a elevated total cholesterol and ldl. Your total cholesterol should be under 200 and your ldl under 100. Work on following a low fat diet and exercise at least three times a week. A1c-Your current hgbA1c is higher than your last level of 8.3. Work on following a diabetic diet and exercise. Recheck this test: hgbA1c  in  3 months. Continue with current  medications.     TSH-low  FT4-normal

## 2018-08-23 ENCOUNTER — TELEPHONE (OUTPATIENT)
Dept: INTERNAL MEDICINE CLINIC | Age: 78
End: 2018-08-23

## 2018-08-23 NOTE — TELEPHONE ENCOUNTER
----- Message from Alison Najera MD sent at 8/21/2018  8:21 PM EDT -----  Is she taking her levothyroxine on Sat and Sunday? CMP-Normal electrolyte levels except for an elevation in the glucose level, normal renal and liver function  Lipid panel-Your current lab results reveal a elevated total cholesterol and ldl. Your total cholesterol should be under 200 and your ldl under 100. Work on following a low fat diet and exercise at least three times a week. A1c-Your current hgbA1c is higher than your last level of 8.3. Work on following a diabetic diet and exercise. Recheck this test: hgbA1c  in  3 months. Continue with current  medications.     TSH-low  FT4-normal

## 2018-08-24 NOTE — TELEPHONE ENCOUNTER
HIPAA verified with  Magdalene Brown and informed Ms Christy Manuel about patient's lab results. Ms Christy Manuel will call back after talking with patient about  Her Thyroid medication.

## 2018-08-29 ENCOUNTER — TELEPHONE (OUTPATIENT)
Dept: INTERNAL MEDICINE CLINIC | Age: 78
End: 2018-08-29

## 2018-08-29 DIAGNOSIS — E11.9 TYPE 2 DIABETES MELLITUS WITHOUT COMPLICATION, WITHOUT LONG-TERM CURRENT USE OF INSULIN (HCC): ICD-10-CM

## 2018-08-29 RX ORDER — GLIPIZIDE 5 MG/1
TABLET, FILM COATED, EXTENDED RELEASE ORAL
Qty: 135 TAB | Refills: 1 | Status: SHIPPED | OUTPATIENT
Start: 2018-08-29 | End: 2018-09-04 | Stop reason: SDUPTHER

## 2018-08-29 NOTE — TELEPHONE ENCOUNTER
----- Message from Qi Coello sent at 8/29/2018  2:14 PM EDT -----  Regarding: Dr. Wicho Goodwin  Pt stated that since she has been taken Glipizide 1 ½ pill a day she runs out by the end of the month. Pt stated that she has 8 pills left, and she is requesting more pills due to taking 1 ½ a day. [facility]. Best contact [phone].       Message copied/pasted from Chantal Middleton

## 2018-08-31 ENCOUNTER — OFFICE VISIT (OUTPATIENT)
Dept: ENDOCRINOLOGY | Age: 78
End: 2018-08-31

## 2018-08-31 VITALS
BODY MASS INDEX: 22.25 KG/M2 | WEIGHT: 125.6 LBS | DIASTOLIC BLOOD PRESSURE: 71 MMHG | SYSTOLIC BLOOD PRESSURE: 132 MMHG | HEIGHT: 63 IN | HEART RATE: 104 BPM

## 2018-08-31 DIAGNOSIS — E03.9 ACQUIRED HYPOTHYROIDISM: ICD-10-CM

## 2018-08-31 DIAGNOSIS — E11.9 TYPE 2 DIABETES MELLITUS WITHOUT COMPLICATION, WITHOUT LONG-TERM CURRENT USE OF INSULIN (HCC): Primary | ICD-10-CM

## 2018-08-31 RX ORDER — LEVOTHYROXINE SODIUM 25 UG/1
TABLET ORAL
Qty: 90 TAB | Refills: 3 | Status: SHIPPED | OUTPATIENT
Start: 2018-08-31 | End: 2019-06-03 | Stop reason: SDUPTHER

## 2018-08-31 RX ORDER — LEVOTHYROXINE SODIUM 25 UG/1
TABLET ORAL
Qty: 30 TAB | Refills: 3 | Status: SHIPPED | OUTPATIENT
Start: 2018-08-31 | End: 2018-08-31 | Stop reason: SDUPTHER

## 2018-08-31 NOTE — PROGRESS NOTES
Chief Complaint Patient presents with  Thyroid Problem  
  pcp and pharmacy verified  Diabetes  
  eye exam UTD Records since last visit reviewed History of Present Illness: Rafaela Lino is a 76 y.o. female here for follow up of diabetes and hypothyroidism. She was diagnosed with diabetes around 2000. At our last visit in September 2017 her A1C was 6.8% on Glipizide ER 5mg daily. She was biochemically hyperthyroid so I instructed her to decrease her LT4 to 50mcg Mon-Fri only. Pt has not been back to see me since September 2017. Her A1C last week was 8.7%. Her TSH was 0.176 with FT4 of 1.64. When these labs came back her PCP Dr. Ricardo Garvin instructed her to follow up with me. She has been taking the LT4 50mcg Mon-Fri only. Pt notes that when she was started on Amitriptyline for her HAs caused her BGs to be higher and caused her to be more constipated. Pt notes her FBGs were in the 150-180's. She decreased her dose from 10mg to 5mg daily and pt notes the FBGs have been in the 100-110 range and her constipation has improved. Pt is still taking Glipizide ER 5mg daily. She eats 3 meals per day. She has breakfast around 9-11AM, this AM she had a sausage, egg and cheese biscuit and V8. She has lunch around 1-2PM, yesterday she had roast chicken, string beans, tomatoes and unsweetened tea. She has dinner around 7PM, last night she had chicken soup with beans, and potatoes and crackers. She has 1/2 a bottle of Glucerna. She stays very busy gardening and yard work. No history of neuropathy, or nephropathy(Urine MA/Cr not elevated in August 2018). Pt had uterine Cancer at the age of 26 and in 200 she was diagnosed with skin cancer. These were treated with surgery. She never received chemotherapy or radiation. Pt is s/p partial thyroidectomy in 1990's for \"a tumor on my thyroid\". There was no evidence of cancer. A significant portion of her history was obtained from her Niece, who accompanied her today. Current Outpatient Prescriptions Medication Sig  levothyroxine (SYNTHROID) 25 mcg tablet TAKE 1 TABLET BY MOUTH DAILY BEFORE BREAKFAST  glipiZIDE SR (GLUCOTROL XL) 5 mg CR tablet TAKE 1.5 TABLET BY MOUTH DAILY  ACCU-CHEK ALIRIO PLUS TEST STRP strip USE TO CHECK BLOOD SUGAR TWICE DAILY  amitriptyline (ELAVIL) 10 mg tablet TAKE 1 TABLET BY MOUTH EVERY NIGHT (Patient taking differently: TAKE 1/2 TABLET BY MOUTH EVERY NIGHT)  ACCU-CHEK ALIRIO PLUS TEST STRP strip USE TO CHECK BLOOD SUGAR TWICE DAILY No current facility-administered medications for this visit. No Known Allergies Review of Systems: - Eyes: no blurry vision or double vision - Cardiovascular: no chest pain - Respiratory: no shortness of breath - Musculoskeletal: no myalgias - Neurological: No HA, or numbness in her extremities Physical Examination: 
Blood pressure 132/71, pulse (!) 104, height 5' 3\" (1.6 m), weight 125 lb 9.6 oz (57 kg). - General: pleasant, no distress, good eye contact  
- Neck: no carotid bruits - Cardiovascular: regular, normal rate, nl s1 and s2, no m/r/g, 2+ DP pulses - Respiratory: clear bilaterally - Integumentary: no edema, no foot ulcers,  
      -     Psychiatric: normal mood and affect Diabetic foot exam:  
 
Left Foot: 
 Visual Exam: normal  
 Pulse DP: 2+ (normal) Filament test: normal sensation Vibratory sensation: normal 
   
Right Foot: 
 Visual Exam: normal  
 Pulse DP: 2+ (normal) Filament test: normal sensation Vibratory sensation: normal 
 
 
 
Data Reviewed:  
Component Latest Ref Rng & Units 8/20/2018 8/20/2018 8/20/2018 8/20/2018  
 
      3:21 PM  3:21 PM  3:21 PM  3:21 PM  
Creatinine, urine Not Estab. mg/dL 148.3 Microalbumin, urine Not Estab. ug/mL 16.6 Microalbumin/Creat. Ratio 
    0.0 - 30.0 mg/g creat 11.2 Hemoglobin A1c, (calculated) 4.8 - 5.6 %  8.7 (H) Estimated average glucose 
    mg/dL  203    
T4, Free 0.82 - 1.77 ng/dL    1.64 TSH 
    0.450 - 4.500 uIU/mL   0.176 (L) Component Latest Ref Rng & Units 8/20/2018 8/20/2018 8/20/2018  
 
      3:21 PM  3:21 PM  3:21 PM  
WBC 
    3.4 - 10.8 x10E3/uL 6.7    
RBC 
    3.77 - 5.28 x10E6/uL 4.16    
HGB 
    11.1 - 15.9 g/dL 12.2 HCT 
    34.0 - 46.6 % 36.8 MCV 
    79 - 97 fL 89 MCH 
    26.6 - 33.0 pg 29.3 MCHC 
    31.5 - 35.7 g/dL 33.2 RDW 
    12.3 - 15.4 % 13.8 PLATELET 
    994 - 160 x10E3/uL 304 NEUTROPHILS Not Estab. % 68 Lymphocytes Not Estab. % 25 MONOCYTES Not Estab. % 7 EOSINOPHILS Not Estab. % 0    
BASOPHILS Not Estab. % 0    
ABS. NEUTROPHILS 
    1.4 - 7.0 x10E3/uL 4.5 Abs Lymphocytes 0.7 - 3.1 x10E3/uL 1.7    
ABS. MONOCYTES 
    0.1 - 0.9 x10E3/uL 0.5    
ABS. EOSINOPHILS 
    0.0 - 0.4 x10E3/uL 0.0    
ABS. BASOPHILS 
    0.0 - 0.2 x10E3/uL 0.0 IMMATURE GRANULOCYTES Not Estab. % 0    
ABS. IMM. GRANS. 0.0 - 0.1 x10E3/uL 0.0 Glucose 65 - 99 mg/dL   175 (H) BUN 
    8 - 27 mg/dL   22 Creatinine 
    0.57 - 1.00 mg/dL   0.75 GFR est non-AA 
    >59 mL/min/1.73   76 GFR est AA 
    >59 mL/min/1.73   88 BUN/Creatinine ratio 12 - 28   29 (H) Sodium 134 - 144 mmol/L   140 Potassium 3.5 - 5.2 mmol/L   4.3 Chloride 96 - 106 mmol/L   102 CO2 
    20 - 29 mmol/L   23 Calcium 8.7 - 10.3 mg/dL   9.7 Protein, total 
    6.0 - 8.5 g/dL   7.0 Albumin 3.5 - 4.8 g/dL   4.1 GLOBULIN, TOTAL 
    1.5 - 4.5 g/dL   2.9 A-G Ratio 1.2 - 2.2   1.4 Bilirubin, total 
    0.0 - 1.2 mg/dL   <0.2 Alk. phosphatase 39 - 117 IU/L   63 AST 
    0 - 40 IU/L   17  
ALT (SGPT) 0 - 32 IU/L   18 Cholesterol, total 
    100 - 199 mg/dL  217 (H) Triglyceride 0 - 149 mg/dL  44 HDL Cholesterol >39 mg/dL  47 VLDL, calculated 5 - 40 mg/dL  9 LDL, calculated 0 - 99 mg/dL  161 (H) Assessment/Plan:  
1) DM > Pt notes since she decreased the Amitriptyline from 10mg to 5mg daily her BGs have improved. I feel continuing the Glipizide XR 5mg daily would be the safest option and have her continue to monitor her BGs. I would worry about causing hyperglycemia, now that her BGs are in a normal range. Pt to continue the Glipizide ER 5mg daily. Pt to check her BGs daily. 2) Hypothyroidism > Will change her LT4 to 25mcg daily and repeat her TFTs before our next visit in November 2018. Pt voices understanding and agreement with the plan. RTC 3 months Follow-up Disposition: 
Return in about 3 months (around 11/30/2018).  
 
Copy sent to: 
Dr. Clif Pichardo

## 2018-09-04 DIAGNOSIS — E11.9 TYPE 2 DIABETES MELLITUS WITHOUT COMPLICATION, WITHOUT LONG-TERM CURRENT USE OF INSULIN (HCC): ICD-10-CM

## 2018-09-04 RX ORDER — GLIPIZIDE 5 MG/1
TABLET, FILM COATED, EXTENDED RELEASE ORAL
Qty: 135 TAB | Refills: 1 | Status: CANCELLED | OUTPATIENT
Start: 2018-09-04

## 2018-09-04 RX ORDER — GLIPIZIDE 5 MG/1
7.5 TABLET ORAL DAILY
Qty: 135 TAB | Refills: 1 | Status: SHIPPED | OUTPATIENT
Start: 2018-09-04 | End: 2019-03-09 | Stop reason: SDUPTHER

## 2018-09-04 NOTE — TELEPHONE ENCOUNTER
Call completed to [address], Rx needs to be changed to glipizide 5 mg tablets in order to fill. Verbal with read back order received.

## 2018-09-04 NOTE — TELEPHONE ENCOUNTER
[facility] states they have been calling for script since 8-29-18. This medication can not be broken in half. #[phone]        #[phone] pt calling and states she has one pill left. She would like a call as to how Dr. Britt Pacheco wants to due this with not being able to break pill in half.

## 2018-09-05 NOTE — TELEPHONE ENCOUNTER
Patient states she needs a call back from Dr. Hutchins Record in reference to her Glipizide prescription & that she was advised that this can't be done in a 1/2 dose. Patient states she's out of medication & states, \"If something happens to her because of her Blood Sugar being up & doesn't have her medication someone will be in trouble\". Please call to discuss & get prescription corrected or advise if needs any changes.  Thank you

## 2018-09-05 NOTE — TELEPHONE ENCOUNTER
Call completed to patient, two identifiers verified. Patient advised glipiZIDE (GLUCOTROL) 5 mg tablet was submitted to her pharmacy yesterday.

## 2018-11-13 ENCOUNTER — DOCUMENTATION ONLY (OUTPATIENT)
Dept: INTERNAL MEDICINE CLINIC | Age: 78
End: 2018-11-13

## 2018-11-19 ENCOUNTER — OFFICE VISIT (OUTPATIENT)
Dept: INTERNAL MEDICINE CLINIC | Age: 78
End: 2018-11-19

## 2018-11-19 VITALS
HEIGHT: 63 IN | DIASTOLIC BLOOD PRESSURE: 72 MMHG | HEART RATE: 105 BPM | OXYGEN SATURATION: 98 % | TEMPERATURE: 98.1 F | BODY MASS INDEX: 20.98 KG/M2 | WEIGHT: 118.4 LBS | SYSTOLIC BLOOD PRESSURE: 138 MMHG | RESPIRATION RATE: 16 BRPM

## 2018-11-19 DIAGNOSIS — E11.9 TYPE 2 DIABETES MELLITUS WITHOUT COMPLICATION, WITHOUT LONG-TERM CURRENT USE OF INSULIN (HCC): ICD-10-CM

## 2018-11-19 DIAGNOSIS — I10 HYPERTENSION, ESSENTIAL, BENIGN: Primary | ICD-10-CM

## 2018-11-19 NOTE — PROGRESS NOTES
SUBJECTIVE:   Ms. Estrella Tilley is a [de-identified] y.o. female who is here for follow up of routine medical issues. Pt is here in the office with her niece. Pt's BP in the office today is 138/72. Pt last saw Dr. Juana Gama on 8/31/18. He reduced her synthroid to 25mcg daily. In 8/2018, pt's T4 was normal and her TSH was low. Pt endorses a good appetite. Pt reports normal bowel habits. Pt has f/u in 12/2018. DM: Pt is compliant in taking glipizide. Patient denies numbness/tingling in extremities, fatigue, dizziness, polydipsia, polyuria, polyphagia, pancreatitis, increased sugar consumption, sore/bleeding gums, blurred vision, or cuts that will not heal. Pt's last A1c was 8.7% in 8/2018. Pt reports in 10/2018 she was treated for a UTI at [facility]. Pt was prescribed bactrim. Pt reports she had glucose in her urine. Pt reports no further problems. Pt reports she was discharged from [facility]. She reports no cardiac problems. PREVENTIVE:  Shingrix: declined  Flu: declined    At this time, she is otherwise doing well and has brought no other complaints to my attention today. For a list of the medical issues addressed today, see the assessment and plan below. PMH:   Past Medical History:   Diagnosis Date    Cancer ([facility] [address]     skin    Diabetes ([facility] [address]     Hypertension     Hypothyroidism 1993     PSH:  has a past surgical history that includes hx partial thyroidectomy (1993) and hx orthopaedic. All: has No Known Allergies. MEDS:   Current Outpatient Medications   Medication Sig    glipiZIDE (GLUCOTROL) 5 mg tablet Take 1.5 Tabs by mouth daily.     levothyroxine (SYNTHROID) 25 mcg tablet TAKE 1 TABLET BY MOUTH DAILY BEFORE BREAKFAST    ACCU-CHEK ALIRIO PLUS TEST STRP strip USE TO CHECK BLOOD SUGAR TWICE DAILY    ACCU-CHEK ALIRIO PLUS TEST STRP strip USE TO CHECK BLOOD SUGAR TWICE DAILY    amitriptyline (ELAVIL) 10 mg tablet TAKE 1 TABLET BY MOUTH EVERY NIGHT (Patient taking differently: TAKE 1/2 TABLET BY MOUTH EVERY NIGHT)     No current facility-administered medications for this visit. FH: family history includes Cancer in her brother; Diabetes in her father, maternal aunt, and sister; Hypertension in her brother; No Known Problems in her mother. SH:  reports that  has never smoked. she has never used smokeless tobacco. She reports that she does not drink alcohol or use drugs. Review of Systems - History obtained from the patient  General ROS: no fever, chills, fatigue, body aches  Psychological ROS: no change in anxiety, depression, SI/HI  Ophthalmic ROS: no blurred vision, myopia, double vision  ENT ROS: no dysphagia, otalgia, otorrhea, rhinorrhea, post nasal drip  Respiratory ROS: no cough, shortness of breath, or wheezing  Cardiovascular ROS: no chest pain or dyspnea on exertion  Gastrointestinal ROS: no abdominal pain, change in bowel habits, or black or bloody stools  Genito-Urinary ROS: no frequency, urgency, incontinence, dysuria, hematouria  Musculoskeletal ROS: no arthralagia, myalgia  Neurological ROS: no headaches, dizziness, lightheadedness, tremors, seizures  Dermatological ROS: no rash or lesions    OBJECTIVE:   Vitals:   Visit Vitals  /72 (BP 1 Location: Left arm, BP Patient Position: Sitting)   Pulse (!) 105   Temp 98.1 °F (36.7 °C) (Oral)   Resp 16   Ht 5' 3\" (1.6 m)   Wt 118 lb 6.4 oz (53.7 kg)   SpO2 98%   BMI 20.97 kg/m²      Gen: Pleasant [de-identified] y.o.  female in NAD. HEENT: PERRLA. EOMI. OP moist and pink. Neck: Supple. No LAD. HEART: RRR, No M/G/R.      LUNGS: CTAB No W/R. EXTREMITIES: Warm. No C/C/E.    MUSCULOSKELETAL: Normal ROM, muscle strength 5/5 all groups. NEURO: Alert and oriented x 3. Cranial nerves grossly intact. No focal sensory or motor deficits noted. SKIN: Warm. Dry. No rashes or other lesions noted. ASSESSMENT/ PLAN: Diagnoses and all orders for this visit:    1. Hypertension, essential, benign    2.  Type 2 diabetes mellitus without complication, without long-term current use of insulin ([facility] [address]        ICD-10-CM ICD-9-CM    1. Hypertension, essential, benign I10 401.1    2. Type 2 diabetes mellitus without complication, without long-term current use of insulin (HCC) E11.9 250.00       1. Hypertension  I recommended eating a low sodium diet and increasing exercise. 2. DM type 2  I advised pt to continue current dose of glucotrol, avoid sugars and starches, and to increase exercise when possible. I recommended pt keep a record of her weight at home 1-2x/month to assess for fluctuations. Follow-up Disposition:  Return in about 6 months (around 5/19/2019) for follow up. I have reviewed the patient's medications and risks/side effects/benefits were discussed. Diagnosis(-es) explained to patient and questions answered. Literature provided where appropriate.      Written by Jordyn Calhoun, as dictated by Juan Ramon Rausch MD.

## 2018-11-30 DIAGNOSIS — E11.9 TYPE 2 DIABETES MELLITUS WITHOUT COMPLICATION, WITHOUT LONG-TERM CURRENT USE OF INSULIN (HCC): ICD-10-CM

## 2018-11-30 DIAGNOSIS — E03.9 ACQUIRED HYPOTHYROIDISM: ICD-10-CM

## 2019-02-27 ENCOUNTER — DOCUMENTATION ONLY (OUTPATIENT)
Dept: FAMILY MEDICINE CLINIC | Age: 79
End: 2019-02-27

## 2019-04-23 DIAGNOSIS — E03.9 ACQUIRED HYPOTHYROIDISM: ICD-10-CM

## 2019-04-23 RX ORDER — LEVOTHYROXINE SODIUM 25 UG/1
TABLET ORAL
Qty: 30 TAB | Refills: 0 | Status: SHIPPED | OUTPATIENT
Start: 2019-04-23 | End: 2019-06-03 | Stop reason: SDUPTHER

## 2019-06-03 ENCOUNTER — OFFICE VISIT (OUTPATIENT)
Dept: INTERNAL MEDICINE CLINIC | Age: 79
End: 2019-06-03

## 2019-06-03 VITALS
HEIGHT: 63 IN | TEMPERATURE: 98.1 F | SYSTOLIC BLOOD PRESSURE: 151 MMHG | DIASTOLIC BLOOD PRESSURE: 67 MMHG | HEART RATE: 94 BPM | BODY MASS INDEX: 22.32 KG/M2 | OXYGEN SATURATION: 97 % | RESPIRATION RATE: 16 BRPM | WEIGHT: 126 LBS

## 2019-06-03 DIAGNOSIS — Z00.00 MEDICARE ANNUAL WELLNESS VISIT, SUBSEQUENT: Primary | ICD-10-CM

## 2019-06-03 DIAGNOSIS — Z13.31 SCREENING FOR DEPRESSION: ICD-10-CM

## 2019-06-03 DIAGNOSIS — Z13.6 SCREENING FOR ISCHEMIC HEART DISEASE: ICD-10-CM

## 2019-06-03 DIAGNOSIS — Z13.39 SCREENING FOR ALCOHOLISM: ICD-10-CM

## 2019-06-03 DIAGNOSIS — E11.9 TYPE 2 DIABETES MELLITUS WITHOUT COMPLICATION, WITHOUT LONG-TERM CURRENT USE OF INSULIN (HCC): ICD-10-CM

## 2019-06-03 DIAGNOSIS — E78.2 MIXED HYPERLIPIDEMIA: ICD-10-CM

## 2019-06-03 DIAGNOSIS — Z23 IMMUNIZATION DUE: ICD-10-CM

## 2019-06-03 DIAGNOSIS — I10 HYPERTENSION, ESSENTIAL, BENIGN: ICD-10-CM

## 2019-06-03 DIAGNOSIS — E03.9 ACQUIRED HYPOTHYROIDISM: ICD-10-CM

## 2019-06-03 RX ORDER — LEVOTHYROXINE SODIUM 25 UG/1
TABLET ORAL
Qty: 90 TAB | Refills: 0 | Status: SHIPPED | OUTPATIENT
Start: 2019-06-03 | End: 2019-09-11 | Stop reason: SDUPTHER

## 2019-06-03 NOTE — PATIENT INSTRUCTIONS
Medicare Wellness Visit, Female     The best way to live healthy is to have a lifestyle where you eat a well-balanced diet, exercise regularly, limit alcohol use, and quit all forms of tobacco/nicotine, if applicable. Regular preventive services are another way to keep healthy. Preventive services (vaccines, screening tests, monitoring & exams) can help personalize your care plan, which helps you manage your own care. Screening tests can find health problems at the earliest stages, when they are easiest to treat. [facility] follows the current, evidence-based guidelines published by the [facility] ([facility]) when recommending preventive services for our patients. Because we follow these guidelines, sometimes recommendations change over time as research supports it. (For example, mammograms used to be recommended annually. Even though Medicare will still pay for an annual mammogram, the newer guidelines recommend a mammogram every two years for women of average risk.)  Of course, you and your doctor may decide to screen more often for some diseases, based on your risk and your health status. Preventive services for you include:  - Medicare offers their members a free annual wellness visit, which is time for you and your primary care provider to discuss and plan for your preventive service needs. Take advantage of this benefit every year!  -All adults over the age of 72 should receive the recommended pneumonia vaccines. Current USPSTF guidelines recommend a series of two vaccines for the best pneumonia protection.   -All adults should have a flu vaccine yearly and a tetanus vaccine every 10 years. All adults age 61 and older should receive a shingles vaccine once in their lifetime.    -A bone mass density test is recommended when a woman turns 65 to screen for osteoporosis. This test is only recommended one time, as a screening.  Some providers will use this same test as a disease monitoring tool if you already have osteoporosis. -All adults age 38-68 who are overweight should have a diabetes screening test once every three years.   -Other screening tests and preventive services for persons with diabetes include: an eye exam to screen for diabetic retinopathy, a kidney function test, a foot exam, and stricter control over your cholesterol.   -Cardiovascular screening for adults with routine risk involves an electrocardiogram (ECG) at intervals determined by your doctor.   -Colorectal cancer screenings should be done for adults age 54-65 with no increased risk factors for colorectal cancer. There are a number of acceptable methods of screening for this type of cancer. Each test has its own benefits and drawbacks. Discuss with your doctor what is most appropriate for you during your annual wellness visit. The different tests include: colonoscopy (considered the best screening method), a fecal occult blood test, a fecal DNA test, and sigmoidoscopy. -Breast cancer screenings are recommended every other year for women of normal risk, age 54-69.  -Cervical cancer screenings for women over age 72 are only recommended with certain risk factors.   -All adults born between [facility] should be screened once for Hepatitis C.      Here is a list of your current Health Maintenance items (your personalized list of preventive services) with a due date:  Health Maintenance Due   Topic Date Due    Shingles Vaccine (1 of 2) 10/31/1988    Bone Mineral Density   10/31/2003    Pneumococcal Vaccine (1 of 2 - PCV13) 10/31/2003    Annual Well Visit  02/15/2019    Hemoglobin A1C    02/20/2019

## 2019-06-03 NOTE — PROGRESS NOTES
SUBJECTIVE:   Ms. Rosebud Buerger is a [de-identified] y.o. female who is here with her friend for follow up of routine medical issues. Pt's BP today in the office was elevated at 151/67. Pt endorses a good appetite. Pt's weight today in the office was 126 lb, up 8 lb x 11/2018. Pt has been using ensure/boost once daily. Pt eats 3 full meals/day. Pt enjoys staying active with her garden. Hypothyroidism: Pt endorses good energy levels. Pt is compliant in taking levothyroxine. Pt's 8/2018 T4 was normal and TSH was low (0.176). Pt last saw Dr. Rohit Davies in 8/2018. DM: Pt follows with Dr. Rohit Davies. Her last visit was 8/2018. Pt is compliant in taking glipizide. Patient denies numbness/tingling in extremities, fatigue, dizziness, polydipsia, polyuria, polyphagia, pancreatitis, increased sugar consumption, sore/bleeding gums, blurred vision, or cuts that will not heal. Last HgA1c was 8.7% in 8/2018. HLD: Pt's 8/2018 lipid panel revealed elevated total cholesterol (217) and LDL (161), but was otherwise normal.    PREVENTIVE:  Mammogram: declined  Dexa: declined  Shingrix: accepted, prescription provided  Eye exam: 2018, Dr. Arabella Hidalgo    At this time, she is otherwise doing well and has brought no other complaints to my attention today. For a list of the medical issues addressed today, see the assessment and plan below. PMH:   Past Medical History:   Diagnosis Date    Cancer ([facility]     skin    Diabetes ([facility]     Hypertension     Hypothyroidism 1993     PSH:  has a past surgical history that includes hx partial thyroidectomy (1993) and hx orthopaedic. All: has No Known Allergies.    MEDS:   Current Outpatient Medications   Medication Sig    levothyroxine (SYNTHROID) 25 mcg tablet TAKE 1 TABLET BY MOUTH DAILY BEFORE BREAKFAST    varicella-zoster recombinant, PF, (SHINGRIX, PF,) 50 mcg/0.5 mL susr injection 0.5mL by IntraMUSCular route once now and then repeat in 2-6 months    glipiZIDE (GLUCOTROL) 5 mg tablet TAKE 1 AND 1/2 TABLETS BY MOUTH DAILY    ACCU-CHEK ALIRIO PLUS TEST STRP strip USE TO CHECK BLOOD SUGAR TWICE DAILY    amitriptyline (ELAVIL) 10 mg tablet TAKE 1 TABLET BY MOUTH EVERY NIGHT (Patient taking differently: TAKE 1/2 TABLET BY MOUTH EVERY NIGHT)     No current facility-administered medications for this visit. FH: family history includes Cancer in her brother; Diabetes in her father, maternal aunt, and sister; Hypertension in her brother; No Known Problems in her mother. SH:  reports that she has never smoked. She has never used smokeless tobacco. She reports that she does not drink alcohol or use drugs. Review of Systems - History obtained from the patient  General ROS: no fever, chills, fatigue, body aches  Psychological ROS: no change in anxiety, depression, SI/HI  Ophthalmic ROS: no blurred vision, myopia, double vision  ENT ROS: no dysphagia, otalgia, otorrhea, rhinorrhea, post nasal drip  Respiratory ROS: no cough, shortness of breath, or wheezing  Cardiovascular ROS: no chest pain or dyspnea on exertion  Gastrointestinal ROS: no abdominal pain, change in bowel habits, or black or bloody stools  Genito-Urinary ROS: no frequency, urgency, incontinence, dysuria, hematuria  Musculoskeletal ROS: no arthralgia, myalgia  Neurological ROS: no headaches, dizziness, lightheadedness, tremors, seizures  Dermatological ROS: no rash or lesions    OBJECTIVE:   Vitals:   Visit Vitals  /67 (BP 1 Location: Left arm, BP Patient Position: Sitting)   Pulse 94   Temp 98.1 °F (36.7 °C) (Oral)   Resp 16   Ht 5' 3\" (1.6 m)   Wt 126 lb (57.2 kg)   SpO2 97%   BMI 22.32 kg/m²      Gen: Pleasant [de-identified] y.o.  female in NAD. HEENT: PERRLA. EOMI. OP moist and pink. Normal oral mucosa. Neck: Supple. No LAD. + thyromegaly (right side)  HEART: RRR, No M/G/R.      LUNGS: CTAB No W/R. ABDOMEN: S, NT, ND, BS+  EXTREMITIES: Warm. No C/C/E.    MUSCULOSKELETAL: Normal ROM, muscle strength 5/5 all groups. NEURO: Alert and oriented x 3. Cranial nerves grossly intact. No focal sensory or motor deficits noted. SKIN: Warm. Dry. No rashes or other lesions noted. ASSESSMENT/ PLAN: Diagnoses and all orders for this visit:    1. Medicare annual wellness visit, subsequent  -     varicella-zoster recombinant, PF, (SHINGRIX, PF,) 50 mcg/0.5 mL susr injection; 0.5mL by IntraMUSCular route once now and then repeat in 2-6 months    2. Acquired hypothyroidism  -     T4, FREE  -     TSH 3RD GENERATION  -     levothyroxine (SYNTHROID) 25 mcg tablet; TAKE 1 TABLET BY MOUTH DAILY BEFORE BREAKFAST    3. Type 2 diabetes mellitus without complication, without long-term current use of insulin (HCC)  -     HEMOGLOBIN A1C WITH EAG    4. Hypertension, essential, benign  -     METABOLIC PANEL, COMPREHENSIVE  -     CBC WITH AUTOMATED DIFF    5. Mixed hyperlipidemia    6. Immunization due  -     varicella-zoster recombinant, PF, (SHINGRIX, PF,) 50 mcg/0.5 mL susr injection; 0.5mL by IntraMUSCular route once now and then repeat in 2-6 months    7. Screening for alcoholism  -     [place] ANNUAL ALCOHOL SCREEN 15 MIN    8. Screening for depression  -     DEPRESSION SCREEN ANNUAL    9. Screening for ischemic heart disease  -     LIPID PANEL        ICD-10-CM ICD-9-CM    1. Medicare annual wellness visit, subsequent Z00.00 V70.0 varicella-zoster recombinant, PF, (SHINGRIX, PF,) 50 mcg/0.5 mL susr injection   2. Acquired hypothyroidism E03.9 244.9 T4, FREE      TSH 3RD GENERATION      levothyroxine (SYNTHROID) 25 mcg tablet   3. Type 2 diabetes mellitus without complication, without long-term current use of insulin (HCC) E11.9 250.00 HEMOGLOBIN A1C WITH EAG   4. Hypertension, essential, benign G81 047.8 METABOLIC PANEL, COMPREHENSIVE      CBC WITH AUTOMATED DIFF   5. Mixed hyperlipidemia E78.2 272.2 CANCELED: LIPID PANEL   6. Immunization due Z23 V05.9 varicella-zoster recombinant, PF, (SHINGRIX, PF,) 50 mcg/0.5 mL susr injection   7.  Screening for alcoholism Z13.39 V79.1 [place] ANNUAL ALCOHOL SCREEN 15 MIN   8. Screening for depression Z13.31 V79.0 [address]   9. Screening for ischemic heart disease Z13.6 V81.0 LIPID PANEL      1. Medicare Annual Wellness Visit  See attached note. Pt was provided a prescription for shingrix to follow up at the pharmacy. 2. Hypothyroidism  I recommended pt continue on current dose of levothyroxine. Recheck T4 and TSH. 3. DM type 2  I advised pt to continue current dose of glipizide, avoid sugars and starches, and to increase exercise when possible. Recheck hemoglobin A1c.     4. Hypertension  I asked pt to monitor her BP at home. I recommended eating a low sodium diet and increasing exercise. Recheck CMP, CBC.     5. Mixed Hyperlipidemia   Lipid panel shows elevated total cholesterol and LDL. I recommended eating a low fat diet and increasing exercise. Recheck lipid panel. 6. Immunization due  Pt was provided a prescription for shingrix to follow up at the pharmacy. 7. Screening for alcoholism    8. Screening for depression    9. Screening for Ischemic Heart Disease  Recheck lipid panel. Follow-up and Dispositions    · Return in about 6 months (around 12/3/2019) for follow up htn. I have reviewed the patient's medications and risks/side effects/benefits were discussed. Diagnosis(-es) explained to patient and questions answered. Literature provided where appropriate.      Written by Jung Dee, as dictated by Herbie Snow MD.

## 2019-06-03 NOTE — PROGRESS NOTES
Identified pt with two pt identifiers(name and ). Reviewed record in preparation for visit and have obtained necessary documentation. Chief Complaint   Patient presents with    Follow-up     w/ lab work         Health Maintenance Due   Topic    Shingrix Vaccine Age 50> (1 of 2)    Bone Densitometry (Dexa) Screening     Pneumococcal 65+ years (1 of 2 - PCV13)    MEDICARE YEARLY EXAM     HEMOGLOBIN A1C Q6M        Coordination of Care Questionnaire:   1) Have you been to an emergency room, urgent care, or hospitalized since your last visit? If yes, where when, and reason for visit? no       2. Have seen or consulted any other health care provider since your last visit? If yes, where when, and reason for visit? NO      3) Do you have an Advanced Directive/ Living Will in place? NO  If yes, do we have a copy on file NO  If no, would you like information NO    Patient is accompanied by self I have received verbal consent from Dottie Pena to discuss any/all medical information while they are present in the room.     Visit Vitals  /67 (BP 1 Location: Left arm, BP Patient Position: Sitting)   Pulse 94   Temp 98.1 °F (36.7 °C) (Oral)   Resp 16   Ht 5' 3\" (1.6 m)   Wt 126 lb (57.2 kg)   SpO2 97%   BMI 22.32 kg/m²

## 2019-06-03 NOTE — PROGRESS NOTES
This is the Subsequent Medicare Annual Wellness Exam, performed 12 months or more after the Initial AWV or the last Subsequent AWV    I have reviewed the patient's medical history in detail and updated the computerized patient record.      History     Past Medical History:   Diagnosis Date    Cancer ([facility] [address]     skin    Diabetes ([facility] [address]     Hypertension     Hypothyroidism 1993      Past Surgical History:   Procedure Laterality Date    HX ORTHOPAEDIC      rotator cuff repair- left    HX PARTIAL THYROIDECTOMY  1993     Current Outpatient Medications   Medication Sig Dispense Refill    levothyroxine (SYNTHROID) 25 mcg tablet TAKE 1 TABLET BY MOUTH DAILY BEFORE BREAKFAST 90 Tab 0    varicella-zoster recombinant, PF, (SHINGRIX, PF,) 50 mcg/0.5 mL susr injection 0.5mL by IntraMUSCular route once now and then repeat in 2-6 months 0.5 mL 1    glipiZIDE (GLUCOTROL) 5 mg tablet TAKE 1 AND 1/2 TABLETS BY MOUTH DAILY 135 Tab 1    ACCU-CHEK ALIRIO PLUS TEST STRP strip USE TO CHECK BLOOD SUGAR TWICE DAILY 100 Strip 0    amitriptyline (ELAVIL) 10 mg tablet TAKE 1 TABLET BY MOUTH EVERY NIGHT (Patient taking differently: TAKE 1/2 TABLET BY MOUTH EVERY NIGHT) 90 Tab 2     No Known Allergies  Family History   Problem Relation Age of Onset    No Known Problems Mother     Diabetes Father     Diabetes Sister     Cancer Brother         Prostate    Hypertension Brother     Diabetes Maternal Aunt      Social History     Tobacco Use    Smoking status: Never Smoker    Smokeless tobacco: Never Used   Substance Use Topics    Alcohol use: No     Patient Active Problem List   Diagnosis Code    Hypertension, essential, benign I10    Type 2 diabetes mellitus without complication ([facility] [address] O57.9    Unintended weight loss R63.4    Mixed hyperlipidemia E78.2    Acquired hypothyroidism E03.9       Depression Risk Factor Screening:     3 most recent PHQ Screens 6/3/2019   Little interest or pleasure in doing things Not at all   Feeling down, depressed, irritable, or hopeless Not at all   Total Score PHQ 2 0     Alcohol Risk Factor Screening: You do not drink alcohol or very rarely. Functional Ability and Level of Safety:   Hearing Loss  Hearing is good. Activities of Daily Living  The home contains: grab bars  Patient does total self care    Fall Risk  Fall Risk Assessment, last 12 mths 6/3/2019   Able to walk? Yes   Fall in past 12 months? No       Abuse Screen  Patient is not abused    Cognitive Screening   Evaluation of Cognitive Function:  Has your family/caregiver stated any concerns about your memory: no  Normal    Patient Care Team   Patient Care Team:  Kasey Fernandes MD as PCP - General (Family Practice)    Assessment/Plan   Education and counseling provided:  Are appropriate based on today's review and evaluation  End-of-Life planning (with patient's consent)  Cardiovascular screening blood test  Screening for glaucoma    Diagnoses and all orders for this visit:    1. Medicare annual wellness visit, subsequent  -     varicella-zoster recombinant, PF, (SHINGRIX, PF,) 50 mcg/0.5 mL susr injection; 0.5mL by IntraMUSCular route once now and then repeat in 2-6 months    2. Acquired hypothyroidism  -     T4, FREE  -     TSH 3RD GENERATION  -     levothyroxine (SYNTHROID) 25 mcg tablet; TAKE 1 TABLET BY MOUTH DAILY BEFORE BREAKFAST    3. Type 2 diabetes mellitus without complication, without long-term current use of insulin (HCC)  -     HEMOGLOBIN A1C WITH EAG    4. Hypertension, essential, benign  -     METABOLIC PANEL, COMPREHENSIVE  -     CBC WITH AUTOMATED DIFF    5. Mixed hyperlipidemia    6. Immunization due  -     varicella-zoster recombinant, PF, (SHINGRIX, PF,) 50 mcg/0.5 mL susr injection; 0.5mL by IntraMUSCular route once now and then repeat in 2-6 months    7. Screening for alcoholism  -     [place] ANNUAL ALCOHOL SCREEN 15 MIN    8. Screening for depression  -     DEPRESSION SCREEN ANNUAL    9.  Screening for ischemic heart disease  -     LIPID PANEL        Health Maintenance Due   Topic Date Due    Shingrix Vaccine Age 49> (1 of 2) 10/31/1988    Bone Densitometry (Dexa) Screening  10/31/2003    Pneumococcal 65+ years (1 of 2 - PCV13) 10/31/2003    MEDICARE YEARLY EXAM  02/15/2019    HEMOGLOBIN A1C Q6M  02/20/2019   PREVENTIVE:  Mammogram: declined    Dexa: declined    Shingrix: accepted, prescription provided    Eye exam: 2018, Dr. Val Hughes    ACP-ACP planning continued today. I explained the purpose of the advance medical directive. Patient expressed interest in reviewing material.  I provided the patient with a \"Your Right to Decide\" booklet, [employer] Kit, and a copy of an Advance Directive.   Patient agrees to providing a copy to the office when available.

## 2019-06-04 LAB
ALBUMIN SERPL-MCNC: 4 G/DL (ref 3.5–4.7)
ALBUMIN/GLOB SERPL: 1.5 {RATIO} (ref 1.2–2.2)
ALP SERPL-CCNC: 68 IU/L (ref 39–117)
ALT SERPL-CCNC: 19 IU/L (ref 0–32)
AST SERPL-CCNC: 18 IU/L (ref 0–40)
BASOPHILS # BLD AUTO: 0 X10E3/UL (ref 0–0.2)
BASOPHILS NFR BLD AUTO: 1 %
BILIRUB SERPL-MCNC: <0.2 MG/DL (ref 0–1.2)
BUN SERPL-MCNC: 23 MG/DL (ref 8–27)
BUN/CREAT SERPL: 38 (ref 12–28)
CALCIUM SERPL-MCNC: 9.4 MG/DL (ref 8.7–10.3)
CHLORIDE SERPL-SCNC: 104 MMOL/L (ref 96–106)
CHOLEST SERPL-MCNC: 214 MG/DL (ref 100–199)
CO2 SERPL-SCNC: 25 MMOL/L (ref 20–29)
CREAT SERPL-MCNC: 0.6 MG/DL (ref 0.57–1)
EOSINOPHIL # BLD AUTO: 0.1 X10E3/UL (ref 0–0.4)
EOSINOPHIL NFR BLD AUTO: 1 %
ERYTHROCYTE [DISTWIDTH] IN BLOOD BY AUTOMATED COUNT: 14.8 % (ref 12.3–15.4)
EST. AVERAGE GLUCOSE BLD GHB EST-MCNC: 160 MG/DL
GLOBULIN SER CALC-MCNC: 2.6 G/DL (ref 1.5–4.5)
GLUCOSE SERPL-MCNC: 150 MG/DL (ref 65–99)
HBA1C MFR BLD: 7.2 % (ref 4.8–5.6)
HCT VFR BLD AUTO: 38.8 % (ref 34–46.6)
HDLC SERPL-MCNC: 63 MG/DL
HGB BLD-MCNC: 12.4 G/DL (ref 11.1–15.9)
IMM GRANULOCYTES # BLD AUTO: 0 X10E3/UL (ref 0–0.1)
IMM GRANULOCYTES NFR BLD AUTO: 0 %
LDLC SERPL CALC-MCNC: 135 MG/DL (ref 0–99)
LYMPHOCYTES # BLD AUTO: 1.8 X10E3/UL (ref 0.7–3.1)
LYMPHOCYTES NFR BLD AUTO: 29 %
MCH RBC QN AUTO: 28.8 PG (ref 26.6–33)
MCHC RBC AUTO-ENTMCNC: 32 G/DL (ref 31.5–35.7)
MCV RBC AUTO: 90 FL (ref 79–97)
MONOCYTES # BLD AUTO: 0.6 X10E3/UL (ref 0.1–0.9)
MONOCYTES NFR BLD AUTO: 9 %
NEUTROPHILS # BLD AUTO: 3.7 X10E3/UL (ref 1.4–7)
NEUTROPHILS NFR BLD AUTO: 60 %
PLATELET # BLD AUTO: 231 X10E3/UL (ref 150–450)
POTASSIUM SERPL-SCNC: 4.4 MMOL/L (ref 3.5–5.2)
PROT SERPL-MCNC: 6.6 G/DL (ref 6–8.5)
RBC # BLD AUTO: 4.31 X10E6/UL (ref 3.77–5.28)
SODIUM SERPL-SCNC: 141 MMOL/L (ref 134–144)
T4 FREE SERPL-MCNC: 1.32 NG/DL (ref 0.82–1.77)
TRIGL SERPL-MCNC: 79 MG/DL (ref 0–149)
TSH SERPL DL<=0.005 MIU/L-ACNC: 0.36 UIU/ML (ref 0.45–4.5)
VLDLC SERPL CALC-MCNC: 16 MG/DL (ref 5–40)
WBC # BLD AUTO: 6.1 X10E3/UL (ref 3.4–10.8)

## 2019-06-11 NOTE — PROGRESS NOTES
CMP-Normal electrolyte levels except for an elevation in the glucose level, normal renal and liver function  Lipids-Your current lab results reveal a elevated total cholesterol and ldl. Your total cholesterol should be under 200 and your ldl under 100. Work on following a low fat diet and exercise at least three times a week. A1c-Your current hgbA1c is lower than your last level of 8.7. Keep up the good work! TSH-improved  low  Ft4-normal  Continue to work on following a diabetic diet and exercise. Recheck this test: hgbA1c  in  3 months. Continue with current  medications.   Thyroid function-

## 2019-09-11 DIAGNOSIS — E03.9 ACQUIRED HYPOTHYROIDISM: ICD-10-CM

## 2019-09-11 RX ORDER — LEVOTHYROXINE SODIUM 25 UG/1
TABLET ORAL
Qty: 90 TAB | Refills: 1 | Status: SHIPPED | OUTPATIENT
Start: 2019-09-11 | End: 2020-04-10

## 2020-04-09 DIAGNOSIS — E03.9 ACQUIRED HYPOTHYROIDISM: ICD-10-CM

## 2020-04-10 RX ORDER — GLIPIZIDE 5 MG/1
TABLET ORAL
Qty: 135 TAB | Refills: 1 | Status: SHIPPED | OUTPATIENT
Start: 2020-04-10 | End: 2020-10-28 | Stop reason: SDUPTHER

## 2020-04-10 RX ORDER — LEVOTHYROXINE SODIUM 25 UG/1
TABLET ORAL
Qty: 90 TAB | Refills: 1 | Status: SHIPPED | OUTPATIENT
Start: 2020-04-10 | End: 2020-10-28 | Stop reason: SDUPTHER

## 2020-06-07 DIAGNOSIS — E11.9 TYPE 2 DIABETES MELLITUS WITHOUT COMPLICATION, WITHOUT LONG-TERM CURRENT USE OF INSULIN (HCC): Primary | ICD-10-CM

## 2020-06-10 ENCOUNTER — TELEPHONE (OUTPATIENT)
Dept: INTERNAL MEDICINE CLINIC | Age: 80
End: 2020-06-10

## 2020-06-10 NOTE — TELEPHONE ENCOUNTER
Message was left for to contact this office  With concerns and question. Patient's phone number  Voice mail was full. Message was left on Rell Daly phone number to contact this office .

## 2020-06-10 NOTE — TELEPHONE ENCOUNTER
----- Message from Mirela Klein sent at 6/10/2020  2:32 PM EDT -----  Regarding: /Telephone  Patient return call    Caller's first and last name and relationship (if not the patient):      Best contact number(s):  [phone]    Whose call is being returned:  Pt not sure    Details to clarify the request:  Pt not sure     Vilma Zheng      Copy/paste envera

## 2020-06-10 NOTE — TELEPHONE ENCOUNTER
----- Message from Brandan Lindsay sent at 6/10/2020  2:55 PM EDT -----  Regarding: Dr. Vincent Gray: [phone]  Patient is returning a missed call.        Copy/paste envera

## 2020-06-15 NOTE — TELEPHONE ENCOUNTER
[facility] [employer] Office Pool   Phone Number: [phone]             Caller (if not patient): n/a   Relationship of caller (if not patient): n/a   Best contact number(s): [phone]   Name of medication and dosage if known: Accu-Chek test strips for blood sugar   Is patient out of this medication (yes/no): yes   Pharmacy name: [address] [place] [address] listed in chart? (yes/no): yes   Pharmacy phone number: n/a   Date of last visit: Monday, June 03, 2019 02:00 PM   Details to clarify the request: She advised that the pharmacy has attempted to get this refilled previously, but that they have not heard anything.      Copy/Paste  ENVERA

## 2020-06-15 NOTE — TELEPHONE ENCOUNTER
PCP: Kadie Kaplan MD    Last appt: 6/3/2019  No future appointments. Requested Prescriptions     Pending Prescriptions Disp Refills    glucose blood VI test strips (Accu-Chek Shaniqua Plus test strp) strip [Pharmacy Med Name: ACCU-CHEK SHANIQUA PLUS STRIPS 100'S] 100 Strip 2     Sig: USE TO CHECK BLOOD SUGAR TWICE DAILY AS DIRECTED.

## 2020-06-15 NOTE — TELEPHONE ENCOUNTER
Pt called back. Two pt identifiers confirmed. Pt offered and accepted virtual appt for Wednesday, June 17, 2020 02:00 PM w/ Dr. Coleridge Angelucci.   Pt verbalized understanding of information discussed w/ no further questions at this time.

## 2020-06-16 RX ORDER — BLOOD SUGAR DIAGNOSTIC
STRIP MISCELLANEOUS
Qty: 100 STRIP | Refills: 2 | Status: SHIPPED | OUTPATIENT
Start: 2020-06-16 | End: 2020-06-17 | Stop reason: SDUPTHER

## 2020-06-17 ENCOUNTER — VIRTUAL VISIT (OUTPATIENT)
Dept: INTERNAL MEDICINE CLINIC | Age: 80
End: 2020-06-17

## 2020-06-17 DIAGNOSIS — Z00.00 MEDICARE ANNUAL WELLNESS VISIT, SUBSEQUENT: Primary | ICD-10-CM

## 2020-06-17 DIAGNOSIS — Z13.31 SCREENING FOR DEPRESSION: ICD-10-CM

## 2020-06-17 DIAGNOSIS — I10 HYPERTENSION, ESSENTIAL, BENIGN: ICD-10-CM

## 2020-06-17 DIAGNOSIS — E03.9 ACQUIRED HYPOTHYROIDISM: ICD-10-CM

## 2020-06-17 DIAGNOSIS — E11.9 TYPE 2 DIABETES MELLITUS WITHOUT COMPLICATION, WITHOUT LONG-TERM CURRENT USE OF INSULIN (HCC): ICD-10-CM

## 2020-06-17 DIAGNOSIS — Z13.39 SCREENING FOR ALCOHOLISM: ICD-10-CM

## 2020-06-17 DIAGNOSIS — Z13.6 SCREENING FOR ISCHEMIC HEART DISEASE: ICD-10-CM

## 2020-06-17 RX ORDER — BLOOD SUGAR DIAGNOSTIC
STRIP MISCELLANEOUS
Qty: 100 STRIP | Refills: 2 | Status: SHIPPED | OUTPATIENT
Start: 2020-06-17 | End: 2021-04-19

## 2020-06-17 NOTE — PROGRESS NOTES
Identified pt with two pt identifiers(name and ). Reviewed record in preparation for visit and have obtained necessary documentation. Chief Complaint   Patient presents with   [address] Visit        There were no vitals taken for this visit. Health Maintenance Due   Topic    Shingrix Vaccine Age 50> (1 of 2)    Bone Densitometry (Dexa) Screening     Pneumococcal 65+ years (1 of 1 - PPSV23)    MICROALBUMIN Q1     Foot Exam Q1     GLAUCOMA SCREENING Q2Y     Eye Exam Retinal or Dilated     Medicare Yearly Exam        Med Reconciliation: Completed    Coordination of Care Questionnaire:  :   1) Have you been to an emergency room, urgent care, or hospitalized since your last visit? If yes, where when, and reason for visit? No       2. Have seen or consulted any other health care provider since your last visit? If yes, where when, and reason for visit? No       3) Do you have an Advanced Directive/ Living Will in place? No  If yes, do we have a copy on file   If no, would you like information       This is an established visit conducted via telemedicine. The patient has been instructed that this meets HIPAA criteria and acknowledges and agrees to this method of visitation.     Loida Ring  20  2:12 PM

## 2020-06-17 NOTE — PROGRESS NOTES
This is the Subsequent Medicare Annual Wellness Exam, performed 12 months or more after the Initial AWV or the last Subsequent AWV    Consent: Derrek Quintero, who was seen by synchronous (real-time) audio only technology, and/or her healthcare decision maker, is aware that this patient-initiated, Telehealth encounter on 6/17/2020 is a billable service. While AWVs are fully covered by Medicare, any services rendered on this date that are not included in an AWV are subject to additional billing, with coverage as determined by her insurance carrier. She is aware that she may receive a bill for any such additional services and has provided verbal consent to proceed: Yes. I have reviewed the patient's medical history in detail and updated the computerized patient record. History     Patient Active Problem List   Diagnosis Code    Hypertension, essential, benign I10    Type 2 diabetes mellitus without complication ([address] W20.5    Unintended weight loss R63.4    Mixed hyperlipidemia E78.2    Acquired hypothyroidism E03.9     Past Medical History:   Diagnosis Date    Cancer ([address]     skin    Diabetes ([address]     Hypertension     Hypothyroidism 1993      Past Surgical History:   Procedure Laterality Date    HX ORTHOPAEDIC      rotator cuff repair- left    HX PARTIAL THYROIDECTOMY  1993     Current Outpatient Medications   Medication Sig Dispense Refill    glucose blood VI test strips (Accu-Chek Shaniqua Plus test strp) strip USE TO CHECK BLOOD SUGAR TWICE DAILY AS DIRECTED.  100 Strip 2    levothyroxine (SYNTHROID) 25 mcg tablet TAKE 1 TABLET BY MOUTH BEFORE BREAKFAST 90 Tab 1    glipiZIDE (GLUCOTROL) 5 mg tablet TAKE 1 AND 1/2 TABLETS BY MOUTH DAILY 135 Tab 1    glucose blood VI test strips (ACCU-CHEK SHANIQUA PLUS TEST STRP) strip USE TO CHECK BLOOD SUGAR TWICE DAILY 150 Strip 0    amitriptyline (ELAVIL) 10 mg tablet TAKE 1 TABLET BY MOUTH EVERY NIGHT (Patient taking differently: TAKE 1/2 TABLET BY MOUTH EVERY NIGHT) 90 Tab 2     No Known Allergies    Family History   Problem Relation Age of Onset    No Known Problems Mother     Diabetes Father     Diabetes Sister     Cancer Brother         Prostate    Hypertension Brother     Diabetes Maternal Aunt      Social History     Tobacco Use    Smoking status: Never Smoker    Smokeless tobacco: Never Used   Substance Use Topics    Alcohol use: No       Depression Risk Factor Screening:     3 most recent PHQ Screens 6/17/2020   Little interest or pleasure in doing things Not at all   Feeling down, depressed, irritable, or hopeless Not at all   Total Score PHQ 2 0       Alcohol Risk Factor Screening:   Do you average 1 drink per night or more than 7 drinks a week:  No    On any one occasion in the past three months have you have had more than 3 drinks containing alcohol:  No      Functional Ability and Level of Safety:   Hearing: The patient needs further evaluation. She is not interested in a referral.     Activities of Daily Living: The home contains: grab bars  Patient needs help with:  transportation     Ambulation: with no difficulty     Fall Risk:  Fall Risk Assessment, last 12 mths 6/17/2020   Able to walk? Yes   Fall in past 12 months? No     Abuse Screen:  Patient is not abused       Cognitive Screening   Has your family/caregiver stated any concerns about your memory: no        Patient Care Team   Patient Care Team:  Jossy George MD as PCP - General (Family Practice)  Jossy George MD as PCP - [facility]an[facility] Provider    Assessment/Plan   Education and counseling provided:  Are appropriate based on today's review and evaluation  End-of-Life planning (with patient's consent)  Cardiovascular screening blood test  Screening for glaucoma    Diagnoses and all orders for this visit:    1. Medicare annual wellness visit, subsequent  -     [place] ANNUAL ALCOHOL SCREEN [facility]    2.  Hypertension, essential, benign  -     METABOLIC PANEL, COMPREHENSIVE  -     CBC WITH AUTOMATED DIFF    3. Type 2 diabetes mellitus without complication, without long-term current use of insulin (HCC)  -     METABOLIC PANEL, COMPREHENSIVE  -     HEMOGLOBIN A1C WITH EAG    4. Acquired hypothyroidism  -     T4, FREE  -     TSH 3RD GENERATION    5. Screening for alcoholism  -     [place] ANNUAL ALCOHOL SCREEN 15 MIN    6. Screening for depression  -     DEPRESSION SCREEN ANNUAL    7. Screening for ischemic heart disease  -     LIPID PANEL        Health Maintenance Due   Topic Date Due    Shingrix Vaccine Age 49> (1 of 2) 10/31/1988    Bone Densitometry (Dexa) Screening  10/31/2003    Pneumococcal 65+ years (1 of 1 - PPSV23) 10/31/2003    MICROALBUMIN Q1  08/20/2019    Foot Exam Q1  08/31/2019    GLAUCOMA SCREENING Q2Y  10/18/2019    Eye Exam Retinal or Dilated  10/18/2019    Medicare Yearly Exam  06/03/2020       PREVENTIVE:  Mammogram: declined  Dexa: declined  Shingrix: accepted, prescription provided  Eye exam: 2018, Dr. Talib Carrera     ACP-ACP planning continued today. I explained the purpose of the advance medical directive. Patient expressed interest in reviewing material.  I provided the patient with a \"Your Right to Decide\" booklet, [employer] Kit, and a copy of an Advance Directive. Patient agrees to providing a copy to the office when available.             Moreno Nathan is a 78 y.o. female who was evaluated by an audio only encounter for concerns as above. Patient identification was verified prior to start of the visit. A caregiver was present when appropriate. Due to this being a TeleHealth encounter (During [facility]- public health emergency), evaluation of the following organ systems was limited: Vitals/Constitutional/EENT/Resp/CV/GI//MS/Neuro/Skin/Heme-Lymph-Imm.   Pursuant to the emergency declaration under the [facility], [address]5 waiver authority and the [employer] and [employer] Appropriations Act, this Virtual Visit was conducted, with patient's (and/or legal guardian's) consent, to reduce the patient's risk of exposure to COVID-19 and provide necessary medical care. Services were provided through a synchronous discussion virtually to substitute for in-person clinic visit. I was at home. The patient was at home.       Dinesh Canales MD

## 2020-06-17 NOTE — PATIENT INSTRUCTIONS
Medicare Wellness Visit, Female The best way to live healthy is to have a lifestyle where you eat a well-balanced diet, exercise regularly, limit alcohol use, and quit all forms of tobacco/nicotine, if applicable. Regular preventive services are another way to keep healthy. Preventive services (vaccines, screening tests, monitoring & exams) can help personalize your care plan, which helps you manage your own care. Screening tests can find health problems at the earliest stages, when they are easiest to treat. [facility] follows the current, evidence-based guidelines published by the [facility] ([facility]) when recommending preventive services for our patients. Because we follow these guidelines, sometimes recommendations change over time as research supports it. (For example, mammograms used to be recommended annually. Even though Medicare will still pay for an annual mammogram, the newer guidelines recommend a mammogram every two years for women of average risk). Of course, you and your doctor may decide to screen more often for some diseases, based on your risk and your co-morbidities (chronic disease you are already diagnosed with). Preventive services for you include: - Medicare offers their members a free annual wellness visit, which is time for you and your primary care provider to discuss and plan for your preventive service needs. Take advantage of this benefit every year! 
-All adults over the age of 72 should receive the recommended pneumonia vaccines. Current USPSTF guidelines recommend a series of two vaccines for the best pneumonia protection.  
-All adults should have a flu vaccine yearly and a tetanus vaccine every 10 years.  
-All adults age 48 and older should receive the shingles vaccines (series of two vaccines). -All adults age 38-68 who are overweight should have a diabetes screening test once every three years. -All adults born between [birth year] and 1967 should be screened once for Hepatitis C. 
-Other screening tests and preventive services for persons with diabetes include: an eye exam to screen for diabetic retinopathy, a kidney function test, a foot exam, and stricter control over your cholesterol.  
-Cardiovascular screening for adults with routine risk involves an electrocardiogram (ECG) at intervals determined by your doctor.  
-Colorectal cancer screenings should be done for adults age 54-65 with no increased risk factors for colorectal cancer. There are a number of acceptable methods of screening for this type of cancer. Each test has its own benefits and drawbacks. Discuss with your doctor what is most appropriate for you during your annual wellness visit. The different tests include: colonoscopy (considered the best screening method), a fecal occult blood test, a fecal DNA test, and sigmoidoscopy. 
 
-A bone mass density test is recommended when a woman turns 65 to screen for osteoporosis. This test is only recommended one time, as a screening. Some providers will use this same test as a disease monitoring tool if you already have osteoporosis. -Breast cancer screenings are recommended every other year for women of normal risk, age 54-69. 
-Cervical cancer screenings for women over age 72 are only recommended with certain risk factors. Here is a list of your current Health Maintenance items (your personalized list of preventive services) with a due date: 
Health Maintenance Due Topic Date Due  Shingles Vaccine (1 of 2) 10/31/1988  Bone Mineral Density   10/31/2003  Pneumococcal Vaccine (1 of 1 - PPSV23) 10/31/2003  Albumin Urine Test  08/20/2019 [facility] Diabetic Foot Care  08/31/2019  Glaucoma Screening   10/18/2019 [address] Exam  10/18/2019 [facility] Annual Well Visit  06/03/2020

## 2020-06-17 NOTE — PROGRESS NOTES
Corina Troncoso is a 78 y.o. female evaluated via audio only technology on 6/17/2020. Consent: She and/or her health care decision maker is aware that she may receive a bill for this audio only encounter, depending on her insurance coverage, and has provided verbal consent to proceed: Yes    I communicated with the patient and/or health care decision maker about the nature and details of the following:  Assessment & Plan:   Diagnoses and all orders for this visit:    1. Hypertension, essential, benign  This patient's hypertension is well controlled on the current medication. Patient will remain on this regimen. Lab orders will be printed and mailed to the patient. Due to hypertension management on the current medication regimen a CMP was ordered to monitor electrolytes, kidney,and liver function.       -     METABOLIC PANEL, COMPREHENSIVE  -     CBC WITH AUTOMATED DIFF    2. Type 2 diabetes mellitus without complication, without long-term current use of insulin ([facility] [address] [address]  This patient's diabetes is stable and well-controlled on current medications. The patient will remain on the current regimen. Lab orders will be printed and mailed to the patient. Hemoglobin A1c was ordered today. Other tests include microalbumin and a CMP. Patient was counseled about diet and exercise and how to incorporate that into her lifestyle to better control diabetes. -     METABOLIC PANEL, COMPREHENSIVE  -     HEMOGLOBIN A1C WITH EAG        -      MICROALBUMIN  3. Acquired hypothyroidism  Patient is thyroid function has been stable current dose of Synthroid in the past  labs. Labs will be mailed to the patient in order to check her current level of function.  -     T4, FREE  -     TSH 3RD GENERATION            Subjective:   Corina Troncoso is a 78 y.o. female who was seen for Annual Wellness Visit; Diabetes; and Hypertension  Refill reports she is doing well. Her glucose levels have been stable around .   She reports that she is working in her yard for exercise and trying to follow a healthy diet. Her blood pressure is around 130/70 on her current medications. She is compliant with taking her Synthroid and denies any problems with the medication. She denies any significant fatigue. Prior to Admission medications    Medication Sig Start Date End Date Taking? Authorizing Provider   glucose blood VI test strips (Accu-Chek Shaniqua Plus test strp) strip USE TO CHECK BLOOD SUGAR TWICE DAILY AS DIRECTED. 6/16/20  Yes Charles Dorsey MD   levothyroxine (SYNTHROID) 25 mcg tablet TAKE 1 TABLET BY MOUTH BEFORE BREAKFAST 4/10/20  Yes Charles Dorsey MD   glipiZIDE (GLUCOTROL) 5 mg tablet TAKE 1 AND 1/2 TABLETS BY MOUTH DAILY 4/10/20  Yes Charles Dorsey MD   glucose blood VI test strips (ACCU-CHEK SHANIQUA PLUS TEST STRP) strip USE TO CHECK BLOOD SUGAR TWICE DAILY 8/12/19  Yes Charles Dorsey MD   amitriptyline (ELAVIL) 10 mg tablet TAKE 1 TABLET BY MOUTH EVERY NIGHT  Patient taking differently: TAKE 1/2 TABLET BY MOUTH EVERY NIGHT 5/2/18  Yes Valentino Blanco, MD     No Known Allergies    Patient Active Problem List    Diagnosis Date Noted    Acquired hypothyroidism 05/30/2017    Mixed hyperlipidemia 04/12/2017    Unintended weight loss 10/18/2016    Hypertension, essential, benign 08/20/2015    Type 2 diabetes mellitus without complication ([facility] [address] 66/86/9514     Past Medical History:   Diagnosis Date    Cancer ([facility] [address]     skin    Diabetes ([facility] [address]     Hypertension     Hypothyroidism 1993     Past Surgical History:   Procedure Laterality Date    HX ORTHOPAEDIC      rotator cuff repair- left    HX PARTIAL THYROIDECTOMY  1993     Social History     Tobacco Use    Smoking status: Never Smoker    Smokeless tobacco: Never Used   Substance Use Topics    Alcohol use: No       ROS  Review of Systems   Constitutional: Negative. HENT: Negative. Eyes: Negative. Respiratory: Negative. Cardiovascular: Negative. Gastrointestinal: Negative. Genitourinary: Negative. Musculoskeletal: Negative. Skin: Negative. Neurological: Negative. Psychiatric/Behavioral: Negative. I affirm this is a Patient-Initiated Episode with a Patient who has not had a related appointment within my department in the past 7 days or scheduled within the next 24 hours.     Total Time: minutes: 11-20 minutes    Note: not billable if this call serves to triage the patient into an appointment for the relevant concern      Conchita Ortiz MD

## 2020-07-01 LAB
ALBUMIN SERPL-MCNC: 4.2 G/DL (ref 3.6–4.6)
ALBUMIN/CREAT UR: 9 MG/G CREAT (ref 0–29)
ALBUMIN/GLOB SERPL: 1.5 {RATIO} (ref 1.2–2.2)
ALP SERPL-CCNC: 57 IU/L (ref 39–117)
ALT SERPL-CCNC: 18 IU/L (ref 0–32)
AST SERPL-CCNC: 17 IU/L (ref 0–40)
BASOPHILS # BLD AUTO: 0 X10E3/UL (ref 0–0.2)
BASOPHILS NFR BLD AUTO: 0 %
BILIRUB SERPL-MCNC: 0.2 MG/DL (ref 0–1.2)
BUN SERPL-MCNC: 22 MG/DL (ref 8–27)
BUN/CREAT SERPL: 36 (ref 12–28)
CALCIUM SERPL-MCNC: 10.1 MG/DL (ref 8.7–10.3)
CHLORIDE SERPL-SCNC: 102 MMOL/L (ref 96–106)
CHOLEST SERPL-MCNC: 243 MG/DL (ref 100–199)
CO2 SERPL-SCNC: 24 MMOL/L (ref 20–29)
CREAT SERPL-MCNC: 0.61 MG/DL (ref 0.57–1)
CREAT UR-MCNC: 141.2 MG/DL
EOSINOPHIL # BLD AUTO: 0 X10E3/UL (ref 0–0.4)
EOSINOPHIL NFR BLD AUTO: 0 %
ERYTHROCYTE [DISTWIDTH] IN BLOOD BY AUTOMATED COUNT: 13.6 % (ref 11.7–15.4)
EST. AVERAGE GLUCOSE BLD GHB EST-MCNC: 189 MG/DL
GLOBULIN SER CALC-MCNC: 2.8 G/DL (ref 1.5–4.5)
GLUCOSE SERPL-MCNC: 154 MG/DL (ref 65–99)
HBA1C MFR BLD: 8.2 % (ref 4.8–5.6)
HCT VFR BLD AUTO: 39.7 % (ref 34–46.6)
HDLC SERPL-MCNC: 58 MG/DL
HGB BLD-MCNC: 12.8 G/DL (ref 11.1–15.9)
IMM GRANULOCYTES # BLD AUTO: 0 X10E3/UL (ref 0–0.1)
IMM GRANULOCYTES NFR BLD AUTO: 0 %
LDLC SERPL CALC-MCNC: 177 MG/DL (ref 0–99)
LYMPHOCYTES # BLD AUTO: 1.4 X10E3/UL (ref 0.7–3.1)
LYMPHOCYTES NFR BLD AUTO: 25 %
MCH RBC QN AUTO: 28.8 PG (ref 26.6–33)
MCHC RBC AUTO-ENTMCNC: 32.2 G/DL (ref 31.5–35.7)
MCV RBC AUTO: 89 FL (ref 79–97)
MICROALBUMIN UR-MCNC: 13 UG/ML
MONOCYTES # BLD AUTO: 0.6 X10E3/UL (ref 0.1–0.9)
MONOCYTES NFR BLD AUTO: 11 %
NEUTROPHILS # BLD AUTO: 3.6 X10E3/UL (ref 1.4–7)
NEUTROPHILS NFR BLD AUTO: 64 %
PLATELET # BLD AUTO: 319 X10E3/UL (ref 150–450)
POTASSIUM SERPL-SCNC: 4.2 MMOL/L (ref 3.5–5.2)
PROT SERPL-MCNC: 7 G/DL (ref 6–8.5)
RBC # BLD AUTO: 4.44 X10E6/UL (ref 3.77–5.28)
SODIUM SERPL-SCNC: 141 MMOL/L (ref 134–144)
T4 FREE SERPL-MCNC: 1.44 NG/DL (ref 0.82–1.77)
TRIGL SERPL-MCNC: 39 MG/DL (ref 0–149)
TSH SERPL DL<=0.005 MIU/L-ACNC: 0.37 UIU/ML (ref 0.45–4.5)
VLDLC SERPL CALC-MCNC: 8 MG/DL (ref 5–40)
WBC # BLD AUTO: 5.6 X10E3/UL (ref 3.4–10.8)

## 2020-07-08 ENCOUNTER — TELEPHONE (OUTPATIENT)
Dept: INTERNAL MEDICINE CLINIC | Age: 80
End: 2020-07-08

## 2020-07-08 NOTE — PROGRESS NOTES
Lipids- Worse  Your current lab results reveal a elevated total cholesterol and ldl. Your total cholesterol should be under 200 and your ldl under 100. Work on following a low fat diet and exercise as tolerated  CMP-Normal electrolyte levels except for an elevation in the glucose level, normal renal and liver function. A1c- Worse  Your current hgbA1c of 8.2 is higher than your last level of 7.2. Work on following a diabetic diet and exercise. Recheck this test: hgbA1c  in  3 months. Continue with current  Medications.     Thyroid - TSH- Low  FT4- Normal

## 2020-10-05 ENCOUNTER — VIRTUAL VISIT (OUTPATIENT)
Dept: INTERNAL MEDICINE CLINIC | Age: 80
End: 2020-10-05
Payer: MEDICARE

## 2020-10-05 DIAGNOSIS — E11.9 TYPE 2 DIABETES MELLITUS WITHOUT COMPLICATION, WITHOUT LONG-TERM CURRENT USE OF INSULIN (HCC): Primary | ICD-10-CM

## 2020-10-05 DIAGNOSIS — I10 HYPERTENSION, ESSENTIAL, BENIGN: ICD-10-CM

## 2020-10-05 DIAGNOSIS — E78.2 MIXED HYPERLIPIDEMIA: ICD-10-CM

## 2020-10-05 DIAGNOSIS — E03.9 ACQUIRED HYPOTHYROIDISM: ICD-10-CM

## 2020-10-05 PROCEDURE — 99212 OFFICE O/P EST SF 10 MIN: CPT | Performed by: FAMILY MEDICINE

## 2020-10-05 NOTE — PROGRESS NOTES
Brandt Barone is a 78 y.o. female who was seen by synchronous (real-time) audio-video technology on 10/5/2020 for Diabetes (follow up ); Hypertension; and Cholesterol Problem        Assessment & Plan:   Diagnoses and all orders for this visit:    1. Type 2 diabetes mellitus without complication, without long-term current use of insulin (HCC)  -     METABOLIC PANEL, COMPREHENSIVE  -     HEMOGLOBIN A1C WITH EAG    2. Hypertension, essential, benign  -     METABOLIC PANEL, COMPREHENSIVE    3. Mixed hyperlipidemia  -     METABOLIC PANEL, COMPREHENSIVE    4. Acquired hypothyroidism  -     T4, FREE  -     TSH 3RD GENERATION    1. DM type 2  Pt's blood sugar seems to be well controlled. I advised pt to continue current dose of glipizide 7.5 mg every day, avoid sugars and starches, and to increase exercise when possible. Recheck hemoglobin A1c and CMP. 2. Essential Hypertension  BP seems to be well controlled. I recommended eating a low sodium diet and increasing exercise. Recheck CMP. 3. Mixed Hyperlipidemia   Lipid panel shows elevated cholesterol. I recommended eating a low fat diet and increasing exercise. Recheck CMP. 4. Hypothyroidism  I recommended pt continue on current dose of levothyroxine. Recheck T4 and TSH. I spent at least 8 minutes on this visit with this established patient. Subjective:     Pt presents virtually today for routine care. HTN: Pt's BP at home this morning was 144/80 with a pulse of 94. Her BP was 135/77 yesterday with a pulse of 101. Patient denies chest pain, LAWRENCE/SOB, edema, headache, visual changes, dizziness, palpitations or syncope. DM: Pt is compliant in taking glipizide 7.5 mg every day. She has kept a log of morning and evening BG's. Her morning readings the past few days have been 104, 129, 136, 128, and 102. Her evening readings were 155, 148, 165, and 141. Patient denies numbness/tingling in extremities, fatigue, dizziness, polydipsia, polyuria, polyphagia, pancreatitis, increased sugar consumption, sore/bleeding gums, blurred vision, or cuts that will not heal. Last HgA1c on 6/30/2020 was 8.2%. She gardens and spends time in her yard for activity. HLD: Patient denies abdominal pain, nausea, vomiting, diarrhea, arthralgias/myalgias due to the medication. Last lipid panel on 6/30/2020 discussed with pt showed elevated total cholesterol (243), nl triglycerides (39), and elevated LDL (177). She takes a multivitamin, Vitamin D, and Vitamin C for supplements. Prior to Admission medications    Medication Sig Start Date End Date Taking? Authorizing Provider   glucose blood VI test strips (Accu-Chek Shaniqua Plus test strp) strip USE TO CHECK BLOOD SUGAR TWICE DAILY AS DIRECTED. 6/17/20  Yes Kaiser Ramirez MD   levothyroxine (SYNTHROID) 25 mcg tablet TAKE 1 TABLET BY MOUTH BEFORE BREAKFAST 4/10/20  Yes Kaiser Ramirez MD   glipiZIDE (GLUCOTROL) 5 mg tablet TAKE 1 AND 1/2 TABLETS BY MOUTH DAILY 4/10/20  Yes Kaiser Ramirez MD   glucose blood VI test strips (ACCU-CHEK SHANIQUA PLUS TEST STRP) strip USE TO CHECK BLOOD SUGAR TWICE DAILY 8/12/19  Yes Kaiser Ramirez MD   amitriptyline (ELAVIL) 10 mg tablet TAKE 1 TABLET BY MOUTH EVERY NIGHT  Patient taking differently: TAKE 1/2 TABLET BY MOUTH EVERY NIGHT 5/2/18   Surendra Rosas MD     Patient Active Problem List    Diagnosis Date Noted    Acquired hypothyroidism 05/30/2017    Mixed hyperlipidemia 04/12/2017    Unintended weight loss 10/18/2016    Hypertension, essential, benign 08/20/2015    Type 2 diabetes mellitus without complication ([facility] 92/16/2381     Current Outpatient Medications   Medication Sig Dispense Refill    glucose blood VI test strips (Accu-Chek Shaniqua Plus test strp) strip USE TO CHECK BLOOD SUGAR TWICE DAILY AS DIRECTED.  100 Strip 2    levothyroxine (SYNTHROID) 25 mcg tablet TAKE 1 TABLET BY MOUTH BEFORE BREAKFAST 90 Tab 1    glipiZIDE (GLUCOTROL) 5 mg tablet TAKE 1 AND 1/2 TABLETS BY MOUTH DAILY 135 Tab 1    glucose blood VI test strips (ACCU-CHEK ALIRIO PLUS TEST STRP) strip USE TO CHECK BLOOD SUGAR TWICE DAILY 150 Strip 0    amitriptyline (ELAVIL) 10 mg tablet TAKE 1 TABLET BY MOUTH EVERY NIGHT (Patient taking differently: TAKE 1/2 TABLET BY MOUTH EVERY NIGHT) 90 Tab 2     Past Medical History:   Diagnosis Date    Cancer ([facility] [address]     skin    Diabetes ([facility] [facility]     Hypertension     Hypothyroidism 1993     Past Surgical History:   Procedure Laterality Date    HX ORTHOPAEDIC      rotator cuff repair- left    HX PARTIAL THYROIDECTOMY  1993     Family History   Problem Relation Age of Onset    No Known Problems Mother     Diabetes Father     Diabetes Sister     Cancer Brother         Prostate    Hypertension Brother     Diabetes Maternal Aunt      Social History     Tobacco Use    Smoking status: Never Smoker    Smokeless tobacco: Never Used   Substance Use Topics    Alcohol use: No       Review of Systems   Respiratory: Negative for shortness of breath. Cardiovascular: Negative for chest pain. Objective:     Patient-Reported Vitals 10/5/2020   Patient-Reported Weight 120lb   Patient-Reported Pulse 94   Patient-Reported Systolic  098   Patient-Reported Diastolic 80        [INSTRUCTIONS:  \"[x]\" Indicates a positive item  \"[]\" Indicates a negative item  -- DELETE ALL ITEMS NOT EXAMINED]    Constitutional: [x] No apparent distress      [] Abnormal -     Mental status: [x] Alert and awake  [x] Oriented to person/place/time [x] Able to follow commands    [] Abnormal -     Pulmonary/Chest: [x] Respiratory effort normal           [] Abnormal -            Psychiatric:       [x] Normal Affect [] Abnormal -        [x] No Hallucinations    Other pertinent observable physical exam findings:-        We discussed the expected course, resolution and complications of the diagnosis(es) in detail. Medication risks, benefits, costs, interactions, and alternatives were discussed as indicated.   I advised her to contact the office if her condition worsens, changes or fails to improve as anticipated. She expressed understanding with the diagnosis(es) and plan. Krysten Martinez, who was evaluated through a patient-initiated, synchronous (real-time) audio-video encounter, and/or her healthcare decision maker, is aware that it is a billable service, with coverage as determined by her insurance carrier. She provided verbal consent to proceed: Yes, and patient identification was verified. It was conducted pursuant to the emergency declaration under the [address] [place] [address], [address] [place] [address] authority and the [employer] and [employer] General Act. A caregiver was present when appropriate. Ability to conduct physical exam was limited. I was in the office. The patient was at home.       Ethel Castro

## 2020-10-27 LAB
ALBUMIN SERPL-MCNC: 4.2 G/DL (ref 3.6–4.6)
ALBUMIN/GLOB SERPL: 1.7 {RATIO} (ref 1.2–2.2)
ALP SERPL-CCNC: 68 IU/L (ref 39–117)
ALT SERPL-CCNC: 19 IU/L (ref 0–32)
AST SERPL-CCNC: 16 IU/L (ref 0–40)
BILIRUB SERPL-MCNC: 0.3 MG/DL (ref 0–1.2)
BUN SERPL-MCNC: 18 MG/DL (ref 8–27)
BUN/CREAT SERPL: 24 (ref 12–28)
CALCIUM SERPL-MCNC: 10.2 MG/DL (ref 8.7–10.3)
CHLORIDE SERPL-SCNC: 99 MMOL/L (ref 96–106)
CO2 SERPL-SCNC: 25 MMOL/L (ref 20–29)
CREAT SERPL-MCNC: 0.75 MG/DL (ref 0.57–1)
EST. AVERAGE GLUCOSE BLD GHB EST-MCNC: 180 MG/DL
GLOBULIN SER CALC-MCNC: 2.5 G/DL (ref 1.5–4.5)
GLUCOSE SERPL-MCNC: 203 MG/DL (ref 65–99)
HBA1C MFR BLD: 7.9 % (ref 4.8–5.6)
POTASSIUM SERPL-SCNC: 4.3 MMOL/L (ref 3.5–5.2)
PROT SERPL-MCNC: 6.7 G/DL (ref 6–8.5)
SODIUM SERPL-SCNC: 140 MMOL/L (ref 134–144)
T4 FREE SERPL-MCNC: 1.58 NG/DL (ref 0.82–1.77)
TSH SERPL DL<=0.005 MIU/L-ACNC: 0.34 UIU/ML (ref 0.45–4.5)

## 2020-10-28 DIAGNOSIS — E03.9 ACQUIRED HYPOTHYROIDISM: ICD-10-CM

## 2020-10-29 RX ORDER — LEVOTHYROXINE SODIUM 25 UG/1
TABLET ORAL
Qty: 90 TAB | Refills: 1 | Status: SHIPPED | OUTPATIENT
Start: 2020-10-29 | End: 2021-04-11

## 2020-10-29 RX ORDER — GLIPIZIDE 5 MG/1
TABLET ORAL
Qty: 135 TAB | Refills: 1 | Status: SHIPPED | OUTPATIENT
Start: 2020-10-29 | End: 2021-04-11

## 2020-11-18 NOTE — PROGRESS NOTES
CMP-Normal electrolyte levels except for an elevation in the glucose level, normal renal and liver function    A1c-Your current hgbA1c is lower than your last level of 8.2. Keep up the good work! Continue to work on following a diabetic diet and exercise. Recheck this test: hgbA1c  in  3 months. Continue with current  Medications. TSH--low  Please advise this patient to take a half tab on Sunday. Repeat TSH and FT4 in one month.

## 2020-11-24 ENCOUNTER — TELEPHONE (OUTPATIENT)
Dept: INTERNAL MEDICINE CLINIC | Age: 80
End: 2020-11-24

## 2020-11-24 DIAGNOSIS — E03.9 ACQUIRED HYPOTHYROIDISM: Primary | ICD-10-CM

## 2020-11-24 NOTE — TELEPHONE ENCOUNTER
Per verbal read back  From . Larry TSH and FT4 originated and sent to patient with lab letter. Unable to leave voice message, mail arnoldo full.

## 2020-11-24 NOTE — TELEPHONE ENCOUNTER
----- Message from [employer] sent at 11/24/2020  8:50 AM EST -----  Regarding: Dr. Eric Elizalde telephone  Caller's first and last name and relationship (if not the patient):  Best contact number(s): [phone]  Whose call is being returned:   Details to clarify the request: Patient missed a call and does not know who it was from or what it was about.      Message from [facility]

## 2020-11-24 NOTE — TELEPHONE ENCOUNTER
Called, spoke with Pt. Two pt identifiers confirmed. Pt given lab results per Dr. Dana Hale.   Pt also informed of repeating thyroid labs in one month and paper work is mailed to her. Pt also told to take 1/2 tablet of levothyroxine only on Sundays. Pt verbalized understanding of information discussed w/ no further questions at this time.

## 2020-11-24 NOTE — TELEPHONE ENCOUNTER
----- Message from Johann Jones MD sent at 11/17/2020 11:42 PM EST -----  CMP-Normal electrolyte levels except for an elevation in the glucose level, normal renal and liver function    A1c-Your current hgbA1c is lower than your last level of 8.2. Keep up the good work! Continue to work on following a diabetic diet and exercise. Recheck this test: hgbA1c  in  3 months. Continue with current  Medications. TSH--low  Please advise this patient to take a half tab on Sunday. Repeat TSH and FT4 in one month.

## 2021-01-26 NOTE — PROGRESS NOTES
I called the pt to schedule an appt. On Feb. 3rd at 2:15 and I faxed the labs to [address] [place] [address] as well as mailed them.

## 2021-02-03 ENCOUNTER — VIRTUAL VISIT (OUTPATIENT)
Dept: INTERNAL MEDICINE CLINIC | Age: 81
End: 2021-02-03
Payer: MEDICARE

## 2021-02-03 DIAGNOSIS — R10.32 LEFT LOWER QUADRANT ABDOMINAL PAIN: ICD-10-CM

## 2021-02-03 DIAGNOSIS — R68.89 ILL FEELING: Primary | ICD-10-CM

## 2021-02-03 PROCEDURE — 99441 PR PHYS/QHP TELEPHONE EVALUATION 5-10 MIN: CPT | Performed by: FAMILY MEDICINE

## 2021-02-03 NOTE — PROGRESS NOTES
Mayela Redding is a 78 y.o. female who was seen by synchronous (real-time) audio-video technology on 2/3/2021 for No chief complaint on file. Assessment & Plan:  
{A/P PLUS DISPO HLEN:09243} {time statement optional (Optional):99242} Subjective:  
 
 
Prior to Admission medications Medication Sig Start Date End Date Taking? Authorizing Provider  
levothyroxine (SYNTHROID) 25 mcg tablet TAKE 1 TABLET BY MOUTH BEFORE BREAKFAST 10/29/20   Cydney Wise MD  
glipiZIDE (GLUCOTROL) 5 mg tablet TAKE 1 AND 1/2 TABLETS BY MOUTH DAILY 10/29/20   Cydney Wise MD  
glucose blood VI test strips (Accu-Chek Shaniqua Plus test strp) strip USE TO CHECK BLOOD SUGAR TWICE DAILY AS DIRECTED. 6/17/20   Cydney Wise MD  
glucose blood VI test strips (ACCU-CHEK SHANIQUA PLUS TEST STRP) strip USE TO CHECK BLOOD SUGAR TWICE DAILY 8/12/19   Cydney Wise MD  
amitriptyline (ELAVIL) 10 mg tablet TAKE 1 TABLET BY MOUTH EVERY NIGHT Patient taking differently: TAKE 1/2 TABLET BY MOUTH EVERY NIGHT 5/2/18   Ibrahima Figueredo MD  
 
{History SmartLink choices - disappears if left unselected (Optional):15501} ROS Objective:  
 
Patient-Reported Vitals 10/5/2020 Patient-Reported Weight 120lb Patient-Reported Pulse 94 Patient-Reported Systolic  276 Patient-Reported Diastolic 80  
  
 
[INSTRUCTIONS:  \"[x]\" Indicates a positive item  \"[]\" Indicates a negative item  -- DELETE ALL ITEMS NOT EXAMINED] Constitutional: [x] Appears well-developed and well-nourished [x] No apparent distress   
  [] Abnormal - Mental status: [x] Alert and awake  [x] Oriented to person/place/time [x] Able to follow commands   
[] Abnormal - Eyes:   EOM    [x]  Normal    [] Abnormal -  
Sclera  [x]  Normal    [] Abnormal - 
        Discharge [x]  None visible   [] Abnormal - HENT: [x] Normocephalic, atraumatic  [] Abnormal -  
[x] Mouth/Throat: Mucous membranes are moist 
 
External Ears [x] Normal  [] Abnormal - 
 
 Neck: [x] No visualized mass [] Abnormal - Pulmonary/Chest: [x] Respiratory effort normal   [x] No visualized signs of difficulty breathing or respiratory distress 
      [] Abnormal - Musculoskeletal:   [x] Normal gait with no signs of ataxia [x] Normal range of motion of neck [] Abnormal -  
 
Neurological:        [x] No Facial Asymmetry (Cranial nerve 7 motor function) (limited exam due to video visit) [x] No gaze palsy  
     [] Abnormal -   
     
Skin:        [x] No significant exanthematous lesions or discoloration noted on facial skin   
     [] Abnormal - Psychiatric:       [x] Normal Affect [] Abnormal -  
     [x] No Hallucinations Other pertinent observable physical exam findings:- 
 
 
 
We discussed the expected course, resolution and complications of the diagnosis(es) in detail. Medication risks, benefits, costs, interactions, and alternatives were discussed as indicated. I advised her to contact the office if her condition worsens, changes or fails to improve as anticipated. She expressed understanding with the diagnosis(es) and plan. Leticia Araiza, who was evaluated through a patient-initiated, synchronous (real-time) audio-video encounter, and/or her healthcare decision maker, is aware that it is a billable service, with coverage as determined by her insurance carrier. She provided verbal consent to proceed: {YES/NO/NA-Consent obtained within past 12 months:49041::\"Yes\"}, and patient identification was verified. It was conducted pursuant to the emergency declaration under the [address] [place] [address] and the [employer] General Act. A caregiver was present when appropriate. Ability to conduct physical exam was limited. I was {location home office other:51035::\"at home\"}. The patient was {location home office other:61287::\"at home\"}.  
 
 
Mary Ellen Leija MD

## 2021-02-03 NOTE — PROGRESS NOTES
Chief Complaint   Patient presents with    Medication Evaluation     she has not done any labs    Bladder Infection     has left side pain, feels aching all over, says it feels like URI       1. Have you been to the ER, urgent care clinic since your last visit? Hospitalized since your last visit? no    2. Have you seen or consulted any other health care providers outside of the [address] [place] [address] since your last visit? Include any pap smears or colon screening.   no

## 2021-02-04 NOTE — PROGRESS NOTES
Sage Le is a 78 y.o. female, evaluated via audio-only technology on 2/3/2021 for Medication Evaluation (she has not done any labs) and Bladder Infection (has left side pain, feels aching all over, says it feels like URI)  . Ms. Jefferson Sapp is a phone call visit to report that she is not feeling well. She reports her symptoms started Sunday night and she felt worse on Monday. She reports feeling stomach pain. She reports going to the bathroom and is concerned that she may have a UTI. She denies any frequency of urination, burning, or odor to her urine. She denies any diarrhea, shortness of breath, chest pain or fever. She reports that her appetite is good and she feels better today but still not herself. She is also due to have her thyroid function level checked. Assessment & Plan:   Diagnoses and all orders for this visit:    1. Ill feeling    2. Left lower quadrant abdominal pain      Due to this patient's ongoing discomfort and the need for acute labs she was advised to go to the urgent care. They will check her vitals and do the lab draw. They will need to order a urinalysis and a urine culture. I also asked her to request thyroid function studies. These results can be faxed to this office. 12  Subjective:       Prior to Admission medications    Medication Sig Start Date End Date Taking?  Authorizing Provider   levothyroxine (SYNTHROID) 25 mcg tablet TAKE 1 TABLET BY MOUTH BEFORE BREAKFAST 10/29/20  Yes Santos Almaguer MD   glipiZIDE (GLUCOTROL) 5 mg tablet TAKE 1 AND 1/2 TABLETS BY MOUTH DAILY 10/29/20  Yes Santos Almaguer MD   glucose blood VI test strips (Accu-Chek Shaniqua Plus test strp) strip USE TO CHECK BLOOD SUGAR TWICE DAILY AS DIRECTED. 6/17/20  Yes Santos Almaguer MD   glucose blood VI test strips (ACCU-CHEK SHANIQUA PLUS TEST STRP) strip USE TO CHECK BLOOD SUGAR TWICE DAILY 8/12/19  Yes Santos Almaguer MD   amitriptyline (ELAVIL) 10 mg tablet TAKE 1 TABLET BY MOUTH EVERY NIGHT  Patient taking differently: TAKE 1/2 TABLET BY MOUTH EVERY NIGHT 5/2/18   Olga Ramesh MD     Patient Active Problem List   Diagnosis Code    Hypertension, essential, benign I10    Type 2 diabetes mellitus without complication ([facility] [address] D31.3    Unintended weight loss R63.4    Mixed hyperlipidemia E78.2    Acquired hypothyroidism E03.9     No Known Allergies  Past Medical History:   Diagnosis Date    Cancer ([facility] [address]     skin    Diabetes ([facility] [address]     Hypertension     Hypothyroidism 1993     Past Surgical History:   Procedure Laterality Date    HX ORTHOPAEDIC      rotator cuff repair- left    HX PARTIAL THYROIDECTOMY  1993       Review of Systems   Constitutional: Positive for malaise/fatigue. HENT: Positive for congestion. Eyes: Negative. Respiratory: Negative. Cardiovascular: Negative. Gastrointestinal: Positive for abdominal pain. Genitourinary: Negative. Musculoskeletal: Negative. Skin: Negative. Neurological: Negative. Psychiatric/Behavioral: Negative. Patient-Reported Vitals 2/3/2021   Patient-Reported Weight 120lb   Patient-Reported Pulse 79   Patient-Reported Systolic  009   Patient-Reported Diastolic [facility], who was evaluated through a patient-initiated, synchronous (real-time) audio only encounter, and/or her healthcare decision maker, is aware that it is a billable service, with coverage as determined by her insurance carrier. She provided verbal consent to proceed: Yes. She has not had a related appointment within my department in the past 7 days or scheduled within the next 24 hours.       Total Time: minutes: 5-10 minutes    Libertad Castellanos MD

## 2021-02-26 LAB
T4 FREE SERPL-MCNC: 1.45 NG/DL (ref 0.82–1.77)
TSH SERPL DL<=0.005 MIU/L-ACNC: 0.27 UIU/ML (ref 0.45–4.5)

## 2021-04-19 DIAGNOSIS — E11.9 TYPE 2 DIABETES MELLITUS WITHOUT COMPLICATION, WITHOUT LONG-TERM CURRENT USE OF INSULIN (HCC): ICD-10-CM

## 2021-04-19 RX ORDER — BLOOD SUGAR DIAGNOSTIC
STRIP MISCELLANEOUS
Qty: 100 STRIP | Refills: 2 | Status: SHIPPED | OUTPATIENT
Start: 2021-04-19 | End: 2021-10-25

## 2021-10-24 DIAGNOSIS — E11.9 TYPE 2 DIABETES MELLITUS WITHOUT COMPLICATION, WITHOUT LONG-TERM CURRENT USE OF INSULIN (HCC): ICD-10-CM

## 2021-10-25 RX ORDER — BLOOD SUGAR DIAGNOSTIC
STRIP MISCELLANEOUS
Qty: 100 STRIP | Refills: 2 | Status: SHIPPED | OUTPATIENT
Start: 2021-10-25

## 2021-11-22 ENCOUNTER — OFFICE VISIT (OUTPATIENT)
Dept: INTERNAL MEDICINE CLINIC | Age: 81
End: 2021-11-22
Payer: MEDICARE

## 2021-11-22 VITALS
WEIGHT: 123 LBS | OXYGEN SATURATION: 100 % | BODY MASS INDEX: 21.79 KG/M2 | HEIGHT: 63 IN | HEART RATE: 98 BPM | TEMPERATURE: 98.1 F | RESPIRATION RATE: 16 BRPM | DIASTOLIC BLOOD PRESSURE: 74 MMHG | SYSTOLIC BLOOD PRESSURE: 136 MMHG

## 2021-11-22 DIAGNOSIS — I10 HYPERTENSION, ESSENTIAL, BENIGN: ICD-10-CM

## 2021-11-22 DIAGNOSIS — Z78.0 POSTMENOPAUSAL STATE: ICD-10-CM

## 2021-11-22 DIAGNOSIS — Z13.6 SCREENING FOR ISCHEMIC HEART DISEASE: ICD-10-CM

## 2021-11-22 DIAGNOSIS — E11.9 TYPE 2 DIABETES MELLITUS WITHOUT COMPLICATION, WITHOUT LONG-TERM CURRENT USE OF INSULIN (HCC): ICD-10-CM

## 2021-11-22 DIAGNOSIS — E03.9 ACQUIRED HYPOTHYROIDISM: ICD-10-CM

## 2021-11-22 DIAGNOSIS — Z00.00 MEDICARE ANNUAL WELLNESS VISIT, SUBSEQUENT: Primary | ICD-10-CM

## 2021-11-22 DIAGNOSIS — E78.2 MIXED HYPERLIPIDEMIA: ICD-10-CM

## 2021-11-22 DIAGNOSIS — Z78.0 POST-MENOPAUSAL: ICD-10-CM

## 2021-11-22 DIAGNOSIS — Z71.89 ADVANCED DIRECTIVES, COUNSELING/DISCUSSION: ICD-10-CM

## 2021-11-22 LAB
ALBUMIN SERPL-MCNC: 4 G/DL (ref 3.5–5)
ALBUMIN/GLOB SERPL: 1.1 {RATIO} (ref 1.1–2.2)
ALP SERPL-CCNC: 72 U/L (ref 45–117)
ALT SERPL-CCNC: 30 U/L (ref 12–78)
ANION GAP SERPL CALC-SCNC: 5 MMOL/L (ref 5–15)
AST SERPL-CCNC: 16 U/L (ref 15–37)
BASOPHILS # BLD: 0 K/UL (ref 0–0.1)
BASOPHILS NFR BLD: 0 % (ref 0–1)
BILIRUB SERPL-MCNC: 0.3 MG/DL (ref 0.2–1)
BUN SERPL-MCNC: 21 MG/DL (ref 6–20)
BUN/CREAT SERPL: 29 (ref 12–20)
CALCIUM SERPL-MCNC: 9.9 MG/DL (ref 8.5–10.1)
CHLORIDE SERPL-SCNC: 106 MMOL/L (ref 97–108)
CHOLEST SERPL-MCNC: 256 MG/DL
CO2 SERPL-SCNC: 27 MMOL/L (ref 21–32)
CREAT SERPL-MCNC: 0.72 MG/DL (ref 0.55–1.02)
DIFFERENTIAL METHOD BLD: NORMAL
EOSINOPHIL # BLD: 0 K/UL (ref 0–0.4)
EOSINOPHIL NFR BLD: 0 % (ref 0–7)
ERYTHROCYTE [DISTWIDTH] IN BLOOD BY AUTOMATED COUNT: 13.8 % (ref 11.5–14.5)
EST. AVERAGE GLUCOSE BLD GHB EST-MCNC: 163 MG/DL
GLOBULIN SER CALC-MCNC: 3.8 G/DL (ref 2–4)
GLUCOSE SERPL-MCNC: 228 MG/DL (ref 65–100)
HBA1C MFR BLD: 7.3 % (ref 4–5.6)
HCT VFR BLD AUTO: 40 % (ref 35–47)
HDLC SERPL-MCNC: 67 MG/DL
HDLC SERPL: 3.8 {RATIO} (ref 0–5)
HGB BLD-MCNC: 13.3 G/DL (ref 11.5–16)
IMM GRANULOCYTES # BLD AUTO: 0 K/UL (ref 0–0.04)
IMM GRANULOCYTES NFR BLD AUTO: 0 % (ref 0–0.5)
LDLC SERPL CALC-MCNC: 181.4 MG/DL (ref 0–100)
LYMPHOCYTES # BLD: 1.3 K/UL (ref 0.8–3.5)
LYMPHOCYTES NFR BLD: 24 % (ref 12–49)
MCH RBC QN AUTO: 29.6 PG (ref 26–34)
MCHC RBC AUTO-ENTMCNC: 33.3 G/DL (ref 30–36.5)
MCV RBC AUTO: 89.1 FL (ref 80–99)
MONOCYTES # BLD: 0.5 K/UL (ref 0–1)
MONOCYTES NFR BLD: 9 % (ref 5–13)
NEUTS SEG # BLD: 3.7 K/UL (ref 1.8–8)
NEUTS SEG NFR BLD: 67 % (ref 32–75)
NRBC # BLD: 0 K/UL (ref 0–0.01)
NRBC BLD-RTO: 0 PER 100 WBC
PLATELET # BLD AUTO: 238 K/UL (ref 150–400)
PMV BLD AUTO: 10.7 FL (ref 8.9–12.9)
POTASSIUM SERPL-SCNC: 4.4 MMOL/L (ref 3.5–5.1)
PROT SERPL-MCNC: 7.8 G/DL (ref 6.4–8.2)
RBC # BLD AUTO: 4.49 M/UL (ref 3.8–5.2)
SODIUM SERPL-SCNC: 138 MMOL/L (ref 136–145)
T4 FREE SERPL-MCNC: 1.2 NG/DL (ref 0.8–1.5)
TRIGL SERPL-MCNC: 38 MG/DL (ref ?–150)
TSH SERPL DL<=0.05 MIU/L-ACNC: 0.35 UIU/ML (ref 0.36–3.74)
VLDLC SERPL CALC-MCNC: 7.6 MG/DL
WBC # BLD AUTO: 5.5 K/UL (ref 3.6–11)

## 2021-11-22 PROCEDURE — G8427 DOCREV CUR MEDS BY ELIG CLIN: HCPCS | Performed by: FAMILY MEDICINE

## 2021-11-22 PROCEDURE — G8754 DIAS BP LESS 90: HCPCS | Performed by: FAMILY MEDICINE

## 2021-11-22 PROCEDURE — G8536 NO DOC ELDER MAL SCRN: HCPCS | Performed by: FAMILY MEDICINE

## 2021-11-22 PROCEDURE — G8510 SCR DEP NEG, NO PLAN REQD: HCPCS | Performed by: FAMILY MEDICINE

## 2021-11-22 PROCEDURE — G8400 PT W/DXA NO RESULTS DOC: HCPCS | Performed by: FAMILY MEDICINE

## 2021-11-22 PROCEDURE — 1101F PT FALLS ASSESS-DOCD LE1/YR: CPT | Performed by: FAMILY MEDICINE

## 2021-11-22 PROCEDURE — G8752 SYS BP LESS 140: HCPCS | Performed by: FAMILY MEDICINE

## 2021-11-22 PROCEDURE — G8420 CALC BMI NORM PARAMETERS: HCPCS | Performed by: FAMILY MEDICINE

## 2021-11-22 PROCEDURE — G0439 PPPS, SUBSEQ VISIT: HCPCS | Performed by: FAMILY MEDICINE

## 2021-11-22 RX ORDER — INSULIN PUMP SYRINGE, 3 ML
EACH MISCELLANEOUS
Qty: 1 KIT | Refills: 1 | Status: SHIPPED | OUTPATIENT
Start: 2021-11-22

## 2021-11-22 RX ORDER — LANCETS
EACH MISCELLANEOUS
Qty: 1 EACH | Refills: 11 | Status: SHIPPED | OUTPATIENT
Start: 2021-11-22

## 2021-11-22 NOTE — PROGRESS NOTES
Identified pt with two pt identifiers(name and ). Reviewed record in preparation for visit and have obtained necessary documentation. Chief Complaint   Patient presents with    Physical        Vitals:    21 1148   BP: 136/74   Pulse: 98   Resp: 16   Temp: 98.1 °F (36.7 °C)   TempSrc: Temporal   SpO2: 100%   Weight: 123 lb (55.8 kg)   Height: 5' 3\" (1.6 m)   PainSc:   0 - No pain       Health Maintenance Due   Topic    COVID-19 Vaccine (1)    Shingrix Vaccine Age 50> (1 of 2)    Bone Densitometry (Dexa) Screening     Pneumococcal 65+ years (1 of 1 - PPSV23)    Foot Exam Q1     Eye Exam Retinal or Dilated     Medicare Yearly Exam     MICROALBUMIN Q1     Flu Vaccine (1)       Coordination of Care Questionnaire:  :   1) Have you been to an emergency room, urgent care, or hospitalized since your last visit? If yes, where when, and reason for visit? no       2. Have seen or consulted any other health care provider since your last visit? If yes, where when, and reason for visit? NO      Patient is accompanied by self I have received verbal consent from Earl Rg to discuss any/all medical information while they are present in the room. An electronic signature was used to authenticate this note.   -- Milagros Teran LPN

## 2021-11-22 NOTE — PROGRESS NOTES
This is the Subsequent Medicare Annual Wellness Exam, performed 12 months or more after the Initial AWV or the last Subsequent AWV    I have reviewed the patient's medical history in detail and updated the computerized patient record. Assessment/Plan   Education and counseling provided:  Are appropriate based on today's review and evaluation  End-of-Life planning (with patient's consent)  Cardiovascular screening blood test  Bone mass measurement (DEXA)  Screening for glaucoma    1. Medicare annual wellness visit, subsequent  -     REFERRAL TO ACP CLINICAL SPECIALIST  2. Hypertension, essential, benign  -     METABOLIC PANEL, COMPREHENSIVE; Future  -     CBC WITH AUTOMATED DIFF; Future  3. Type 2 diabetes mellitus without complication, without long-term current use of insulin (HCC)  -     METABOLIC PANEL, COMPREHENSIVE; Future  -     HEMOGLOBIN A1C WITH EAG; Future  -     Blood-Glucose Meter monitoring kit; Check blood glucose daily. , Normal, Disp-1 Kit, R-1  -     lancets misc; Check blood glucose daily. , Normal, Disp-1 Each, R-11  -     glucose blood VI test strips (ASCENSIA AUTODISC VI, ONE TOUCH ULTRA TEST VI) strip; Check blood glucose daily. , Normal, Disp-100 Strip, R-1  4. Acquired hypothyroidism  -     TSH 3RD GENERATION; Future  -     T4, FREE; Future  -     METABOLIC PANEL, COMPREHENSIVE; Future  5. Mixed hyperlipidemia  -     LIPID PANEL; Future  6. Post-menopausal  -     DEXA BONE DENSITY STUDY AXIAL; Future  7. Advanced directives, counseling/discussion  -     REFERRAL TO ACP CLINICAL SPECIALIST  8. Screening for ischemic heart disease  -     LIPID PANEL; Future  9. Postmenopausal state  -     DEXA BONE DENSITY STUDY AXIAL; Future     Mrs. Rufina Brandon does not have ACP documents in her chart. She was referred to an ACP specialist for assistance and completing these documents.   Depression Risk Factor Screening     3 most recent PHQ Screens 11/22/2021   Little interest or pleasure in doing things Not at all   Feeling down, depressed, irritable, or hopeless Not at all   Total Score PHQ 2 0       Alcohol Risk Screen    Do you average more than 1 drink per night or more than 7 drinks a week:  No    On any one occasion in the past three months have you have had more than 3 drinks containing alcohol:  No        Functional Ability and Level of Safety    Hearing: The patient needs further evaluation. Activities of Daily Living: The home contains: handrails  Patient does total self care      Ambulation: Normal     Fall Risk:  Fall Risk Assessment, last 12 mths 11/22/2021   Able to walk? Yes   Fall in past 12 months? 0   Do you feel unsteady?  0   Are you worried about falling 0      Abuse Screen:  Patient is not abused       Cognitive Screening    Has your family/caregiver stated any concerns about your memory: no         Health Maintenance Due     Health Maintenance Due   Topic Date Due    COVID-19 Vaccine (1) Never done    Shingrix Vaccine Age 50> (1 of 2) Never done    Bone Densitometry (Dexa) Screening  Never done    Pneumococcal 65+ years (1 of 1 - PPSV23) Never done    Foot Exam Q1  08/31/2019    Eye Exam Retinal or Dilated  10/18/2019    MICROALBUMIN Q1  06/30/2021    Flu Vaccine (1) Never done       Patient Care Team   Patient Care Team:  Brian Ontiveros MD as PCP - General (Family Medicine)  Brian Ontiveros MD as PCP - [facility]    History     Patient Active Problem List   Diagnosis Code    Hypertension, essential, benign I10    Type 2 diabetes mellitus without complication ([address] M78.5    Unintended weight loss R63.4    Mixed hyperlipidemia E78.2    Acquired hypothyroidism E03.9     Past Medical History:   Diagnosis Date    Cancer ([address]     skin    Diabetes ([address]     Hypertension     Hypothyroidism 1993      Past Surgical History:   Procedure Laterality Date    HX ORTHOPAEDIC      rotator cuff repair- left    HX PARTIAL THYROIDECTOMY  1993     Current Outpatient Medications Medication Sig Dispense Refill    Blood-Glucose Meter monitoring kit Check blood glucose daily. 1 Kit 1    lancets misc Check blood glucose daily. 1 Each 11    glucose blood VI test strips (ASCENSIA AUTODISC VI, ONE TOUCH ULTRA TEST VI) strip Check blood glucose daily.  100 Strip 1    Accu-Chek Shaniqua Plus test strp strip USE TO CHECK BLOOD SUGAR TWICE DAILY AS DIRECTED 100 Strip 2    levothyroxine (SYNTHROID) 25 mcg tablet TAKE 1 TABLET BY MOUTH BEFORE BREAKFAST 90 Tab 3    glipiZIDE (GLUCOTROL) 5 mg tablet TAKE 1 AND 1/2 TABLETS BY MOUTH DAILY 135 Tab 3    glucose blood VI test strips (ACCU-CHEK SHANIQUA PLUS TEST STRP) strip USE TO CHECK BLOOD SUGAR TWICE DAILY 150 Strip 0     No Known Allergies    Family History   Problem Relation Age of Onset    No Known Problems Mother     Diabetes Father     Diabetes Sister     Cancer Brother         Prostate    Hypertension Brother     Diabetes Maternal Aunt      Social History     Tobacco Use    Smoking status: Never Smoker    Smokeless tobacco: Never Used   Substance Use Topics    Alcohol use: No         Lio Bernstein MD

## 2021-11-22 NOTE — PATIENT INSTRUCTIONS
Medicare Wellness Visit, Female     The best way to live healthy is to have a lifestyle where you eat a well-balanced diet, exercise regularly, limit alcohol use, and quit all forms of tobacco/nicotine, if applicable. Regular preventive services are another way to keep healthy. Preventive services (vaccines, screening tests, monitoring & exams) can help personalize your care plan, which helps you manage your own care. Screening tests can find health problems at the earliest stages, when they are easiest to treat. [facility] follows the current, evidence-based guidelines published by the [facility] ([facility]) when recommending preventive services for our patients. Because we follow these guidelines, sometimes recommendations change over time as research supports it. (For example, mammograms used to be recommended annually. Even though Medicare will still pay for an annual mammogram, the newer guidelines recommend a mammogram every two years for women of average risk). Of course, you and your doctor may decide to screen more often for some diseases, based on your risk and your co-morbidities (chronic disease you are already diagnosed with). Preventive services for you include:  - Medicare offers their members a free annual wellness visit, which is time for you and your primary care provider to discuss and plan for your preventive service needs. Take advantage of this benefit every year!  -All adults over the age of 72 should receive the recommended pneumonia vaccines. Current USPSTF guidelines recommend a series of two vaccines for the best pneumonia protection.   -All adults should have a flu vaccine yearly and a tetanus vaccine every 10 years.   -All adults age 48 and older should receive the shingles vaccines (series of two vaccines).       -All adults age 38-68 who are overweight should have a diabetes screening test once every three years.   -All adults born between [birth year] and 1965 should be screened once for Hepatitis C.  -Other screening tests and preventive services for persons with diabetes include: an eye exam to screen for diabetic retinopathy, a kidney function test, a foot exam, and stricter control over your cholesterol.   -Cardiovascular screening for adults with routine risk involves an electrocardiogram (ECG) at intervals determined by your doctor.   -Colorectal cancer screenings should be done for adults age 54-65 with no increased risk factors for colorectal cancer. There are a number of acceptable methods of screening for this type of cancer. Each test has its own benefits and drawbacks. Discuss with your doctor what is most appropriate for you during your annual wellness visit. The different tests include: colonoscopy (considered the best screening method), a fecal occult blood test, a fecal DNA test, and sigmoidoscopy.    -A bone mass density test is recommended when a woman turns 65 to screen for osteoporosis. This test is only recommended one time, as a screening. Some providers will use this same test as a disease monitoring tool if you already have osteoporosis. -Breast cancer screenings are recommended every other year for women of normal risk, age 54-69.  -Cervical cancer screenings for women over age 72 are only recommended with certain risk factors.      Here is a list of your current Health Maintenance items (your personalized list of preventive services) with a due date:  Health Maintenance Due   Topic Date Due    COVID-19 Vaccine (1) Never done    Shingles Vaccine (1 of 2) Never done    Bone Mineral Density   Never done    Pneumococcal Vaccine (1 of 1 - PPSV23) Never done    Diabetic Foot Care  08/31/2019    Eye Exam  10/18/2019    Annual Well Visit  06/18/2021    Albumin Urine Test  06/30/2021    Yearly Flu Vaccine (1) Never done

## 2021-11-29 ENCOUNTER — TELEPHONE (OUTPATIENT)
Dept: INTERNAL MEDICINE CLINIC | Age: 81
End: 2021-11-29

## 2021-11-29 ENCOUNTER — PATIENT OUTREACH (OUTPATIENT)
Dept: CASE MANAGEMENT | Age: 81
End: 2021-11-29

## 2021-11-29 NOTE — ACP (ADVANCE CARE PLANNING)
Advance Care Planning   Ambulatory ACP Specialist Patient Outreach    Date:  11/29/2021    ACP Specialist:  Jeanne Lu LPN    Outreach call to patient in follow-up to ACP Specialist referral from:    [x] PCP  [] Provider   [] Ambulatory Care Management [] Other     For:                  [x] Advance Directive Assistance              [x] Complete Portable DNR order              [] Complete POST/MOST              [x] Code Status Discussion             [x] Discuss Goals of Care             [x] Early ACP Decision-Making              [] Other (Specify)    Date Referral Received:11/24/21    Today's Outreach:  [x] First   [] Second  [] Third       Third outreach made by: [] Phone  [] Email / mail    [] [employer]     Intervention:  [] Spoke with Patient   [] Left VM requesting return call      Outcome: First attempt to contact pt regarding ACP. No answer on home phone. VM not available. Will attempt 2nd outreach within one week. Next Step:   [] ACP scheduled conversation  [x] Outreach again in one week               [] Email / Mail ACP Info Sheets  [] Email / Mail Advance Directive   [] Closing referral.  Routing closure to referring provider/staff and to ACP Specialist . [] Closure letter mailed to patient with invitation to contact ACP Specialist if / when ready.   Thank you for this referral.

## 2021-11-29 NOTE — TELEPHONE ENCOUNTER
Patient states she is returning a call to the office. Patient states she had appt & labs done last week. Please call.  Thank you

## 2021-11-29 NOTE — TELEPHONE ENCOUNTER
Called, spoke with Pt. Two pt identifiers confirmed. Pt stated she wasn't sure if it was this off because caller ID just says [facility]. Pt informed it wasn't anyone here. Pt stated ok no problem. Pt verbalized understanding of information discussed w/ no further questions at this time.

## 2021-11-30 NOTE — PROGRESS NOTES
Please send this patient a result letter if she does not have My Chart. Lipids: Worse  Your current lab results reveal a elevated total cholesterol A1c: And ldl. Your total cholesterol should be under 200, triglycerides under 150, and your ldl under 100. Work on following a low fat diet and exercise at least three times a week. A1c: Improved  Your current hgbA1c is lower than your last level of   7.9. Keep up the good work! Continue to work on following a diabetic diet and exercise. Recheck this test: hgbA1c  in  3 months. Continue with current  medications. CBC: Normal  Normal red and white blood cell levels.    CMP:Normal electrolyte levels except for an elevation in the glucose level, normal renal and liver function   Thyroid function: Normal Free T4  Slightly low TSH, but improved compared to prior results

## 2021-11-30 NOTE — PROGRESS NOTES
Called patient, no answer, left msg ACP person from [facility] is trying to call her, she needs to answer.

## 2021-12-01 NOTE — PROGRESS NOTES
Result letter mailed to pt informing of the following per Dr. Lott Class:  Lipids: Worse   Your current lab results reveal a elevated total cholesterol A1c: And ldl. Your total cholesterol should be under 200, triglycerides under 150, and your ldl under 100. Work on following a low fat diet and exercise at least three times a week.     A1c: Improved   Your current hgbA1c is lower than your last level of   7.9. Keep up the good work! Continue to work on following a diabetic diet and exercise. Recheck this test: hgbA1c  in  3 months. Continue with current  medications. CBC: Normal   Normal red and white blood cell levels.    CMP:Normal electrolyte levels except for an elevation in the glucose level, normal renal and liver function   Thyroid function: Normal Free T4   Slightly low TSH, but improved compared to prior results

## 2021-12-15 ENCOUNTER — PATIENT OUTREACH (OUTPATIENT)
Dept: CASE MANAGEMENT | Age: 81
End: 2021-12-15

## 2021-12-15 NOTE — ACP (ADVANCE CARE PLANNING)
Advance Care Planning   Ambulatory ACP Specialist Patient Outreach    Date:  12/15/2021    ACP Specialist:  Izabela Orantes LPN    Outreach call to patient in follow-up to ACP Specialist referral from:    [x] PCP  [] Provider   [] Ambulatory Care Management [] Other     For:                  [x] Advance Directive Assistance              [x] Complete Portable DNR order              [] Complete POST/MOST              [x] Code Status Discussion             [x] Discuss Goals of Care             [x] Early ACP Decision-Making              [] Other (Specify)    Date Referral Received: 11/24/21    Today's Outreach:  [] First   [x] Second  [] Third       Third outreach made by: [] Phone  [] Email / mail    [] [email or web address]     Intervention:  [] Spoke with Patient   [] Left VM requesting return call      Outcome: Second attempt to contact pt regarding ACP. No answer on mobile phone. VM not available. Will outreach again within one week. Next Step:   [] ACP scheduled conversation  [x] Outreach again in one week               [] Email / Mail ACP Info Sheets  [] Email / Mail Advance Directive   [] Closing referral.  Routing closure to referring provider/staff and to ACP Specialist . [] Closure letter mailed to patient with invitation to contact ACP Specialist if / when ready.   Thank you for this referral.

## 2021-12-16 ENCOUNTER — DOCUMENTATION ONLY (OUTPATIENT)
Dept: INTERNAL MEDICINE CLINIC | Age: 81
End: 2021-12-16

## 2021-12-20 ENCOUNTER — PATIENT OUTREACH (OUTPATIENT)
Dept: CASE MANAGEMENT | Age: 81
End: 2021-12-20

## 2021-12-20 NOTE — ACP (ADVANCE CARE PLANNING)
Advance Care Planning   Ambulatory ACP Specialist Patient Outreach    Date:  12/20/2021    ACP Specialist:  Mar Alford LPN    Outreach call to patient in follow-up to ACP Specialist referral from:    [x] PCP  [] Provider   [] Ambulatory Care Management [] Other     For:                  [x] Advance Directive Assistance              [x] Complete Portable DNR order              [] Complete POST/MOST              [x] Code Status Discussion             [x] Discuss Goals of Care             [x] Early ACP Decision-Making              [] Other (Specify)    Date Referral Received: 11/24/21    Today's Outreach:  [] First   [] Second  [x] Third       Third outreach made by: [] Phone  [x] Email / mail    [] [employer]     Intervention:  [] Spoke with Patient   [] Left VM requesting return call      Outcome:  Final attempt to reach to pt. ACP documents sent to pt via e-mail. My contact information was included. Will close referral at this time. Next Step:   [] ACP scheduled conversation  [] Outreach again in one week               [x] Email / Mail ACP Info Sheets  [] Email / Mail Advance Directive   [x] Closing referral.  Routing closure to referring provider/staff and to ACP Specialist . [x] Closure letter mailed to patient with invitation to contact ACP Specialist if / when ready.   Thank you for this referral.

## 2022-03-18 PROBLEM — E78.2 MIXED HYPERLIPIDEMIA: Status: ACTIVE | Noted: 2017-04-12

## 2022-03-19 PROBLEM — E03.9 ACQUIRED HYPOTHYROIDISM: Status: ACTIVE | Noted: 2017-05-30

## 2022-04-03 DIAGNOSIS — E03.9 ACQUIRED HYPOTHYROIDISM: ICD-10-CM

## 2022-04-03 RX ORDER — GLIPIZIDE 5 MG/1
TABLET ORAL
Qty: 135 TABLET | Refills: 3 | Status: SHIPPED | OUTPATIENT
Start: 2022-04-03

## 2022-04-03 RX ORDER — LEVOTHYROXINE SODIUM 25 UG/1
TABLET ORAL
Qty: 90 TABLET | Refills: 3 | Status: SHIPPED | OUTPATIENT
Start: 2022-04-03

## 2022-06-06 ENCOUNTER — TELEPHONE (OUTPATIENT)
Dept: PHARMACY | Age: 82
End: 2022-06-06

## 2022-06-06 NOTE — TELEPHONE ENCOUNTER
[facility] CLINICAL PHARMACY: ADHERENCE REVIEW  Identified care gap per [employer]: fills at [facility]: Diabetes adherence    Last Visit: 11/22/2021    NO LIS    Patient not found in Outcomes MTM    ASSESSMENT  ACE/ARB ADHERENCE      BP Readings from Last 3 Encounters:   11/22/21 136/74   06/03/19 151/67   11/19/18 138/72     CrCl cannot be calculated (Patient's most recent lab result is older than the maximum 180 days allowed. ). DIABETES ADHERENCE    Insurance Records claims through 05/01/2022 (2021 [facility] = n/a%; YTD [facility] = Filled only once; Potential Fail Date: 06/14/2022):   Glipizide 5mg last filled on 01/03/2022 for 90 day supply. Next refill due: 04/03/2022    Per [facility] Portal:   Glipizide 5mg last filled on 01/03/2022 for 90 day supply. Per [facility]:   Glipizide 5mg last picked up on 01/03/2022 for 90 day supply. 2 refills remaining. Billed through [facility] Value Date/Time    Hemoglobin A1c 7.3 (H) 11/22/2021 12:50 PM    Hemoglobin A1c 7.9 (H) 10/26/2020 12:03 PM    Hemoglobin A1c 8.2 (H) 06/30/2020 10:00 AM    Hemoglobin A1c (POC) 6.8 09/11/2017 10:51 AM     NOTE A1c <9%    STATIN ADHERENCE      Lab Results   Component Value Date/Time    Cholesterol, total 256 (H) 11/22/2021 12:50 PM    HDL Cholesterol 67 11/22/2021 12:50 PM    LDL, calculated 181.4 (H) 11/22/2021 12:50 PM    VLDL, calculated 7.6 11/22/2021 12:50 PM    Triglyceride 38 11/22/2021 12:50 PM    CHOL/HDL Ratio 3.8 11/22/2021 12:50 PM     ALT (SGPT)   Date Value Ref Range Status   11/22/2021 30 12 - 78 U/L Final     AST (SGOT)   Date Value Ref Range Status   11/22/2021 16 15 - 37 U/L Final     The ASCVD Risk score (Anjana Davis., et al., 2013) failed to calculate for the following reasons:     The 2013 ASCVD risk score is only valid for ages 36 to 78     PLAN  The following are interventions that have been identified:  - Patient overdue refilling Glipizide 5mg and active on home medication list     Possible patient is not taking medication as prescribed. Dose is 1 1/2 tabs daily. Sending letter for outreach.      Unable to leave message      Future Appointments   Date Time Provider Cee Fuentes   8/1/2022  1:20 PM Maral Torre MD [facility]  Direct: [phone]  Department, toll free:[phone], [address] in place: No   Gap Closed?: No   Time Spent (min): 20

## 2022-08-01 ENCOUNTER — OFFICE VISIT (OUTPATIENT)
Dept: INTERNAL MEDICINE CLINIC | Age: 82
End: 2022-08-01
Payer: MEDICARE

## 2022-08-01 VITALS
OXYGEN SATURATION: 97 % | HEART RATE: 96 BPM | HEIGHT: 63 IN | WEIGHT: 119.8 LBS | BODY MASS INDEX: 21.23 KG/M2 | RESPIRATION RATE: 16 BRPM | DIASTOLIC BLOOD PRESSURE: 71 MMHG | SYSTOLIC BLOOD PRESSURE: 132 MMHG | TEMPERATURE: 99.2 F

## 2022-08-01 DIAGNOSIS — E03.9 ACQUIRED HYPOTHYROIDISM: ICD-10-CM

## 2022-08-01 DIAGNOSIS — I10 HYPERTENSION, ESSENTIAL, BENIGN: ICD-10-CM

## 2022-08-01 DIAGNOSIS — E11.9 TYPE 2 DIABETES MELLITUS WITHOUT COMPLICATION, WITHOUT LONG-TERM CURRENT USE OF INSULIN (HCC): Primary | ICD-10-CM

## 2022-08-01 PROCEDURE — G8420 CALC BMI NORM PARAMETERS: HCPCS | Performed by: FAMILY MEDICINE

## 2022-08-01 PROCEDURE — G8536 NO DOC ELDER MAL SCRN: HCPCS | Performed by: FAMILY MEDICINE

## 2022-08-01 PROCEDURE — G8427 DOCREV CUR MEDS BY ELIG CLIN: HCPCS | Performed by: FAMILY MEDICINE

## 2022-08-01 PROCEDURE — 1101F PT FALLS ASSESS-DOCD LE1/YR: CPT | Performed by: FAMILY MEDICINE

## 2022-08-01 PROCEDURE — 99214 OFFICE O/P EST MOD 30 MIN: CPT | Performed by: FAMILY MEDICINE

## 2022-08-01 PROCEDURE — G8752 SYS BP LESS 140: HCPCS | Performed by: FAMILY MEDICINE

## 2022-08-01 PROCEDURE — G8754 DIAS BP LESS 90: HCPCS | Performed by: FAMILY MEDICINE

## 2022-08-01 PROCEDURE — G8510 SCR DEP NEG, NO PLAN REQD: HCPCS | Performed by: FAMILY MEDICINE

## 2022-08-01 PROCEDURE — G8400 PT W/DXA NO RESULTS DOC: HCPCS | Performed by: FAMILY MEDICINE

## 2022-08-01 PROCEDURE — 1090F PRES/ABSN URINE INCON ASSESS: CPT | Performed by: FAMILY MEDICINE

## 2022-08-01 NOTE — PROGRESS NOTES
1. \"Have you been to the ER, urgent care clinic since your last visit? Hospitalized since your last visit? \" No    2. \"Have you seen or consulted any other health care providers outside of the [address] [place] [address] since your last visit? \" No     3. For patients aged 39-70: Has the patient had a colonoscopy / FIT/ Cologuard? NA - based on age      If the patient is female:    4. For patients aged 41-77: Has the patient had a mammogram within the past 2 years? NA - based on age or sex      11. For patients aged 21-65: Has the patient had a pap smear?  NA - based on age or sex

## 2022-08-01 NOTE — PROGRESS NOTES
SUBJECTIVE:   Ms. Gale Knapp is a 78 y.o. female who is here for follow up of routine medical issues. She is doing well. She has recently lost some weight. She is gardening. DM: Pt is compliant in taking Glipizide. Patient denies fatigue, dizziness, polydipsia, polyuria, polyphagia, pancreatitis, increased sugar consumption, sore/bleeding gums, blurred vision, or cuts that will not heal.     Weight Changes: Her weight fluctuates between 118 and 125 lbs. She is eating consistent meals. Pt specifically denies hearing changes, memory changes, trouble with swallowing or taste, CP, SOB, heartburn or upset stomach, change in bowel habits, problems urinating, unusual joint or muscle pains, numbness or tingling in extremities, or skin lesions of concern. At this time, she is otherwise doing well and has brought no other complaints to my attention today. For a list of the medical issues addressed today, see the assessment and plan below. PREVENTIVE:  Pap:Deferred  Mammogram: Deferred  Dexa: Deferred  COVID: 3 shots    PMH:   Past Medical History:   Diagnosis Date    Cancer ([facility] [address]     skin    Diabetes ([facility] [address]     Hypertension     Hypothyroidism 1993     [address]:  has a past surgical history that includes hx partial thyroidectomy (1993) and hx orthopaedic. All: has No Known Allergies. MEDS:   Current Outpatient Medications   Medication Sig    glipiZIDE (GLUCOTROL) 5 mg tablet TAKE 1 AND 1/2 TABLETS BY MOUTH DAILY    levothyroxine (SYNTHROID) 25 mcg tablet TAKE 1 TABLET BY MOUTH BEFORE BREAKFAST    Blood-Glucose Meter monitoring kit Check blood glucose daily. lancets misc Check blood glucose daily. glucose blood VI test strips (ASCENSIA AUTODISC VI, ONE TOUCH ULTRA TEST VI) strip Check blood glucose daily.     Accu-Chek Shaniqua Plus test strp strip USE TO CHECK BLOOD SUGAR TWICE DAILY AS DIRECTED    glucose blood VI test strips (ACCU-CHEK SHANIQUA PLUS TEST STRP) strip USE TO CHECK BLOOD SUGAR TWICE DAILY     No current facility-administered medications for this visit. FH: family history includes Cancer in her brother; Diabetes in her father, maternal aunt, and sister; Hypertension in her brother; No Known Problems in her mother. SH:  reports that she has never smoked. She has never used smokeless tobacco. She reports that she does not drink alcohol and does not use drugs. Review of Systems - History obtained from the patient  General ROS: no fever, chills, fatigue, body aches  Psychological ROS: no change in anxiety, depression, SI/HI  Ophthalmic ROS: no blurred vision, myopia, double vision  ENT ROS: no dysphagia, otalgia, otorrhea, rhinorrhea, post nasal drip  Respiratory ROS: no cough, shortness of breath, or wheezing  Cardiovascular ROS: no chest pain or dyspnea on exertion  Gastrointestinal ROS: no abdominal pain, change in bowel habits, or black or bloody stools  Genito-Urinary ROS: no frequency, urgency, incontinence, dysuria, hematouria  Musculoskeletal ROS: no arthralagia, myalgia  Neurological ROS: no headaches, dizziness, lightheadedness, tremors, seizures  Dermatological ROS: no rash or lesions    OBJECTIVE:   Vitals: Visit Vitals  /71 (BP 1 Location: Left arm, BP Patient Position: Sitting, BP Cuff Size: Small adult)   Pulse 96   Temp 99.2 °F (37.3 °C) (Temporal)   Resp 16   Ht 5' 3\" (1.6 m)   Wt 119 lb 12.8 oz (54.3 kg)   SpO2 97%   BMI 21.22 kg/m²      Gen: Pleasant 78 y.o.  female in NAD. HEENT: PERRLA. EOMI. OP moist and pink. Neck: Supple. No LAD. HEART: RRR, No M/G/R.      LUNGS: CTAB No W/R. ABDOMEN: S, NT, ND, BS+. EXTREMITIES: Warm. No C/C/E.    MUSCULOSKELETAL: Normal ROM, muscle strength 5/5 all groups. NEURO: Alert and oriented x 3. Cranial nerves grossly intact. No focal sensory or motor deficits noted. SKIN: Warm. Dry. No rashes or other lesions noted. ASSESSMENT/ PLAN: Diagnoses and all orders for this visit:    1.  Type 2 diabetes mellitus without complication, without long-term current use of insulin (HCC)  -     HEMOGLOBIN A1C WITH EAG; Future  -     MICROALBUMIN, UR, RAND W/ MICROALB/CREAT RATIO; Future    2. Hypertension, essential, benign  -     CBC WITH AUTOMATED DIFF; Future  -     METABOLIC PANEL, COMPREHENSIVE; Future    3. Acquired hypothyroidism  -     TSH 3RD GENERATION; Future  -     T4, FREE; Future      ICD-10-CM ICD-9-CM    1. Type 2 diabetes mellitus without complication, without long-term current use of insulin (HCC)  E11.9 250.00 HEMOGLOBIN A1C WITH EAG      MICROALBUMIN, UR, RAND W/ MICROALB/CREAT RATIO      HEMOGLOBIN A1C WITH EAG      MICROALBUMIN, UR, RAND W/ MICROALB/CREAT RATIO      CANCELED: HEMOGLOBIN A1C WITH EAG      CANCELED: MICROALBUMIN, UR, RAND W/ MICROALB/CREAT RATIO      2. Hypertension, essential, benign  I10 401.1 CBC WITH AUTOMATED DIFF      METABOLIC PANEL, COMPREHENSIVE      METABOLIC PANEL, COMPREHENSIVE      CBC WITH AUTOMATED DIFF      CANCELED: METABOLIC PANEL, COMPREHENSIVE      CANCELED: CBC WITH AUTOMATED DIFF      3. Acquired hypothyroidism  E03.9 244.9 TSH 3RD GENERATION      T4, FREE      T4, FREE      TSH 3RD GENERATION      CANCELED: T4, FREE      CANCELED: TSH 3RD GENERATION           DM type 2: Pt's blood sugar seems to be well controlled. I advised pt to continue current dose of Glipizide, avoid sugars and starches, and to increase exercise when possible. Recheck hemoglobin A1c and microalbumin. Hypertension: BP seems to be well controlled. I recommended continuing current dose of medications, eating a low sodium diet, and increasing exercise. Recheck CMP and CBC. Acquired hypothyroidism: I ordered a T4 and TSH test due to fluctuations of her weight. Follow-up and Dispositions    Return in about 6 months (around 2/1/2023) for AWV. I have reviewed the patient's medications and risks/side effects/benefits were discussed. Diagnosis(-es) explained to patient and questions answered. Literature provided where appropriate.      Written by Lincoln Resendiz, as dictated by Megan Barone MD.

## 2022-08-02 LAB
ALBUMIN SERPL-MCNC: 3.4 G/DL (ref 3.5–5)
ALBUMIN/GLOB SERPL: 1 {RATIO} (ref 1.1–2.2)
ALP SERPL-CCNC: 77 U/L (ref 45–117)
ALT SERPL-CCNC: 26 U/L (ref 12–78)
ANION GAP SERPL CALC-SCNC: 5 MMOL/L (ref 5–15)
AST SERPL-CCNC: 11 U/L (ref 15–37)
BASOPHILS # BLD: 0 K/UL (ref 0–0.1)
BASOPHILS NFR BLD: 1 % (ref 0–1)
BILIRUB SERPL-MCNC: 0.2 MG/DL (ref 0.2–1)
BUN SERPL-MCNC: 22 MG/DL (ref 6–20)
BUN/CREAT SERPL: 32 (ref 12–20)
CALCIUM SERPL-MCNC: 9.8 MG/DL (ref 8.5–10.1)
CHLORIDE SERPL-SCNC: 107 MMOL/L (ref 97–108)
CO2 SERPL-SCNC: 29 MMOL/L (ref 21–32)
CREAT SERPL-MCNC: 0.69 MG/DL (ref 0.55–1.02)
CREAT UR-MCNC: 102 MG/DL
DIFFERENTIAL METHOD BLD: NORMAL
EOSINOPHIL # BLD: 0 K/UL (ref 0–0.4)
EOSINOPHIL NFR BLD: 1 % (ref 0–7)
ERYTHROCYTE [DISTWIDTH] IN BLOOD BY AUTOMATED COUNT: 14 % (ref 11.5–14.5)
EST. AVERAGE GLUCOSE BLD GHB EST-MCNC: 189 MG/DL
GLOBULIN SER CALC-MCNC: 3.4 G/DL (ref 2–4)
GLUCOSE SERPL-MCNC: 206 MG/DL (ref 65–100)
HBA1C MFR BLD: 8.2 % (ref 4–5.6)
HCT VFR BLD AUTO: 38.4 % (ref 35–47)
HGB BLD-MCNC: 12.4 G/DL (ref 11.5–16)
IMM GRANULOCYTES # BLD AUTO: 0 K/UL (ref 0–0.04)
IMM GRANULOCYTES NFR BLD AUTO: 0 % (ref 0–0.5)
LYMPHOCYTES # BLD: 1.2 K/UL (ref 0.8–3.5)
LYMPHOCYTES NFR BLD: 23 % (ref 12–49)
MCH RBC QN AUTO: 29.7 PG (ref 26–34)
MCHC RBC AUTO-ENTMCNC: 32.3 G/DL (ref 30–36.5)
MCV RBC AUTO: 92.1 FL (ref 80–99)
MICROALBUMIN UR-MCNC: 2.66 MG/DL
MICROALBUMIN/CREAT UR-RTO: 26 MG/G (ref 0–30)
MONOCYTES # BLD: 0.5 K/UL (ref 0–1)
MONOCYTES NFR BLD: 10 % (ref 5–13)
NEUTS SEG # BLD: 3.5 K/UL (ref 1.8–8)
NEUTS SEG NFR BLD: 65 % (ref 32–75)
NRBC # BLD: 0 K/UL (ref 0–0.01)
NRBC BLD-RTO: 0 PER 100 WBC
PLATELET # BLD AUTO: 289 K/UL (ref 150–400)
PMV BLD AUTO: 10.8 FL (ref 8.9–12.9)
POTASSIUM SERPL-SCNC: 4.4 MMOL/L (ref 3.5–5.1)
PROT SERPL-MCNC: 6.8 G/DL (ref 6.4–8.2)
RBC # BLD AUTO: 4.17 M/UL (ref 3.8–5.2)
SODIUM SERPL-SCNC: 141 MMOL/L (ref 136–145)
T4 FREE SERPL-MCNC: 1.1 NG/DL (ref 0.8–1.5)
TSH SERPL DL<=0.05 MIU/L-ACNC: 0.3 UIU/ML (ref 0.36–3.74)
WBC # BLD AUTO: 5.3 K/UL (ref 3.6–11)

## 2022-08-31 NOTE — PROGRESS NOTES
T4: Normal range  TSH: Low  Continue with the current dose of Synthroid, however take a half of a tablet on Sunday. A1c:Your current hgbA1c 8.2 is higher than your last level of 7.3. Work on following a diabetic diet and exercise. Recheck this test: hgbA1c  in  3 months. Continue with current  medications. CMP:Normal electrolyte levels except for an elevation in the glucose level, normal renal and liver function   CBC:Normal red and white blood cell levels.    Microalbumin: Normal

## 2022-09-01 ENCOUNTER — TELEPHONE (OUTPATIENT)
Dept: INTERNAL MEDICINE CLINIC | Age: 82
End: 2022-09-01

## 2022-09-01 DIAGNOSIS — E11.9 TYPE 2 DIABETES MELLITUS WITHOUT COMPLICATION, WITHOUT LONG-TERM CURRENT USE OF INSULIN (HCC): Primary | ICD-10-CM

## 2022-09-01 NOTE — TELEPHONE ENCOUNTER
Per Dr. Carolynn Vega:    T4: Normal range  TSH: Low  Continue with the current dose of Synthroid, however take a half of a tablet on Sunday. A1c:Your current hgbA1c 8.2 is higher than your last level of 7.3. Work on following a diabetic diet and exercise. Recheck this test: hgbA1c  in  3 months. Continue with current  medications. CMP:Normal electrolyte levels except for an elevation in the glucose level, normal renal and liver function   CBC:Normal red and white blood cell levels. Microalbumin: Normal      Called, spoke with Pt. Two pt identifiers confirmed. Patient notified of lab results and response from Dr. Carolynn Vega     Pt verbalized understanding of information discussed w/ no further questions at this time.     VORB per Dr. Carolynn Vega

## 2022-09-01 NOTE — PROGRESS NOTES
Called, spoke with Pt. Two pt identifiers confirmed. Patient notified of lab results and response from Dr. Jayla Lockett    Pt verbalized understanding of information discussed w/ no further questions at this time.

## 2022-09-14 ENCOUNTER — APPOINTMENT (OUTPATIENT)
Dept: CT IMAGING | Age: 82
End: 2022-09-14
Attending: EMERGENCY MEDICINE
Payer: MEDICARE

## 2022-09-14 ENCOUNTER — HOSPITAL ENCOUNTER (EMERGENCY)
Age: 82
Discharge: HOME | End: 2022-09-14
Attending: EMERGENCY MEDICINE
Payer: MEDICARE

## 2022-09-14 VITALS
SYSTOLIC BLOOD PRESSURE: 127 MMHG | WEIGHT: 120 LBS | HEART RATE: 77 BPM | OXYGEN SATURATION: 99 % | RESPIRATION RATE: 18 BRPM | DIASTOLIC BLOOD PRESSURE: 62 MMHG | BODY MASS INDEX: 21.26 KG/M2 | TEMPERATURE: 98.7 F | HEIGHT: 63 IN

## 2022-09-14 DIAGNOSIS — M47.816 OSTEOARTHRITIS OF LUMBAR SPINE, UNSPECIFIED SPINAL OSTEOARTHRITIS COMPLICATION STATUS: Primary | ICD-10-CM

## 2022-09-14 DIAGNOSIS — M16.0 OSTEOARTHRITIS OF BOTH HIPS, UNSPECIFIED OSTEOARTHRITIS TYPE: ICD-10-CM

## 2022-09-14 LAB
ALBUMIN SERPL-MCNC: 3.5 G/DL (ref 3.5–5)
ALBUMIN/GLOB SERPL: 0.9 {RATIO} (ref 1.1–2.2)
ALP SERPL-CCNC: 68 U/L (ref 45–117)
ALT SERPL-CCNC: 30 U/L (ref 12–78)
ANION GAP SERPL CALC-SCNC: 5 MMOL/L (ref 5–15)
APPEARANCE UR: CLEAR
AST SERPL-CCNC: 31 U/L (ref 15–37)
BACTERIA URNS QL MICRO: NEGATIVE /HPF
BASOPHILS # BLD: 0 K/UL (ref 0–0.1)
BASOPHILS NFR BLD: 0 % (ref 0–1)
BILIRUB SERPL-MCNC: 0.5 MG/DL (ref 0.2–1)
BILIRUB UR QL: NEGATIVE
BNP SERPL-MCNC: 38 PG/ML
BUN SERPL-MCNC: 23 MG/DL (ref 6–20)
BUN/CREAT SERPL: 38 (ref 12–20)
CALCIUM SERPL-MCNC: 9.1 MG/DL (ref 8.5–10.1)
CHLORIDE SERPL-SCNC: 109 MMOL/L (ref 97–108)
CO2 SERPL-SCNC: 23 MMOL/L (ref 21–32)
COLOR UR: ABNORMAL
CREAT SERPL-MCNC: 0.61 MG/DL (ref 0.55–1.02)
DIFFERENTIAL METHOD BLD: ABNORMAL
EOSINOPHIL # BLD: 0 K/UL (ref 0–0.4)
EOSINOPHIL NFR BLD: 0 % (ref 0–7)
EPITH CASTS URNS QL MICRO: ABNORMAL /LPF
ERYTHROCYTE [DISTWIDTH] IN BLOOD BY AUTOMATED COUNT: 14.2 % (ref 11.5–14.5)
GLOBULIN SER CALC-MCNC: 3.9 G/DL (ref 2–4)
GLUCOSE SERPL-MCNC: 140 MG/DL (ref 65–100)
GLUCOSE UR STRIP.AUTO-MCNC: NEGATIVE MG/DL
HCT VFR BLD AUTO: 36.9 % (ref 35–47)
HGB BLD-MCNC: 12.5 G/DL (ref 11.5–16)
HGB UR QL STRIP: NEGATIVE
HYALINE CASTS URNS QL MICRO: ABNORMAL /LPF (ref 0–2)
IMM GRANULOCYTES # BLD AUTO: 0 K/UL (ref 0–0.04)
IMM GRANULOCYTES NFR BLD AUTO: 0 % (ref 0–0.5)
KETONES UR QL STRIP.AUTO: 40 MG/DL
LEUKOCYTE ESTERASE UR QL STRIP.AUTO: ABNORMAL
LYMPHOCYTES # BLD: 1.1 K/UL (ref 0.8–3.5)
LYMPHOCYTES NFR BLD: 12 % (ref 12–49)
MAGNESIUM SERPL-MCNC: 2.1 MG/DL (ref 1.6–2.4)
MCH RBC QN AUTO: 29.7 PG (ref 26–34)
MCHC RBC AUTO-ENTMCNC: 33.9 G/DL (ref 30–36.5)
MCV RBC AUTO: 87.6 FL (ref 80–99)
MONOCYTES # BLD: 1 K/UL (ref 0–1)
MONOCYTES NFR BLD: 11 % (ref 5–13)
NEUTS SEG # BLD: 6.9 K/UL (ref 1.8–8)
NEUTS SEG NFR BLD: 77 % (ref 32–75)
NITRITE UR QL STRIP.AUTO: NEGATIVE
NRBC # BLD: 0 K/UL (ref 0–0.01)
NRBC BLD-RTO: 0 PER 100 WBC
PH UR STRIP: 5.5 [PH] (ref 5–8)
PLATELET # BLD AUTO: 239 K/UL (ref 150–400)
PMV BLD AUTO: 10.1 FL (ref 8.9–12.9)
POTASSIUM SERPL-SCNC: 4.7 MMOL/L (ref 3.5–5.1)
PROT SERPL-MCNC: 7.4 G/DL (ref 6.4–8.2)
PROT UR STRIP-MCNC: ABNORMAL MG/DL
RBC # BLD AUTO: 4.21 M/UL (ref 3.8–5.2)
RBC #/AREA URNS HPF: ABNORMAL /HPF (ref 0–5)
SODIUM SERPL-SCNC: 137 MMOL/L (ref 136–145)
SP GR UR REFRACTOMETRY: 1.02
UA: UC IF INDICATED,UAUC: ABNORMAL
UROBILINOGEN UR QL STRIP.AUTO: 0.2 EU/DL (ref 0.2–1)
WBC # BLD AUTO: 9 K/UL (ref 3.6–11)
WBC URNS QL MICRO: ABNORMAL /HPF (ref 0–4)

## 2022-09-14 PROCEDURE — 72131 CT LUMBAR SPINE W/O DYE: CPT

## 2022-09-14 PROCEDURE — 83880 ASSAY OF NATRIURETIC PEPTIDE: CPT

## 2022-09-14 PROCEDURE — 74011250636 HC RX REV CODE- 250/636: Performed by: EMERGENCY MEDICINE

## 2022-09-14 PROCEDURE — 36415 COLL VENOUS BLD VENIPUNCTURE: CPT

## 2022-09-14 PROCEDURE — 85025 COMPLETE CBC W/AUTO DIFF WBC: CPT

## 2022-09-14 PROCEDURE — 81001 URINALYSIS AUTO W/SCOPE: CPT

## 2022-09-14 PROCEDURE — 83735 ASSAY OF MAGNESIUM: CPT

## 2022-09-14 PROCEDURE — 87186 SC STD MICRODIL/AGAR DIL: CPT

## 2022-09-14 PROCEDURE — 74011250637 HC RX REV CODE- 250/637: Performed by: EMERGENCY MEDICINE

## 2022-09-14 PROCEDURE — 80053 COMPREHEN METABOLIC PANEL: CPT

## 2022-09-14 PROCEDURE — 96374 THER/PROPH/DIAG INJ IV PUSH: CPT

## 2022-09-14 PROCEDURE — 72192 CT PELVIS W/O DYE: CPT

## 2022-09-14 PROCEDURE — 87077 CULTURE AEROBIC IDENTIFY: CPT

## 2022-09-14 PROCEDURE — 87086 URINE CULTURE/COLONY COUNT: CPT

## 2022-09-14 PROCEDURE — 99284 EMERGENCY DEPT VISIT MOD MDM: CPT

## 2022-09-14 RX ORDER — OXYCODONE HYDROCHLORIDE 5 MG/1
5 TABLET ORAL
Qty: 10 TABLET | Refills: 0 | Status: SHIPPED | OUTPATIENT
Start: 2022-09-14 | End: 2022-09-17

## 2022-09-14 RX ORDER — OXYCODONE HYDROCHLORIDE 5 MG/1
5 TABLET ORAL
Status: COMPLETED | OUTPATIENT
Start: 2022-09-14 | End: 2022-09-14

## 2022-09-14 RX ORDER — KETOROLAC TROMETHAMINE 30 MG/ML
15 INJECTION, SOLUTION INTRAMUSCULAR; INTRAVENOUS
Status: COMPLETED | OUTPATIENT
Start: 2022-09-14 | End: 2022-09-14

## 2022-09-14 RX ADMIN — OXYCODONE 5 MG: 5 TABLET ORAL at 21:49

## 2022-09-14 RX ADMIN — KETOROLAC TROMETHAMINE 15 MG: 30 INJECTION, SOLUTION INTRAMUSCULAR at 21:49

## 2022-09-15 NOTE — ED NOTES
Patient able to ambulate to the doorway and back to bed with walker and staff. Patient states pain in right posterior thigh while walking.  \"I feel like I have a pulled muscle\"

## 2022-09-16 ENCOUNTER — TELEPHONE (OUTPATIENT)
Dept: INTERNAL MEDICINE CLINIC | Age: 82
End: 2022-09-16

## 2022-09-16 DIAGNOSIS — E11.9 TYPE 2 DIABETES MELLITUS WITHOUT COMPLICATION, WITHOUT LONG-TERM CURRENT USE OF INSULIN (HCC): Primary | ICD-10-CM

## 2022-09-16 NOTE — TELEPHONE ENCOUNTER
Pharmacy told pt that this was denied as she hadn't been seen. Pt was seen by Dr. Ronald Rodriguez on 8-1-22 and asked how often does she really need to come? Pt needs these as soon as possible.

## 2022-09-16 NOTE — TELEPHONE ENCOUNTER
#[phone] Ms. Jose C Knott called stating that pt has back pain as her back gave out on 9-14-22 and could not walk. .  she went to the ED for this and they found nothing but arthritis. The pain is going down pt's right leg and it is making it very difficult to walk. Pt is having spasms in the back causing this. Pt is asking for something to be called in for the spasms. Please call to let them know what you can do. Ms. Jose C Knott is on [address].

## 2022-09-16 NOTE — TELEPHONE ENCOUNTER
Called, spoke with patient daughter Lindie Gaucher  Two pt identifiers confirmed.     Went to ER on 9/14/2022 due to severe back pain with spasms going down RT leg was prescribed Oxycodone in ER which makes her sleepy       Offered and accepted 1st in office appt with Dr. Lemmie Olszewski for 9/19/2022 @ 10:40 am

## 2022-09-16 NOTE — TELEPHONE ENCOUNTER
PCP: Mark Oneil MD    Last appt: 8/1/2022  No future appointments.     Requested Prescriptions     Pending Prescriptions Disp Refills    glucose blood VI test strips (Accu-Chek Shaniqua Plus test strp) strip 200 Strip 3     Sig: USE TO CHECK BLOOD SUGAR TWICE DAILY

## 2022-09-17 ENCOUNTER — HOSPITAL ENCOUNTER (EMERGENCY)
Age: 82
Discharge: HOME | End: 2022-09-17
Attending: STUDENT IN AN ORGANIZED HEALTH CARE EDUCATION/TRAINING PROGRAM
Payer: MEDICARE

## 2022-09-17 ENCOUNTER — APPOINTMENT (OUTPATIENT)
Dept: VASCULAR SURGERY | Age: 82
End: 2022-09-17
Attending: STUDENT IN AN ORGANIZED HEALTH CARE EDUCATION/TRAINING PROGRAM
Payer: MEDICARE

## 2022-09-17 VITALS
TEMPERATURE: 97.7 F | HEART RATE: 79 BPM | OXYGEN SATURATION: 99 % | RESPIRATION RATE: 28 BRPM | SYSTOLIC BLOOD PRESSURE: 127 MMHG | DIASTOLIC BLOOD PRESSURE: 54 MMHG

## 2022-09-17 DIAGNOSIS — L03.115 CELLULITIS OF RIGHT LOWER EXTREMITY: ICD-10-CM

## 2022-09-17 DIAGNOSIS — M54.31 SCIATICA OF RIGHT SIDE: Primary | ICD-10-CM

## 2022-09-17 LAB
ALBUMIN SERPL-MCNC: 3.4 G/DL (ref 3.5–5)
ALBUMIN/GLOB SERPL: 0.8 {RATIO} (ref 1.1–2.2)
ALP SERPL-CCNC: 68 U/L (ref 45–117)
ALT SERPL-CCNC: 40 U/L (ref 12–78)
ANION GAP SERPL CALC-SCNC: 4 MMOL/L (ref 5–15)
APPEARANCE UR: CLEAR
AST SERPL-CCNC: 33 U/L (ref 15–37)
BACTERIA SPEC CULT: ABNORMAL
BACTERIA SPEC CULT: ABNORMAL
BACTERIA URNS QL MICRO: ABNORMAL /HPF
BASOPHILS # BLD: 0 K/UL (ref 0–0.1)
BASOPHILS NFR BLD: 0 % (ref 0–1)
BILIRUB SERPL-MCNC: 0.4 MG/DL (ref 0.2–1)
BILIRUB UR QL: NEGATIVE
BNP SERPL-MCNC: 40 PG/ML
BUN SERPL-MCNC: 35 MG/DL (ref 6–20)
BUN/CREAT SERPL: 49 (ref 12–20)
CALCIUM SERPL-MCNC: 9.9 MG/DL (ref 8.5–10.1)
CC UR VC: ABNORMAL
CHLORIDE SERPL-SCNC: 108 MMOL/L (ref 97–108)
CO2 SERPL-SCNC: 29 MMOL/L (ref 21–32)
COLOR UR: ABNORMAL
COMMENT, HOLDF: NORMAL
CREAT SERPL-MCNC: 0.71 MG/DL (ref 0.55–1.02)
DIFFERENTIAL METHOD BLD: ABNORMAL
EOSINOPHIL # BLD: 0 K/UL (ref 0–0.4)
EOSINOPHIL NFR BLD: 0 % (ref 0–7)
EPITH CASTS URNS QL MICRO: ABNORMAL /LPF
ERYTHROCYTE [DISTWIDTH] IN BLOOD BY AUTOMATED COUNT: 14.5 % (ref 11.5–14.5)
GLOBULIN SER CALC-MCNC: 4.5 G/DL (ref 2–4)
GLUCOSE SERPL-MCNC: 193 MG/DL (ref 65–100)
GLUCOSE UR STRIP.AUTO-MCNC: NEGATIVE MG/DL
HCT VFR BLD AUTO: 36.9 % (ref 35–47)
HGB BLD-MCNC: 12.7 G/DL (ref 11.5–16)
HGB UR QL STRIP: ABNORMAL
HYALINE CASTS URNS QL MICRO: ABNORMAL /LPF (ref 0–5)
IMM GRANULOCYTES # BLD AUTO: 0 K/UL (ref 0–0.04)
IMM GRANULOCYTES NFR BLD AUTO: 0 % (ref 0–0.5)
KETONES UR QL STRIP.AUTO: 15 MG/DL
LEUKOCYTE ESTERASE UR QL STRIP.AUTO: ABNORMAL
LYMPHOCYTES # BLD: 1.1 K/UL (ref 0.8–3.5)
LYMPHOCYTES NFR BLD: 12 % (ref 12–49)
MCH RBC QN AUTO: 30.6 PG (ref 26–34)
MCHC RBC AUTO-ENTMCNC: 34.4 G/DL (ref 30–36.5)
MCV RBC AUTO: 88.9 FL (ref 80–99)
MONOCYTES # BLD: 0.9 K/UL (ref 0–1)
MONOCYTES NFR BLD: 9 % (ref 5–13)
NEUTS SEG # BLD: 7.5 K/UL (ref 1.8–8)
NEUTS SEG NFR BLD: 79 % (ref 32–75)
NITRITE UR QL STRIP.AUTO: POSITIVE
NRBC # BLD: 0 K/UL (ref 0–0.01)
NRBC BLD-RTO: 0 PER 100 WBC
PH UR STRIP: 5.5 [PH] (ref 5–8)
PLATELET # BLD AUTO: 219 K/UL (ref 150–400)
PMV BLD AUTO: 11.9 FL (ref 8.9–12.9)
POTASSIUM SERPL-SCNC: 3.7 MMOL/L (ref 3.5–5.1)
PROT SERPL-MCNC: 7.9 G/DL (ref 6.4–8.2)
PROT UR STRIP-MCNC: ABNORMAL MG/DL
RBC # BLD AUTO: 4.15 M/UL (ref 3.8–5.2)
RBC #/AREA URNS HPF: ABNORMAL /HPF (ref 0–5)
SAMPLES BEING HELD,HOLD: NORMAL
SERVICE CMNT-IMP: ABNORMAL
SODIUM SERPL-SCNC: 141 MMOL/L (ref 136–145)
SP GR UR REFRACTOMETRY: 1.03 (ref 1–1.03)
UA: UC IF INDICATED,UAUC: ABNORMAL
UROBILINOGEN UR QL STRIP.AUTO: 1 EU/DL (ref 0.2–1)
WBC # BLD AUTO: 9.6 K/UL (ref 3.6–11)
WBC URNS QL MICRO: ABNORMAL /HPF (ref 0–4)

## 2022-09-17 PROCEDURE — 87077 CULTURE AEROBIC IDENTIFY: CPT

## 2022-09-17 PROCEDURE — 80053 COMPREHEN METABOLIC PANEL: CPT

## 2022-09-17 PROCEDURE — 99284 EMERGENCY DEPT VISIT MOD MDM: CPT

## 2022-09-17 PROCEDURE — 81001 URINALYSIS AUTO W/SCOPE: CPT

## 2022-09-17 PROCEDURE — 93005 ELECTROCARDIOGRAM TRACING: CPT

## 2022-09-17 PROCEDURE — 93970 EXTREMITY STUDY: CPT

## 2022-09-17 PROCEDURE — 36415 COLL VENOUS BLD VENIPUNCTURE: CPT

## 2022-09-17 PROCEDURE — 83880 ASSAY OF NATRIURETIC PEPTIDE: CPT

## 2022-09-17 PROCEDURE — 74011250637 HC RX REV CODE- 250/637: Performed by: STUDENT IN AN ORGANIZED HEALTH CARE EDUCATION/TRAINING PROGRAM

## 2022-09-17 PROCEDURE — 87086 URINE CULTURE/COLONY COUNT: CPT

## 2022-09-17 PROCEDURE — 87186 SC STD MICRODIL/AGAR DIL: CPT

## 2022-09-17 PROCEDURE — 96374 THER/PROPH/DIAG INJ IV PUSH: CPT

## 2022-09-17 PROCEDURE — 85025 COMPLETE CBC W/AUTO DIFF WBC: CPT

## 2022-09-17 PROCEDURE — 74011250636 HC RX REV CODE- 250/636: Performed by: STUDENT IN AN ORGANIZED HEALTH CARE EDUCATION/TRAINING PROGRAM

## 2022-09-17 RX ORDER — PREDNISONE 20 MG/1
40 TABLET ORAL DAILY
Qty: 10 TABLET | Refills: 0 | Status: SHIPPED | OUTPATIENT
Start: 2022-09-17 | End: 2022-09-22

## 2022-09-17 RX ORDER — KETOROLAC TROMETHAMINE 30 MG/ML
15 INJECTION, SOLUTION INTRAMUSCULAR; INTRAVENOUS ONCE
Status: COMPLETED | OUTPATIENT
Start: 2022-09-17 | End: 2022-09-17

## 2022-09-17 RX ORDER — CEPHALEXIN 500 MG/1
500 CAPSULE ORAL
Status: COMPLETED | OUTPATIENT
Start: 2022-09-17 | End: 2022-09-17

## 2022-09-17 RX ORDER — FAMOTIDINE 20 MG/1
20 TABLET, FILM COATED ORAL 2 TIMES DAILY
Qty: 20 TABLET | Refills: 0 | Status: SHIPPED | OUTPATIENT
Start: 2022-09-17 | End: 2022-09-27

## 2022-09-17 RX ORDER — LIDOCAINE 50 MG/G
PATCH TOPICAL
Qty: 5 EACH | Refills: 0 | Status: SHIPPED | OUTPATIENT
Start: 2022-09-17

## 2022-09-17 RX ORDER — CEPHALEXIN 500 MG/1
500 CAPSULE ORAL 4 TIMES DAILY
Qty: 28 CAPSULE | Refills: 0 | Status: SHIPPED | OUTPATIENT
Start: 2022-09-17 | End: 2022-09-24

## 2022-09-17 RX ORDER — ACETAMINOPHEN 325 MG/1
650 TABLET ORAL ONCE
Status: COMPLETED | OUTPATIENT
Start: 2022-09-17 | End: 2022-09-17

## 2022-09-17 RX ADMIN — CEPHALEXIN 500 MG: 500 CAPSULE ORAL at 19:02

## 2022-09-17 RX ADMIN — KETOROLAC TROMETHAMINE 15 MG: 30 INJECTION, SOLUTION INTRAMUSCULAR; INTRAVENOUS at 18:31

## 2022-09-17 RX ADMIN — ACETAMINOPHEN 650 MG: 325 TABLET ORAL at 18:31

## 2022-09-17 NOTE — DISCHARGE INSTRUCTIONS
Please continue taking tylenol as needed for pain. Start steroids as prescribed. Take with food and Pepcid to prevent stomach irritation. Steroids can cause elevation of your blood sugar, please monitor closely. Discontinue steroids if blood sugar becomes elevated. Take antibiotic as prescribed to cover for lower leg cellulitis. Please monitor redness and warmth, if this worsens despite antibiotics return to the emergency department. Return to the emergency department if patient develops fevers, vomiting and cannot keep fluids down, worsening symptoms or any other concerns.

## 2022-09-17 NOTE — ED NOTES
Patient discharged from ED by provider. Discharge instructions provided to daughter. Discharge instructions reviewed with daughter and all questions answered.

## 2022-09-17 NOTE — ED TRIAGE NOTES
Patient arrives to ED complaining of right leg/buttocks pain since last week. Also reports bilateral leg swelling and bilateral leg weakness. Patient seen at [facility] on Wednesday.

## 2022-09-17 NOTE — ED PROVIDER NOTES
HPI     Date of Service:  9/17/2022    Patient:  Yuliana West    Chief Complaint:  Leg Swelling and Lethargy       HPI:  Yuliana West is a 78 y.o.  female with a past medical history of hypertension, hypothyroidism, diabetes who presents for evaluation of multiple medical concerns. Per patient's daughter, patient began complaining of right-sided low back pain with radiation down her right leg on 9/13. She was subsequently seen at [facility] on 9/14 for her symptoms. Patient's daughter states she underwent work-up with CT imaging, was told she had degenerative joint disease and discharged home on oxycodone. Patient's daughter reports she has had had continued pain and decreased ambulatory status due to her pain. Daughter notes she has had increased fatigue and tiredness. No other known recent illness including fevers, chills, cough, congestion, nausea, vomiting or diarrhea. Patient denies any chest pain abdominal pain. Endorses pain over her right buttock region with radiation down her leg.       Past Medical History:   Diagnosis Date    Cancer ([address]     skin    Diabetes ([facility] [address]     Herniated cervical disc     Hypertension     Hypothyroidism 01/01/1993       Past Surgical History:   Procedure Laterality Date    HX ORTHOPAEDIC      rotator cuff repair- left    HX PARTIAL THYROIDECTOMY  1993         Family History:   Problem Relation Age of Onset    No Known Problems Mother     Diabetes Father     Diabetes Sister     Cancer Brother         Prostate    Hypertension Brother     Diabetes Maternal Aunt        Social History     Socioeconomic History    Marital status:      Spouse name: Not on file    Number of children: Not on file    Years of education: Not on file    Highest education level: Not on file   Occupational History    Not on file   Tobacco Use    Smoking status: Never    Smokeless tobacco: Never   Vaping Use    Vaping Use: Never used   Substance and Sexual Activity    Alcohol use: No    Drug use: No    Sexual activity: Not Currently   Other Topics Concern    Not on file   Social History Narrative    Not on file     Social Determinants of Health     Financial Resource Strain: Not on file   Food Insecurity: Not on file   Transportation Needs: Not on file   Physical Activity: Not on file   Stress: Not on file   Social Connections: Not on file   Intimate Partner Violence: Not on file   Housing Stability: Not on file         ALLERGIES: Patient has no known allergies. Review of Systems   Constitutional:  Positive for fatigue. Negative for chills and fever. HENT:  Negative for congestion and rhinorrhea. Eyes:  Negative for discharge and redness. Respiratory:  Negative for cough and shortness of breath. Cardiovascular:  Negative for chest pain. Gastrointestinal:  Negative for abdominal pain, diarrhea, nausea and vomiting. Genitourinary:  Negative for dysuria and hematuria. Musculoskeletal:  Positive for back pain. Negative for neck pain. Skin:  Negative for rash and wound. Neurological:  Positive for weakness. Negative for speech difficulty. Psychiatric/Behavioral:  Negative for agitation and confusion. Vitals:    09/17/22 1250   BP: (!) 152/72   Pulse: 94   Resp: 18   Temp: 97.7 °F (36.5 °C)   SpO2: 100%            Physical Exam  Vitals and nursing note reviewed. Constitutional:       General: She is not in acute distress. Appearance: Normal appearance. She is not ill-appearing or toxic-appearing. Comments: Elderly, thin   HENT:      Head: Normocephalic. Eyes:      Extraocular Movements: Extraocular movements intact. Conjunctiva/sclera: Conjunctivae normal.   Cardiovascular:      Rate and Rhythm: Normal rate and regular rhythm. Pulses: Normal pulses. Heart sounds: Normal heart sounds. Pulmonary:      Effort: Pulmonary effort is normal. No respiratory distress. Breath sounds: Normal breath sounds. Abdominal:      General: Abdomen is flat. Palpations: Abdomen is soft. Tenderness: There is no abdominal tenderness. Musculoskeletal:         General: Normal range of motion. Right lower le+ Edema present. Left lower le+ Edema present. Comments: LLE with mild warmth and erythema to the anterior shin    Skin:     General: Skin is warm and dry. Capillary Refill: Capillary refill takes less than 2 seconds. Neurological:      General: No focal deficit present. Mental Status: She is alert and oriented to person, place, and time. Psychiatric:         Mood and Affect: Mood normal.         Behavior: Behavior normal.        MDM  Number of Diagnoses or Management Options  Cellulitis of right lower extremity  Sciatica of right side  Diagnosis management comments:     DECISION MAKING:  Gale Knapp is a 78 y.o. female who comes in as above. On arrival patient is afebrile. Vital signs notable for elevated blood pressure at 152/72, otherwise stable. On my examination she is resting comfortably in no acute distress. On lower extremity examination she has bilateral 1+ edema. There is mild erythema and warmth over the left anterior shin. She has tenderness palpation over the right sciatic notch, no midline T or L-spine tenderness palpation or step-offs. No trauma. She had recent negative CT of the pelvis and lumbar spine at outside hospital on . Differential diagnosis includes, but not limited to, sciatica, DJD, UTI, cellulitis, DVT. We will treat patient's pain with Tylenol and Toradol. Ultrasound duplex of the bilateral lower extremities were performed and negative for DVT. At this time inspection likely has mild cellulitis of the left lower extremity therefore will cover with antibiotics. Suspect swelling is more likely due to dependent edema as patient has been less active than normal due to her back pain. Her laboratory work is unremarkable. No leukocytosis or anemia. Glucose is elevated at 193. Electrolytes are stable. Creatinine within normal limits. BNP within normal limits. I discussed results thus far with patient's daughter. Discussed plan to treat with course of steroids for likely sciatica, I did discuss the risks of hyperglycemia, psychosis, ulcers and instructed on monitoring patient's blood sugars closely, will also prescribe Pepcid to prevent ulcers and monitoring patient mental status at home. Daughter is in agreement with this plan. She will also be given a prescription for Lidoderm patches. Prescription for Keflex for lower extremity cellulitis. UA is pending at this time. Amount and/or Complexity of Data Reviewed  Clinical lab tests: reviewed      ED Course as of 09/17/22 1842   Sat Sep 17, 2022   1323   ED EKG interpretation:  Rhythm: normal sinus rhythm. Rate (approx.): 97. Axis: normal.  ST segment:  No concerning ST elevations or depressions. This EKG was interpreted by Joe Villa MD,ED Provider.  [JM]      ED Course User Index  [JM] Estrella Minor MD       Procedures        LABS:  Recent Results (from the past 6 hour(s))   EKG, 12 LEAD, INITIAL    Collection Time: 09/17/22  1:01 PM   Result Value Ref Range    Ventricular Rate 97 BPM    Atrial Rate 97 BPM    P-R Interval 174 ms    QRS Duration 124 ms    Q-T Interval 346 ms    QTC Calculation (Bezet) 439 ms    Calculated P Axis 61 degrees    Calculated R Axis 0 degrees    Calculated T Axis 13 degrees    Diagnosis       Normal sinus rhythm  RSR' or QR pattern in V1 suggests right ventricular conduction delay  Inferior infarct , age undetermined  T wave abnormality, consider anterior ischemia  Abnormal ECG  No previous ECGs available     CBC WITH AUTOMATED DIFF    Collection Time: 09/17/22  1:15 PM   Result Value Ref Range    WBC 9.6 3.6 - 11.0 K/uL    RBC 4.15 3.80 - 5.20 M/uL    HGB 12.7 11.5 - 16.0 g/dL    HCT 36.9 35.0 - 47.0 %    MCV 88.9 80.0 - 99.0 FL    MCH 30.6 26.0 - 34.0 PG    MCHC 34.4 30.0 - 36.5 g/dL RDW 14.5 11.5 - 14.5 %    PLATELET 132 808 - 843 K/uL    MPV 11.9 8.9 - 12.9 FL    NRBC 0.0 0  WBC    ABSOLUTE NRBC 0.00 0.00 - 0.01 K/uL    NEUTROPHILS 79 (H) 32 - 75 %    LYMPHOCYTES 12 12 - 49 %    MONOCYTES 9 5 - 13 %    EOSINOPHILS 0 0 - 7 %    BASOPHILS 0 0 - 1 %    IMMATURE GRANULOCYTES 0 0.0 - 0.5 %    ABS. NEUTROPHILS 7.5 1.8 - 8.0 K/UL    ABS. LYMPHOCYTES 1.1 0.8 - 3.5 K/UL    ABS. MONOCYTES 0.9 0.0 - 1.0 K/UL    ABS. EOSINOPHILS 0.0 0.0 - 0.4 K/UL    ABS. BASOPHILS 0.0 0.0 - 0.1 K/UL    ABS. IMM. GRANS. 0.0 0.00 - 0.04 K/UL    DF AUTOMATED     SAMPLES BEING HELD    Collection Time: 09/17/22  1:15 PM   Result Value Ref Range    SAMPLES BEING HELD 1RED 1BLU     COMMENT        Add-on orders for these samples will be processed based on acceptable specimen integrity and analyte stability, which may vary by analyte. METABOLIC PANEL, COMPREHENSIVE    Collection Time: 09/17/22  2:24 PM   Result Value Ref Range    Sodium 141 136 - 145 mmol/L    Potassium 3.7 3.5 - 5.1 mmol/L    Chloride 108 97 - 108 mmol/L    CO2 29 21 - 32 mmol/L    Anion gap 4 (L) 5 - 15 mmol/L    Glucose 193 (H) 65 - 100 mg/dL    BUN 35 (H) 6 - 20 MG/DL    Creatinine 0.71 0.55 - 1.02 MG/DL    BUN/Creatinine ratio 49 (H) 12 - 20      GFR est AA >60 >60 ml/min/1.73m2    GFR est non-AA >60 >60 ml/min/1.73m2    Calcium 9.9 8.5 - 10.1 MG/DL    Bilirubin, total 0.4 0.2 - 1.0 MG/DL    ALT (SGPT) 40 12 - 78 U/L    AST (SGOT) 33 15 - 37 U/L    Alk.  phosphatase 68 45 - 117 U/L    Protein, total 7.9 6.4 - 8.2 g/dL    Albumin 3.4 (L) 3.5 - 5.0 g/dL    Globulin 4.5 (H) 2.0 - 4.0 g/dL    A-G Ratio 0.8 (L) 1.1 - 2.2     NT-PRO BNP    Collection Time: 09/17/22  2:24 PM   Result Value Ref Range    NT pro-BNP 40 <450 PG/ML        IMAGING:  DUPLEX LOWER EXT VENOUS BILAT    (Results Pending)         Medications During Visit:  Medications   cephALEXin (KEFLEX) capsule 500 mg (has no administration in time range)   ketorolac (TORADOL) injection 15 mg (15 mg IntraVENous Given 9/17/22 1831)   acetaminophen (TYLENOL) tablet 650 mg (650 mg Oral Given 9/17/22 1831)         IMPRESSION:  1. Sciatica of right side    2. Cellulitis of right lower extremity        DISPOSITION:  Discharged      Current Discharge Medication List        START taking these medications    Details   predniSONE (DELTASONE) 20 mg tablet Take 2 Tablets by mouth daily for 5 days. With Breakfast  Qty: 10 Tablet, Refills: 0  Start date: 9/17/2022, End date: 9/22/2022      cephALEXin (Keflex) 500 mg capsule Take 1 Capsule by mouth four (4) times daily for 7 days. Qty: 28 Capsule, Refills: 0  Start date: 9/17/2022, End date: 9/24/2022      famotidine (Pepcid) 20 mg tablet Take 1 Tablet by mouth two (2) times a day for 10 days. Qty: 20 Tablet, Refills: 0  Start date: 9/17/2022, End date: 9/27/2022      lidocaine (Lidoderm) 5 % Apply patch to the affected area for 12 hours a day and remove for 12 hours a day. Qty: 5 Each, Refills: 0  Start date: 9/17/2022              Follow-up Information       Follow up With Specialties Details Why Contact Info    Kylee Castro MD Family Medicine Schedule an appointment as soon as possible for a visit   [address] [place] [address]  [phone]                The patient is asked to follow-up with their primary care provider in the next several days. They are to call tomorrow for an appointment. The patient is asked to return promptly for any increased concerns or worsening of symptoms. They can return to this emergency department or any other emergency department.

## 2022-09-18 LAB
ATRIAL RATE: 97 BPM
CALCULATED P AXIS, ECG09: 61 DEGREES
CALCULATED R AXIS, ECG10: 0 DEGREES
CALCULATED T AXIS, ECG11: 13 DEGREES
DIAGNOSIS, 93000: NORMAL
P-R INTERVAL, ECG05: 174 MS
Q-T INTERVAL, ECG07: 346 MS
QRS DURATION, ECG06: 124 MS
QTC CALCULATION (BEZET), ECG08: 439 MS
VENTRICULAR RATE, ECG03: 97 BPM

## 2022-09-18 RX ORDER — BLOOD SUGAR DIAGNOSTIC
STRIP MISCELLANEOUS
Qty: 200 STRIP | Refills: 3 | Status: SHIPPED | OUTPATIENT
Start: 2022-09-18

## 2022-09-19 NOTE — ED PROVIDER NOTES
EMERGENCY DEPARTMENT HISTORY AND PHYSICAL EXAM      Date: 9/14/2022  Patient Name: Leticia Araiza    History of Presenting Illness     Chief Complaint   Patient presents with    Hip Pain     Pain at right hip since Sat, worse today. Pt and EMS report difficulty walking at home needed to use a walker    Back Pain     Low back pain started since last Sattoday      Leg Swelling     Bilateral leg swelling for 2 days. History Provided By: Patient    HPI: Leticia Araiza, 78 y.o. female with PMHx as noted below presents emergency department chief complaint of back pain. Patient reports waking up Saturday with pain in her right lower back, right hip. Describes the pain as a dull aching pain that radiates down her right leg. Pain is intermittent and seems to only be with movement/ambulation, having no pain while laying in bed still. Patient denies any acute trauma but does note spending significant time working in her garden the day prior. Also reports some mild swelling of her ankles bilaterally. There is been no bowel/bladder dysfunction, leg weakness/numbness, fevers or chills. Pt denies any other alleviating or exacerbating factors. Additionally, pt specifically denies any recent fever, chills, headache, nausea, vomiting, abdominal pain, CP, SOB, lightheadedness, dizziness, numbness, weakness,  heart palpitations, urinary sxs, diarrhea, constipation, melena, hematochezia, cough, or congestion. PCP: Brian Ontiveros MD    Current Outpatient Medications   Medication Sig Dispense Refill    glucose blood VI test strips (Accu-Chek Shaniqua Plus test strp) strip USE TO CHECK BLOOD SUGAR TWICE DAILY 200 Strip 3    predniSONE (DELTASONE) 20 mg tablet Take 2 Tablets by mouth daily for 5 days. With Breakfast 10 Tablet 0    cephALEXin (Keflex) 500 mg capsule Take 1 Capsule by mouth four (4) times daily for 7 days. 28 Capsule 0    famotidine (Pepcid) 20 mg tablet Take 1 Tablet by mouth two (2) times a day for 10 days.  21 Tablet 0    lidocaine (Lidoderm) 5 % Apply patch to the affected area for 12 hours a day and remove for 12 hours a day. 5 Each 0    glipiZIDE (GLUCOTROL) 5 mg tablet TAKE 1 AND 1/2 TABLETS BY MOUTH DAILY 135 Tablet 3    levothyroxine (SYNTHROID) 25 mcg tablet TAKE 1 TABLET BY MOUTH BEFORE BREAKFAST 90 Tablet 3    Blood-Glucose Meter monitoring kit Check blood glucose daily. 1 Kit 1    lancets misc Check blood glucose daily. 1 Each 11    glucose blood VI test strips (ASCENSIA AUTODISC VI, ONE TOUCH ULTRA TEST VI) strip Check blood glucose daily. 100 Strip 1    Accu-Chek Shaniqua Plus test strp strip USE TO CHECK BLOOD SUGAR TWICE DAILY AS DIRECTED 100 Strip 2       Past History     Past Medical History:  Past Medical History:   Diagnosis Date    Cancer ([facility] [address]     skin    Diabetes ([facility] [address]     Herniated cervical disc     Hypertension     Hypothyroidism 01/01/1993       Past Surgical History:  Past Surgical History:   Procedure Laterality Date    HX ORTHOPAEDIC      rotator cuff repair- left    HX PARTIAL THYROIDECTOMY  1993       Family History:  Family History   Problem Relation Age of Onset    No Known Problems Mother     Diabetes Father     Diabetes Sister     Cancer Brother         Prostate    Hypertension Brother     Diabetes Maternal Aunt        Social History:  Social History     Tobacco Use    Smoking status: Never    Smokeless tobacco: Never   Vaping Use    Vaping Use: Never used   Substance Use Topics    Alcohol use: No    Drug use: No       Allergies:  No Known Allergies      Review of Systems   Review of Systems  Constitutional: Negative for fever, chills, and fatigue. HENT: Negative for congestion, sore throat, rhinorrhea, sneezing and neck stiffness   Eyes: Negative for discharge and redness.    Respiratory: Negative for  shortness of breath, wheezing   Cardiovascular: Negative for chest pain, palpitations   Gastrointestinal: Negative for nausea, vomiting, abdominal pain, constipation, diarrhea and blood in stool.   Genitourinary: Negative for dysuria, hematuria, flank pain, decreased urine volume, discharge,   Musculoskeletal: Negative for myalgias or joint pain . Skin: Negative for rash or lesions . Neurological: Negative weakness, light-headedness, numbness and headaches. Physical Exam   Physical Exam    GENERAL: alert and oriented, no acute distress  EYES: PEERL, No injection, discharge or icterus. ENT: Mucous membranes pink and moist.  NECK: Supple  LUNGS: Airway patent. Non-labored respirations. Breath sounds clear with good air entry bilaterally. HEART: Regular rate and rhythm. Trace pitting edema bilateral lower extremities  ABDOMEN: Non-distended and non-tender, without guarding or rebound. SKIN:  warm, dry  MSK/EXTREMITIES: Without tenderness or deformity, symmetric with normal ROM. There is some right paralumbar muscle tenderness, no significant midline, bilateral palpable DP pulses tenderness  NEUROLOGICAL: Alert, oriented. Strength 5/5 bilateral lower extremities, sensation intact and equal throughout to light touch in her lower extremities      Diagnostic Study Results     Labs -   No results found for this or any previous visit (from the past 12 hour(s)). Radiologic Studies -   CT PELV WO CONT   Final Result   1. Osteopenia and DJD. No acute fracture         CT SPINE LUMB WO CONT   Final Result   No acute bony abnormality of the lumbar spine. Multilevel   degenerative change. CT Results  (Last 48 hours)      None          CXR Results  (Last 48 hours)      None              Medical Decision Making     Luis CAUSEY MD am the first provider for this patient and am the attending of record for this patient encounter. I reviewed the vital signs, available nursing notes, past medical history, past surgical history, family history and social history. Vital Signs-Reviewed the patient's vital signs.         Records Reviewed: Nursing Notes and Old Medical Records    Provider Notes (Medical Decision Making): On presentation, the patient is well appearing, in no acute distress with normal vital signs. Presenting with several days of right-sided lower back pain radiating to her right hip and leg. Based on my history and exam the differential diagnosis for this patient includes fracture, degenerative changes, dislocation, myelitis, discitis, cauda equina, epidural abscess. CT scan of the lumbar spine and pelvis showed significant osteopenia and degenerative changes without any other acute findings. Patient is having no significant neurologic deficits that would warrant emergent MRI at this time. Patient was given pain medication in the ED with some improvement. Was able to ambulate although still having some pain. Discussed admission with patient and daughter due to her difficulty with ambulation however patient would like to be discharged home. They are aware they can return the emergency department at any time and her symptoms are to worsen. ED Course:   Initial assessment performed. The patients presenting problems have been discussed, and they are in agreement with the care plan formulated and outlined with them. I have encouraged them to ask questions as they arise throughout their visit. Medications   ketorolac (TORADOL) injection 15 mg (15 mg IntraVENous Given 9/14/22 2149)   oxyCODONE IR (ROXICODONE) tablet 5 mg (5 mg Oral Given 9/14/22 2149)         PROGRESS  Jennifer Chun's  results have been reviewed with her. She has been counseled regarding her diagnosis. She verbally conveys understanding and agreement of the signs, symptoms, diagnosis, treatment and prognosis and additionally agrees to follow up as recommended with Dr. Danielle Montanez MD in 24 - 48 hours. She also agrees with the care-plan and conveys that all of her questions have been answered.   I have also put together some discharge instructions for her that include: 1) educational information regarding their diagnosis, 2) how to care for their diagnosis at home, as well a 3) list of reasons why they would want to return to the ED prior to their follow-up appointment, should their condition change. Disposition:  home    PLAN:  1. Discharge Medication List as of 9/14/2022 11:07 PM        START taking these medications    Details   oxyCODONE IR (Roxicodone) 5 mg immediate release tablet Take 1 Tablet by mouth every six (6) hours as needed for Pain (severe pain) for up to 3 days. Max Daily Amount: 20 mg., Normal, Disp-10 Tablet, R-0           CONTINUE these medications which have NOT CHANGED    Details   glipiZIDE (GLUCOTROL) 5 mg tablet TAKE 1 AND 1/2 TABLETS BY MOUTH DAILY, Normal, Disp-135 Tablet, R-3      levothyroxine (SYNTHROID) 25 mcg tablet TAKE 1 TABLET BY MOUTH BEFORE BREAKFAST, Normal, Disp-90 Tablet, R-3      Blood-Glucose Meter monitoring kit Check blood glucose daily. , Normal, Disp-1 Kit, R-1      lancets misc Check blood glucose daily. , Normal, Disp-1 Each, R-11      !! glucose blood VI test strips (ASCENSIA AUTODISC VI, ONE TOUCH ULTRA TEST VI) strip Check blood glucose daily. , Normal, Disp-100 Strip, R-1      !! Accu-Chek Shaniqua Plus test strp strip USE TO CHECK BLOOD SUGAR TWICE DAILY AS DIRECTED, Normal, Disp-100 Strip, R-2      !! glucose blood VI test strips (ACCU-CHEK SHANIQUA PLUS TEST STRP) strip USE TO CHECK BLOOD SUGAR TWICE DAILY, Normal, Disp-150 Strip, R-0       !! - Potential duplicate medications found. Please discuss with provider.         2.   Follow-up Information       Follow up With Specialties Details Why Contact Info    Santo Davis MD Family Medicine Schedule an appointment as soon as possible for a visit in 2 days  [address] [facility]  [phone]      [facility] EMERGENCY DEPT Emergency Medicine  If symptoms worsen [address] [facility] [address]   [address] [place] [address]    [address]   Schedule an appointment as soon as possible for a visit   [address] [place] [address]  [phone]          Return to ED if worse     Diagnosis     Clinical Impression:   1. Osteoarthritis of lumbar spine, unspecified spinal osteoarthritis complication status    2. Osteoarthritis of both hips, unspecified osteoarthritis type        Please note that this dictation was completed with Dragon, computer voice recognition software. Quite often unanticipated grammatical, syntax, homophones, and other interpretive errors are inadvertently transcribed by the computer software. Please disregard these errors. Additionally, please excuse any errors that have escaped final proofreading.

## 2022-09-20 LAB
BACTERIA SPEC CULT: ABNORMAL
CC UR VC: ABNORMAL
SERVICE CMNT-IMP: ABNORMAL

## 2022-11-28 ENCOUNTER — TELEPHONE (OUTPATIENT)
Dept: INTERNAL MEDICINE CLINIC | Age: 82
End: 2022-11-28

## 2022-11-28 NOTE — TELEPHONE ENCOUNTER
Triage call transferred from [facility] ([facility])  Two pt identifiers confirmed.     ER x2 in September, diagnosed with sciatica and swelling of BLE  Legs and feet swelling continues to be problematic, swelling does resolve after laying down    Patient needs appt but can only come in afternoons    Offered and accepted appt on 12/5/22 @ 2:20 pm

## 2022-12-05 ENCOUNTER — OFFICE VISIT (OUTPATIENT)
Dept: INTERNAL MEDICINE CLINIC | Age: 82
End: 2022-12-05
Payer: MEDICARE

## 2022-12-05 VITALS
RESPIRATION RATE: 16 BRPM | WEIGHT: 120 LBS | HEIGHT: 63 IN | DIASTOLIC BLOOD PRESSURE: 66 MMHG | TEMPERATURE: 97.6 F | SYSTOLIC BLOOD PRESSURE: 135 MMHG | HEART RATE: 96 BPM | BODY MASS INDEX: 21.26 KG/M2 | OXYGEN SATURATION: 98 %

## 2022-12-05 DIAGNOSIS — E11.9 TYPE 2 DIABETES MELLITUS WITHOUT COMPLICATION, WITHOUT LONG-TERM CURRENT USE OF INSULIN (HCC): ICD-10-CM

## 2022-12-05 DIAGNOSIS — G89.29 CHRONIC LOW BACK PAIN WITH SCIATICA, SCIATICA LATERALITY UNSPECIFIED, UNSPECIFIED BACK PAIN LATERALITY: ICD-10-CM

## 2022-12-05 DIAGNOSIS — I10 HYPERTENSION, ESSENTIAL, BENIGN: Primary | ICD-10-CM

## 2022-12-05 DIAGNOSIS — B96.20 E-COLI UTI: ICD-10-CM

## 2022-12-05 DIAGNOSIS — M54.40 CHRONIC LOW BACK PAIN WITH SCIATICA, SCIATICA LATERALITY UNSPECIFIED, UNSPECIFIED BACK PAIN LATERALITY: ICD-10-CM

## 2022-12-05 DIAGNOSIS — N39.0 E-COLI UTI: ICD-10-CM

## 2022-12-05 PROCEDURE — G8400 PT W/DXA NO RESULTS DOC: HCPCS | Performed by: FAMILY MEDICINE

## 2022-12-05 PROCEDURE — G8420 CALC BMI NORM PARAMETERS: HCPCS | Performed by: FAMILY MEDICINE

## 2022-12-05 PROCEDURE — G8752 SYS BP LESS 140: HCPCS | Performed by: FAMILY MEDICINE

## 2022-12-05 PROCEDURE — G8536 NO DOC ELDER MAL SCRN: HCPCS | Performed by: FAMILY MEDICINE

## 2022-12-05 PROCEDURE — 1090F PRES/ABSN URINE INCON ASSESS: CPT | Performed by: FAMILY MEDICINE

## 2022-12-05 PROCEDURE — G8510 SCR DEP NEG, NO PLAN REQD: HCPCS | Performed by: FAMILY MEDICINE

## 2022-12-05 PROCEDURE — G8427 DOCREV CUR MEDS BY ELIG CLIN: HCPCS | Performed by: FAMILY MEDICINE

## 2022-12-05 PROCEDURE — 1101F PT FALLS ASSESS-DOCD LE1/YR: CPT | Performed by: FAMILY MEDICINE

## 2022-12-05 PROCEDURE — 99214 OFFICE O/P EST MOD 30 MIN: CPT | Performed by: FAMILY MEDICINE

## 2022-12-05 PROCEDURE — G8754 DIAS BP LESS 90: HCPCS | Performed by: FAMILY MEDICINE

## 2022-12-05 RX ORDER — LANCETS
EACH MISCELLANEOUS
Qty: 1 EACH | Refills: 11 | Status: SHIPPED | OUTPATIENT
Start: 2022-12-05

## 2022-12-05 NOTE — PROGRESS NOTES
1. \"Have you been to the ER, urgent care clinic since your last visit? Hospitalized since your last visit? \" Yes ER, [facility] and [facility], 10/22,hip, buttocks,legs and feet swelling    2. \"Have you seen or consulted any other health care providers outside of the [address] [place] [address] since your last visit? \" No     3. For patients aged 39-70: Has the patient had a colonoscopy / FIT/ Cologuard? NA - based on age      If the patient is female:    4. For patients aged 41-77: Has the patient had a mammogram within the past 2 years? NA - based on age or sex      11. For patients aged 21-65: Has the patient had a pap smear?  NA - based on age or sex

## 2022-12-05 NOTE — PROGRESS NOTES
SUBJECTIVE:   Ms. Maite Hernandez is a 78 y.o. female who is here for follow up of routine medical issues. Pt presented to the ED on 9/17/2022 with complains of right leg/buttocks pain and bilateral leg swelling and pain. She reported decreased ambulation with increased fatigue and tiredness. An ultrasound duplex of the bilateral lower extremities was negative for DVT. She was prescribed Keflex for suspected cellulitis of left lower extremity, prednisone for suspected sciatica, lidocaine, and pepcid. Pt presents today for continued bilateral edema in lower extremities. She states that her feet feel \"tight\" especially when trying to move her toes. Pt notes the swelling is less severe in the mornings. However, she notices onset in the afternoon. She denies SOB, CP, or leg pain. Pt denies significant changes to her appetite and states that she eats chicken daily for protein. She states that the remainder of her diet is balanced as well. She is only taking glipizide and synthroid. She occasionally takes OTC ibuprofen for her back. Pt denies taking additional supplements. Pt denies polyuria, dysuria, nocturia, hematuria, or incomplete voiding. At this time, she is otherwise doing well and has brought no other complaints to my attention today. For a list of the medical issues addressed today, see the assessment and plan below. PMH:   Past Medical History:   Diagnosis Date    Cancer ([facility] [address]     skin    Diabetes ([facility] [address]     Herniated cervical disc     Hypertension     Hypothyroidism 01/01/1993     PSH:  has a past surgical history that includes hx partial thyroidectomy (1993) and hx orthopaedic. All: has no allergies on file. MEDS:   Current Outpatient Medications   Medication Sig    lancets misc Check blood glucose daily.     glucose blood VI test strips (Accu-Chek Shaniqua Plus test strp) strip USE TO CHECK BLOOD SUGAR TWICE DAILY    glipiZIDE (GLUCOTROL) 5 mg tablet TAKE 1 AND 1/2 TABLETS BY MOUTH DAILY    levothyroxine (SYNTHROID) 25 mcg tablet TAKE 1 TABLET BY MOUTH BEFORE BREAKFAST    Blood-Glucose Meter monitoring kit Check blood glucose daily. glucose blood VI test strips (ASCENSIA AUTODISC VI, ONE TOUCH ULTRA TEST VI) strip Check blood glucose daily. Accu-Chek Shaniqau Plus test strp strip USE TO CHECK BLOOD SUGAR TWICE DAILY AS DIRECTED    lidocaine (Lidoderm) 5 % Apply patch to the affected area for 12 hours a day and remove for 12 hours a day. (Patient not taking: Reported on 12/5/2022)     No current facility-administered medications for this visit. FH: family history includes Cancer in her brother; Diabetes in her father, maternal aunt, and sister; Hypertension in her brother; No Known Problems in her mother. SH:  reports that she has never smoked. She has never used smokeless tobacco. She reports that she does not drink alcohol and does not use drugs. Review of Systems - History obtained from the patient  General ROS: no fever, chills, fatigue, body aches  Psychological ROS: no change in anxiety, depression, SI/HI  Ophthalmic ROS: no blurred vision, myopia, double vision  ENT ROS: no dysphagia, otalgia, otorrhea, rhinorrhea, post nasal drip  Respiratory ROS: no cough, shortness of breath, or wheezing  Cardiovascular ROS: no chest pain or dyspnea on exertion  Gastrointestinal ROS: no abdominal pain, change in bowel habits, or black or bloody stools  Genito-Urinary ROS: no frequency, urgency, incontinence, dysuria, hematouria  Musculoskeletal ROS: no arthralagia, myalgia  Neurological ROS: no headaches, dizziness, lightheadedness, tremors, seizures  Dermatological ROS: no rash or lesions  Extremities: +edema in lower legs    OBJECTIVE:   Vitals: Visit Vitals  /66   Pulse 96   Temp 97.6 °F (36.4 °C) (Temporal)   Resp 16   Ht 5' 3\" (1.6 m)   Wt 120 lb (54.4 kg)   SpO2 98%   BMI 21.26 kg/m²      Gen: Pleasant 78 y.o.  female in NAD. HEENT: PERRLA. EOMI. OP moist and pink. Neck: Supple. No LAD. HEART: RRR, No M/G/R.      LUNGS: CTAB No W/R. ABDOMEN: S, NT, ND, BS+. EXTREMITIES: +bilateral trace edema isolated to ankle. Varicosity and torturous veins. MUSCULOSKELETAL: Normal ROM, muscle strength 5/5 all groups. NEURO: Alert and oriented x 3. Cranial nerves grossly intact. No focal sensory or motor deficits noted. SKIN: Warm. Dry. No rashes or other lesions noted. ASSESSMENT/ PLAN: Diagnoses and all orders for this visit:    1. Hypertension, essential, benign  -     lancets misc; Check blood glucose daily.  -     METABOLIC PANEL, COMPREHENSIVE; Future  -     CBC WITH AUTOMATED DIFF; Future    2. Type 2 diabetes mellitus without complication, without long-term current use of insulin (HCC)  -     lancets misc; Check blood glucose daily.  -     HEMOGLOBIN A1C WITH EAG; Future  -     MICROALBUMIN, UR, RAND W/ MICROALB/CREAT RATIO; Future    3. Chronic low back pain with sciatica, sciatica laterality unspecified, unspecified back pain laterality  -     REFERRAL TO PHYSICIAL MEDICINE REHAB    4. E-coli UTI  -     URINALYSIS W/ RFLX MICROSCOPIC; Future  -     CULTURE, URINE; Future    1. Hypertension, essential, benign  Hypertension  BP seems to be well controlled. I recommended continuing current dose of medications, eating a low sodium diet, and increasing exercise. Recheck CMP and CBC. -     lancets misc; Check blood glucose daily.  -     METABOLIC PANEL, COMPREHENSIVE; Future  -     CBC WITH AUTOMATED DIFF; Future    2. Type 2 diabetes mellitus without complication, without long-term current use of insulin (HCC)  Pt's blood sugar seems to be well controlled. I advised pt to continue current dose of medications, avoid sugars and starches, and to increase exercise when possible. Recheck hemoglobin A1c and microalbumin. I ordered lancets for her.     -     lancets misc; Check blood glucose daily.  -     HEMOGLOBIN A1C WITH EAG;  Future  -     MICROALBUMIN, UR, RAND W/ MICROALB/CREAT RATIO; Future    3. Chronic low back pain with sciatica, sciatica laterality unspecified, unspecified back pain laterality  I believe her back pain to be a result of sciatica. I placed a referral to the PT.  -     REFERRAL TO PHYSICIAL MEDICINE REHAB    4. E-coli UTI  Pt's urinalysis indicated E.coli UTI. I will do a follow up urinalysis and culture to see if she has recovered. -     URINALYSIS W/ RFLX MICROSCOPIC; Future  -     CULTURE, URINE; Future    Regarding the bilateral edema, I recommended that she reduce salts in her food. She has a habit of eating salty crackers and I asked her to reduce these as well. ICD-10-CM ICD-9-CM    1. Hypertension, essential, benign  I10 401.1 lancets misc      METABOLIC PANEL, COMPREHENSIVE      CBC WITH AUTOMATED DIFF      2. Type 2 diabetes mellitus without complication, without long-term current use of insulin (HCC)  E11.9 250.00 lancets misc      HEMOGLOBIN A1C WITH EAG      MICROALBUMIN, UR, RAND W/ MICROALB/CREAT RATIO      3. Chronic low back pain with sciatica, sciatica laterality unspecified, unspecified back pain laterality  M54.40 724.2 REFERRAL TO PHYSICIAL MEDICINE REHAB    G89.29 724.3      338.29       4. E-coli UTI  N39.0 599.0 URINALYSIS W/ RFLX MICROSCOPIC    B96.20 041.49 CULTURE, URINE               I have reviewed the patient's medications and risks/side effects/benefits were discussed. Diagnosis(-es) explained to patient and questions answered. Literature provided where appropriate.      Written by Moon Rojas, as dictated by Huan Sands MD.

## 2022-12-06 LAB
ALBUMIN SERPL-MCNC: 3.8 G/DL (ref 3.5–5)
ALBUMIN/GLOB SERPL: 1.2 {RATIO} (ref 1.1–2.2)
ALP SERPL-CCNC: 70 U/L (ref 45–117)
ALT SERPL-CCNC: 29 U/L (ref 12–78)
ANION GAP SERPL CALC-SCNC: 5 MMOL/L (ref 5–15)
APPEARANCE UR: ABNORMAL
AST SERPL-CCNC: 14 U/L (ref 15–37)
BASOPHILS # BLD: 0 K/UL (ref 0–0.1)
BASOPHILS NFR BLD: 1 % (ref 0–1)
BILIRUB SERPL-MCNC: 0.2 MG/DL (ref 0.2–1)
BILIRUB UR QL: NEGATIVE
BUN SERPL-MCNC: 26 MG/DL (ref 6–20)
BUN/CREAT SERPL: 43 (ref 12–20)
CALCIUM SERPL-MCNC: 9.6 MG/DL (ref 8.5–10.1)
CHLORIDE SERPL-SCNC: 109 MMOL/L (ref 97–108)
CO2 SERPL-SCNC: 28 MMOL/L (ref 21–32)
COLOR UR: ABNORMAL
CREAT SERPL-MCNC: 0.61 MG/DL (ref 0.55–1.02)
CREAT UR-MCNC: 80.5 MG/DL
DIFFERENTIAL METHOD BLD: NORMAL
EOSINOPHIL # BLD: 0 K/UL (ref 0–0.4)
EOSINOPHIL NFR BLD: 1 % (ref 0–7)
ERYTHROCYTE [DISTWIDTH] IN BLOOD BY AUTOMATED COUNT: 14.3 % (ref 11.5–14.5)
EST. AVERAGE GLUCOSE BLD GHB EST-MCNC: 171 MG/DL
GLOBULIN SER CALC-MCNC: 3.1 G/DL (ref 2–4)
GLUCOSE SERPL-MCNC: 207 MG/DL (ref 65–100)
GLUCOSE UR STRIP.AUTO-MCNC: 500 MG/DL
HBA1C MFR BLD: 7.6 % (ref 4–5.6)
HCT VFR BLD AUTO: 38.1 % (ref 35–47)
HGB BLD-MCNC: 12.2 G/DL (ref 11.5–16)
HGB UR QL STRIP: NEGATIVE
IMM GRANULOCYTES # BLD AUTO: 0 K/UL (ref 0–0.04)
IMM GRANULOCYTES NFR BLD AUTO: 0 % (ref 0–0.5)
KETONES UR QL STRIP.AUTO: NEGATIVE MG/DL
LEUKOCYTE ESTERASE UR QL STRIP.AUTO: NEGATIVE
LYMPHOCYTES # BLD: 1.5 K/UL (ref 0.8–3.5)
LYMPHOCYTES NFR BLD: 23 % (ref 12–49)
MCH RBC QN AUTO: 28.8 PG (ref 26–34)
MCHC RBC AUTO-ENTMCNC: 32 G/DL (ref 30–36.5)
MCV RBC AUTO: 89.9 FL (ref 80–99)
MICROALBUMIN UR-MCNC: 2.11 MG/DL
MICROALBUMIN/CREAT UR-RTO: 26 MG/G (ref 0–30)
MONOCYTES # BLD: 0.5 K/UL (ref 0–1)
MONOCYTES NFR BLD: 8 % (ref 5–13)
NEUTS SEG # BLD: 4.5 K/UL (ref 1.8–8)
NEUTS SEG NFR BLD: 67 % (ref 32–75)
NITRITE UR QL STRIP.AUTO: NEGATIVE
NRBC # BLD: 0 K/UL (ref 0–0.01)
NRBC BLD-RTO: 0 PER 100 WBC
PH UR STRIP: 5.5 [PH] (ref 5–8)
PLATELET # BLD AUTO: 197 K/UL (ref 150–400)
PMV BLD AUTO: 10.9 FL (ref 8.9–12.9)
POTASSIUM SERPL-SCNC: 3.8 MMOL/L (ref 3.5–5.1)
PROT SERPL-MCNC: 6.9 G/DL (ref 6.4–8.2)
PROT UR STRIP-MCNC: NEGATIVE MG/DL
RBC # BLD AUTO: 4.24 M/UL (ref 3.8–5.2)
SODIUM SERPL-SCNC: 142 MMOL/L (ref 136–145)
SP GR UR REFRACTOMETRY: 1.02 (ref 1–1.03)
UROBILINOGEN UR QL STRIP.AUTO: 0.2 EU/DL (ref 0.2–1)
WBC # BLD AUTO: 6.7 K/UL (ref 3.6–11)

## 2022-12-06 NOTE — PROGRESS NOTES
Your urinalysis reveals an elevation in glucose, but no urinary tract infection. Microalbumin-is normal  Your current hgbA1c is lower than your last level of 8.2. Keep up the good work! Continue to work on following a diabetic diet and exercise. Recheck this test: hgbA1c  in  3 months. Continue with current  medications. CBC-Normal red and white blood cell levels. CMP-Normal electrolyte levels except for a elevation in glucose levels, and normal liver function. You have renal insufficiency.

## 2022-12-08 LAB
BACTERIA SPEC CULT: ABNORMAL
BACTERIA SPEC CULT: ABNORMAL
CC UR VC: ABNORMAL
SERVICE CMNT-IMP: ABNORMAL

## 2022-12-11 ENCOUNTER — TELEPHONE (OUTPATIENT)
Dept: INTERNAL MEDICINE CLINIC | Age: 82
End: 2022-12-11

## 2022-12-11 DIAGNOSIS — N30.00 ACUTE CYSTITIS WITHOUT HEMATURIA: Primary | ICD-10-CM

## 2022-12-11 RX ORDER — CIPROFLOXACIN 500 MG/1
500 TABLET ORAL 2 TIMES DAILY
Qty: 14 TABLET | Refills: 0 | Status: SHIPPED | OUTPATIENT
Start: 2022-12-11 | End: 2022-12-18

## 2022-12-12 ENCOUNTER — TELEPHONE (OUTPATIENT)
Dept: INTERNAL MEDICINE CLINIC | Age: 82
End: 2022-12-12

## 2022-12-14 ENCOUNTER — TELEPHONE (OUTPATIENT)
Dept: INTERNAL MEDICINE CLINIC | Age: 82
End: 2022-12-14

## 2022-12-14 NOTE — TELEPHONE ENCOUNTER
Asked to contact patient daughter regarding lab results    Spoke with Rashmi Reese, daughter (HIPAA)  Two pt identifiers confirmed. Reviewed labs resulted on 12/6/22 with daughter including updated results with urine culture address by Dr. Ronald Segovia on 12/11/22 with antibiotics orders    Answered question daughter has.      She would like to speak with Dr. Ronald Segovia

## 2022-12-14 NOTE — PROGRESS NOTES
Called patient. Two patient identifiers verified. Your urinalysis reveals an elevation in glucose, but no urinary tract infection. Microalbumin-is normal   Your current hgbA1c is lower than your last level of 8.2. Keep up the good work! Continue to work on following a diabetic diet and exercise. Recheck this test: hgbA1c  in  3 months. Continue with current  medications.  CBC-Normal red and white blood cell levels. CMP-Normal electrolyte levels except for a elevation in glucose levels, and normal liver function. You have renal insufficiency.

## 2022-12-20 NOTE — TELEPHONE ENCOUNTER
I spoke with this patient on the evening of her call to discuss her concerns and reviewed the lab results.

## 2023-06-10 RX ORDER — GLIPIZIDE 5 MG/1
TABLET ORAL
Qty: 135 TABLET | Refills: 2 | Status: SHIPPED | OUTPATIENT
Start: 2023-06-10

## 2023-06-10 RX ORDER — LEVOTHYROXINE SODIUM 0.03 MG/1
TABLET ORAL
Qty: 90 TABLET | Refills: 2 | Status: SHIPPED | OUTPATIENT
Start: 2023-06-10

## 2023-10-30 ENCOUNTER — TELEPHONE (OUTPATIENT)
Dept: PHARMACY | Facility: CLINIC | Age: 83
End: 2023-10-30

## 2023-10-30 NOTE — TELEPHONE ENCOUNTER
[facility] CLINICAL PHARMACY: ADHERENCE REVIEW  Identified care gap per [employer]: fills at [facility]: Diabetes adherence    ASSESSMENT    DIABETES ADHERENCE    Insurance Records claims through  10.23.23  (Prior Year [address] = not reported; YTD [address] = 78%; Potential Fail Date: 23):   GLIPIZIDE TAB 5 MG last filled on 23 for 90 day supply. Next refill due: 23 -PAST DUE 53 DAYS     Prescribed sig:  TAKE 1 AND 1/2 TABLETS BY MOUTH DAILY    Per Insurer Portal: last filled on 23 for 90 day supply. Per [facility]: last picked up on 23 for 90 day supply. will get 90 day supply ready to  since past due. Billed through [place] ([facility] 2 refills remaining. Lab Results   Component Value Date    LABA1C 7.6 (H) 2022    LABA1C 8.2 (H) 2022    LABA1C 7.3 (H) 2021       PLAN    Per insurer report, LIS-0 - co-pays are based on tiers and patient is subject to coverage gap. The following are interventions that have been identified:   Patient overdue refilling GLIPIZIDE TAB 5 MG and active on home medication list.   Refill/s of GLIPIZIDE TAB 5 MG READY to  at patient's [address] for a $0 co pay. Attempting to reach patient to review. Left message asking for return call. Letter sent to patient.      Last Visit: 22  Next Visit: 23    Marilu Jarrett, [facility] [address] [place] [address]   [phone] option 2     For Pharmacy Admin Tracking Only    Program: Paul in place:  No  Recommendation Provided To: Pharmacy: 1  Intervention Detail: Adherence Monitorin  Intervention Accepted By: Pharmacy: 1  Gap Closed?: No   Time Spent (min): 15

## 2023-11-16 ENCOUNTER — OFFICE VISIT (OUTPATIENT)
Age: 83
End: 2023-11-16
Payer: MEDICARE

## 2023-11-16 VITALS
SYSTOLIC BLOOD PRESSURE: 158 MMHG | RESPIRATION RATE: 20 BRPM | HEART RATE: 99 BPM | OXYGEN SATURATION: 99 % | TEMPERATURE: 97.9 F | WEIGHT: 124 LBS | DIASTOLIC BLOOD PRESSURE: 73 MMHG | HEIGHT: 63 IN | BODY MASS INDEX: 21.97 KG/M2

## 2023-11-16 DIAGNOSIS — I10 ESSENTIAL (PRIMARY) HYPERTENSION: ICD-10-CM

## 2023-11-16 DIAGNOSIS — E11.9 TYPE 2 DIABETES MELLITUS WITHOUT COMPLICATION, WITHOUT LONG-TERM CURRENT USE OF INSULIN (HCC): Primary | ICD-10-CM

## 2023-11-16 DIAGNOSIS — E03.9 HYPOTHYROIDISM, UNSPECIFIED TYPE: ICD-10-CM

## 2023-11-16 PROCEDURE — 3078F DIAST BP <80 MM HG: CPT | Performed by: FAMILY MEDICINE

## 2023-11-16 PROCEDURE — 99214 OFFICE O/P EST MOD 30 MIN: CPT | Performed by: FAMILY MEDICINE

## 2023-11-16 PROCEDURE — 3074F SYST BP LT 130 MM HG: CPT | Performed by: FAMILY MEDICINE

## 2023-11-16 PROCEDURE — 3051F HG A1C>EQUAL 7.0%<8.0%: CPT | Performed by: FAMILY MEDICINE

## 2023-11-16 PROCEDURE — 1123F ACP DISCUSS/DSCN MKR DOCD: CPT | Performed by: FAMILY MEDICINE

## 2023-11-16 RX ORDER — LEVOTHYROXINE SODIUM 0.03 MG/1
TABLET ORAL
Qty: 90 TABLET | Refills: 2 | Status: SHIPPED | OUTPATIENT
Start: 2023-11-16

## 2023-11-16 SDOH — ECONOMIC STABILITY: FOOD INSECURITY: WITHIN THE PAST 12 MONTHS, YOU WORRIED THAT YOUR FOOD WOULD RUN OUT BEFORE YOU GOT MONEY TO BUY MORE.: NEVER TRUE

## 2023-11-16 SDOH — ECONOMIC STABILITY: FOOD INSECURITY: WITHIN THE PAST 12 MONTHS, THE FOOD YOU BOUGHT JUST DIDN'T LAST AND YOU DIDN'T HAVE MONEY TO GET MORE.: NEVER TRUE

## 2023-11-16 SDOH — ECONOMIC STABILITY: INCOME INSECURITY: HOW HARD IS IT FOR YOU TO PAY FOR THE VERY BASICS LIKE FOOD, HOUSING, MEDICAL CARE, AND HEATING?: NOT VERY HARD

## 2023-11-16 SDOH — ECONOMIC STABILITY: HOUSING INSECURITY
IN THE LAST 12 MONTHS, WAS THERE A TIME WHEN YOU DID NOT HAVE A STEADY PLACE TO SLEEP OR SLEPT IN A SHELTER (INCLUDING NOW)?: NO

## 2023-11-16 ASSESSMENT — PATIENT HEALTH QUESTIONNAIRE - PHQ9
1. LITTLE INTEREST OR PLEASURE IN DOING THINGS: 0
2. FEELING DOWN, DEPRESSED OR HOPELESS: 0
SUM OF ALL RESPONSES TO PHQ QUESTIONS 1-9: 0
SUM OF ALL RESPONSES TO PHQ9 QUESTIONS 1 & 2: 0

## 2023-11-16 NOTE — PROGRESS NOTES
1. \"Have you been to the ER, urgent care clinic since your last visit? Hospitalized since your last visit? \" No    2. \"Have you seen or consulted any other health care providers outside of the [address] [place] [address] since your last visit? \" No     3. For patients aged 43-73: Has the patient had a colonoscopy / FIT/ Cologuard? NA - based on age      If the patient is female:    4. For patients aged 43-66: Has the patient had a mammogram within the past 2 years? NA - based on age or sex      11. For patients aged 21-65: Has the patient had a pap smear?  NA - based on age or sex

## 2023-11-16 NOTE — PROGRESS NOTES
SUBJECTIVE:   Ms. Raymond Noble is a 78 y.o. female who is here for follow up of routine medical issues. Pt is not fasting today. HTN: Pt is not taking medication for HTN. Pt's BP in the office today was 151/74 and 158/73 on recheck. Pt has been stressed due to the health of her cat. Pt has gained 4 lbs, her weight was 120 lb on 12/02/22, and is now 124 lb today. DM2: Pt is complaint in taking glipizide 5 mg 1.5 tablets daily. Her last HbA1c was 7.6% on 12/05.2022. Pt is in need of a refill on her levothyroxine and test strips today. PREVENTIVE:   Flu Shot: planning to get  COVID: planing to get    At this time, she is otherwise doing well and has brought no other complaints to my attention today. For a list of the medical issues addressed today, see the assessment and plan below. PMH:   Past Medical History:   Diagnosis Date    Cancer ([address] )     skin    Diabetes ([address])     Herniated cervical disc     Hypertension     Hypothyroidism 01/01/1993     PSH:  has a past surgical history that includes orthopedic surgery and Thyroidectomy, partial (1993). All: has No Known Allergies. MEDS:   Current Outpatient Medications   Medication Sig    blood glucose test strips (ASCENSIA AUTODISC VI;ONE TOUCH ULTRA TEST VI) strip USE TO CHECK BLOOD SUGAR TWICE DAILY AS DIRECTED    levothyroxine (SYNTHROID) 25 MCG tablet TAKE 1 TABLET BY MOUTH BEFORE BREAKFAST    glipiZIDE (GLUCOTROL) 5 MG tablet TAKE 1 AND 1/2 TABLETS BY MOUTH DAILY    Lancets MISC Check blood glucose daily. lidocaine (LIDODERM) 5 % Apply patch to the affected area for 12 hours a day and remove for 12 hours a day. (Patient not taking: Reported on 11/16/2023)     No current facility-administered medications for this visit. FH: family history includes Cancer in her brother; Diabetes in her father, maternal aunt, and sister; Hypertension in her brother; No Known Problems in her mother. SH:  reports that she has never smoked.  She has never

## 2023-11-17 LAB
ALBUMIN SERPL-MCNC: 3.8 G/DL (ref 3.5–5)
ALBUMIN/GLOB SERPL: 1.3 (ref 1.1–2.2)
ALP SERPL-CCNC: 68 U/L (ref 45–117)
ALT SERPL-CCNC: 27 U/L (ref 12–78)
ANION GAP SERPL CALC-SCNC: 5 MMOL/L (ref 5–15)
AST SERPL-CCNC: 17 U/L (ref 15–37)
BASOPHILS # BLD: 0 K/UL (ref 0–0.1)
BASOPHILS NFR BLD: 1 % (ref 0–1)
BILIRUB SERPL-MCNC: 0.2 MG/DL (ref 0.2–1)
BUN SERPL-MCNC: 21 MG/DL (ref 6–20)
BUN/CREAT SERPL: 28 (ref 12–20)
CALCIUM SERPL-MCNC: 9.6 MG/DL (ref 8.5–10.1)
CHLORIDE SERPL-SCNC: 106 MMOL/L (ref 97–108)
CO2 SERPL-SCNC: 28 MMOL/L (ref 21–32)
CREAT SERPL-MCNC: 0.76 MG/DL (ref 0.55–1.02)
DIFFERENTIAL METHOD BLD: ABNORMAL
EOSINOPHIL # BLD: 0 K/UL (ref 0–0.4)
EOSINOPHIL NFR BLD: 0 % (ref 0–7)
ERYTHROCYTE [DISTWIDTH] IN BLOOD BY AUTOMATED COUNT: 13.3 % (ref 11.5–14.5)
EST. AVERAGE GLUCOSE BLD GHB EST-MCNC: 174 MG/DL
GLOBULIN SER CALC-MCNC: 3 G/DL (ref 2–4)
GLUCOSE SERPL-MCNC: 228 MG/DL (ref 65–100)
HBA1C MFR BLD: 7.7 % (ref 4–5.6)
HCT VFR BLD AUTO: 38.4 % (ref 35–47)
HGB BLD-MCNC: 12.7 G/DL (ref 11.5–16)
IMM GRANULOCYTES # BLD AUTO: 0 K/UL (ref 0–0.04)
IMM GRANULOCYTES NFR BLD AUTO: 0 % (ref 0–0.5)
LYMPHOCYTES # BLD: 1 K/UL (ref 0.8–3.5)
LYMPHOCYTES NFR BLD: 15 % (ref 12–49)
MCH RBC QN AUTO: 29.5 PG (ref 26–34)
MCHC RBC AUTO-ENTMCNC: 33.1 G/DL (ref 30–36.5)
MCV RBC AUTO: 89.3 FL (ref 80–99)
MONOCYTES # BLD: 0.4 K/UL (ref 0–1)
MONOCYTES NFR BLD: 7 % (ref 5–13)
NEUTS SEG # BLD: 4.7 K/UL (ref 1.8–8)
NEUTS SEG NFR BLD: 77 % (ref 32–75)
NRBC # BLD: 0 K/UL (ref 0–0.01)
NRBC BLD-RTO: 0 PER 100 WBC
PLATELET # BLD AUTO: 252 K/UL (ref 150–400)
PMV BLD AUTO: 11.1 FL (ref 8.9–12.9)
POTASSIUM SERPL-SCNC: 4 MMOL/L (ref 3.5–5.1)
PROT SERPL-MCNC: 6.8 G/DL (ref 6.4–8.2)
RBC # BLD AUTO: 4.3 M/UL (ref 3.8–5.2)
SODIUM SERPL-SCNC: 139 MMOL/L (ref 136–145)
T4 FREE SERPL-MCNC: 1.2 NG/DL (ref 0.8–1.5)
TSH SERPL DL<=0.05 MIU/L-ACNC: 0.35 UIU/ML (ref 0.36–3.74)
WBC # BLD AUTO: 6.2 K/UL (ref 3.6–11)

## 2024-06-05 RX ORDER — GLIPIZIDE 5 MG/1
TABLET ORAL
Qty: 135 TABLET | Refills: 2 | Status: SHIPPED | OUTPATIENT
Start: 2024-06-05

## 2024-08-19 ENCOUNTER — OFFICE VISIT (OUTPATIENT)
Age: 84
End: 2024-08-19
Payer: MEDICARE

## 2024-08-19 VITALS
DIASTOLIC BLOOD PRESSURE: 70 MMHG | OXYGEN SATURATION: 98 % | SYSTOLIC BLOOD PRESSURE: 160 MMHG | HEART RATE: 89 BPM | TEMPERATURE: 97.6 F | BODY MASS INDEX: 20.55 KG/M2 | WEIGHT: 116 LBS | RESPIRATION RATE: 20 BRPM | HEIGHT: 63 IN

## 2024-08-19 DIAGNOSIS — E78.2 MIXED HYPERLIPIDEMIA: ICD-10-CM

## 2024-08-19 DIAGNOSIS — E11.9 TYPE 2 DIABETES MELLITUS WITHOUT COMPLICATION, WITHOUT LONG-TERM CURRENT USE OF INSULIN (HCC): Primary | ICD-10-CM

## 2024-08-19 DIAGNOSIS — I10 ESSENTIAL (PRIMARY) HYPERTENSION: ICD-10-CM

## 2024-08-19 DIAGNOSIS — H91.90 HEARING LOSS, UNSPECIFIED HEARING LOSS TYPE, UNSPECIFIED LATERALITY: ICD-10-CM

## 2024-08-19 DIAGNOSIS — E04.9 THYROID ENLARGED: ICD-10-CM

## 2024-08-19 DIAGNOSIS — E03.9 HYPOTHYROIDISM, UNSPECIFIED TYPE: ICD-10-CM

## 2024-08-19 LAB
ALBUMIN SERPL-MCNC: 3.8 G/DL (ref 3.5–5)
ALBUMIN/GLOB SERPL: 1.2 (ref 1.1–2.2)
ALP SERPL-CCNC: 74 U/L (ref 45–117)
ALT SERPL-CCNC: 28 U/L (ref 12–78)
ANION GAP SERPL CALC-SCNC: 6 MMOL/L (ref 5–15)
AST SERPL-CCNC: 18 U/L (ref 15–37)
BASOPHILS # BLD: 0 K/UL (ref 0–0.1)
BASOPHILS NFR BLD: 1 % (ref 0–1)
BILIRUB SERPL-MCNC: 0.3 MG/DL (ref 0.2–1)
BUN SERPL-MCNC: 17 MG/DL (ref 6–20)
BUN/CREAT SERPL: 23 (ref 12–20)
CALCIUM SERPL-MCNC: 10.6 MG/DL (ref 8.5–10.1)
CHLORIDE SERPL-SCNC: 107 MMOL/L (ref 97–108)
CHOLEST SERPL-MCNC: 224 MG/DL
CO2 SERPL-SCNC: 28 MMOL/L (ref 21–32)
CREAT SERPL-MCNC: 0.73 MG/DL (ref 0.55–1.02)
DIFFERENTIAL METHOD BLD: NORMAL
EOSINOPHIL # BLD: 0 K/UL (ref 0–0.4)
EOSINOPHIL NFR BLD: 0 % (ref 0–7)
ERYTHROCYTE [DISTWIDTH] IN BLOOD BY AUTOMATED COUNT: 13.4 % (ref 11.5–14.5)
EST. AVERAGE GLUCOSE BLD GHB EST-MCNC: 171 MG/DL
GLOBULIN SER CALC-MCNC: 3.3 G/DL (ref 2–4)
GLUCOSE SERPL-MCNC: 202 MG/DL (ref 65–100)
HBA1C MFR BLD: 7.6 % (ref 4–5.6)
HCT VFR BLD AUTO: 37.9 % (ref 35–47)
HDLC SERPL-MCNC: 61 MG/DL
HDLC SERPL: 3.7 (ref 0–5)
HGB BLD-MCNC: 12.4 G/DL (ref 11.5–16)
IMM GRANULOCYTES # BLD AUTO: 0 K/UL (ref 0–0.04)
IMM GRANULOCYTES NFR BLD AUTO: 0 % (ref 0–0.5)
LDLC SERPL CALC-MCNC: 156 MG/DL (ref 0–100)
LYMPHOCYTES # BLD: 1 K/UL (ref 0.8–3.5)
LYMPHOCYTES NFR BLD: 22 % (ref 12–49)
MCH RBC QN AUTO: 29.5 PG (ref 26–34)
MCHC RBC AUTO-ENTMCNC: 32.7 G/DL (ref 30–36.5)
MCV RBC AUTO: 90 FL (ref 80–99)
MONOCYTES # BLD: 0.4 K/UL (ref 0–1)
MONOCYTES NFR BLD: 9 % (ref 5–13)
NEUTS SEG # BLD: 3.1 K/UL (ref 1.8–8)
NEUTS SEG NFR BLD: 68 % (ref 32–75)
NRBC # BLD: 0 K/UL (ref 0–0.01)
NRBC BLD-RTO: 0 PER 100 WBC
PLATELET # BLD AUTO: 249 K/UL (ref 150–400)
PMV BLD AUTO: 11.1 FL (ref 8.9–12.9)
POTASSIUM SERPL-SCNC: 4.9 MMOL/L (ref 3.5–5.1)
PROT SERPL-MCNC: 7.1 G/DL (ref 6.4–8.2)
RBC # BLD AUTO: 4.21 M/UL (ref 3.8–5.2)
SODIUM SERPL-SCNC: 141 MMOL/L (ref 136–145)
T4 FREE SERPL-MCNC: 1.2 NG/DL (ref 0.8–1.5)
TRIGL SERPL-MCNC: 35 MG/DL
TSH SERPL DL<=0.05 MIU/L-ACNC: 0.36 UIU/ML (ref 0.36–3.74)
VLDLC SERPL CALC-MCNC: 7 MG/DL
WBC # BLD AUTO: 4.6 K/UL (ref 3.6–11)

## 2024-08-19 PROCEDURE — 99214 OFFICE O/P EST MOD 30 MIN: CPT | Performed by: FAMILY MEDICINE

## 2024-08-19 PROCEDURE — 3077F SYST BP >= 140 MM HG: CPT | Performed by: FAMILY MEDICINE

## 2024-08-19 PROCEDURE — 1123F ACP DISCUSS/DSCN MKR DOCD: CPT | Performed by: FAMILY MEDICINE

## 2024-08-19 PROCEDURE — 3078F DIAST BP <80 MM HG: CPT | Performed by: FAMILY MEDICINE

## 2024-08-19 RX ORDER — LISINOPRIL 2.5 MG/1
2.5 TABLET ORAL DAILY
Qty: 30 TABLET | Refills: 3 | Status: SHIPPED | OUTPATIENT
Start: 2024-08-19

## 2024-08-19 ASSESSMENT — PATIENT HEALTH QUESTIONNAIRE - PHQ9
SUM OF ALL RESPONSES TO PHQ QUESTIONS 1-9: 0
SUM OF ALL RESPONSES TO PHQ QUESTIONS 1-9: 0
2. FEELING DOWN, DEPRESSED OR HOPELESS: NOT AT ALL
SUM OF ALL RESPONSES TO PHQ9 QUESTIONS 1 & 2: 0
SUM OF ALL RESPONSES TO PHQ QUESTIONS 1-9: 0
SUM OF ALL RESPONSES TO PHQ QUESTIONS 1-9: 0
1. LITTLE INTEREST OR PLEASURE IN DOING THINGS: NOT AT ALL

## 2024-08-19 NOTE — PROGRESS NOTES
\"Have you been to the ER, urgent care clinic since your last visit?  Hospitalized since your last visit?\"    NO    “Have you seen or consulted any other health care providers outside of [facility] since your last visit?”    NO            Click Here for Release of Records Request  
muscle strength 5/5 all groups.    NEURO: Alert and oriented x 3.  Cranial nerves grossly intact.  No focal sensory or motor deficits noted.   SKIN: Warm. Dry. No rashes or other lesions noted.    ASSESSMENT/ PLAN: Mel was seen today for diabetes and hypertension.    Diagnoses and all orders for this visit:    Type 2 diabetes mellitus without complication, without long-term current use of insulin (HCC)  Last labs were reviewed. I ordered Hgb A1c to recheck levels. Pt is complaint in taking glipizide 5 mg 1.5 tablets daily.  -     Hemoglobin A1C; Future    Essential (primary) hypertension  I prescribed the pt lisinopril 2.5 mg to help bring down her BP numbers. I advised the pt to check her BP 2 hours after taking the medication. She should then check it later in the day to see if levels are still down. We will recheck labs.  -     CBC with Auto Differential; Future  -     Comprehensive Metabolic Panel; Future  -     lisinopril (ZESTRIL) 2.5 MG tablet; Take 1 tablet by mouth daily    Hypothyroidism, unspecified type  We will recheck her TSH and T4 levels. Pt is compliant in taking Synthroid.  -     T4, Free; Future  -     TSH; Future    Mixed hyperlipidemia  We will recheck Lipid Panel. Pt is not fasting today.  -     Lipid Panel; Future    Hearing loss, unspecified hearing loss type, unspecified laterality  I referred the pt to ENT.  -     External Referral To ENT    Thyroid enlarged  Upon physical examination the thyroid felt enlarged. Pt had stopped following up with endocrinologist but pt is complaint in taking her thyroid medication. We will recheck labs.  I ordered US of neck.    -     US HEAD NECK SOFT TISSUE THYROID; Future        I have reviewed the patient's medications and risks/side effects/benefits were discussed. Diagnosis(-es) explained to patient and questions answered. Literature provided where appropriate.     I, Graciela Calabrese, am scribing for and in the presence of Theresa Patrick MD.

## 2024-08-23 ENCOUNTER — HOSPITAL ENCOUNTER (OUTPATIENT)
Facility: HOSPITAL | Age: 84
End: 2024-08-23
Attending: FAMILY MEDICINE
Payer: MEDICARE

## 2024-08-23 DIAGNOSIS — E04.9 THYROID ENLARGED: ICD-10-CM

## 2024-08-23 PROCEDURE — 76536 US EXAM OF HEAD AND NECK: CPT

## 2024-09-01 DIAGNOSIS — E03.9 HYPOTHYROIDISM, UNSPECIFIED TYPE: ICD-10-CM

## 2024-09-03 RX ORDER — LEVOTHYROXINE SODIUM 25 UG/1
TABLET ORAL
Qty: 90 TABLET | Refills: 2 | Status: SHIPPED | OUTPATIENT
Start: 2024-09-03

## 2024-09-10 ENCOUNTER — TELEPHONE (OUTPATIENT)
Age: 84
End: 2024-09-10

## 2024-10-28 ENCOUNTER — TELEPHONE (OUTPATIENT)
Age: 84
End: 2024-10-28

## 2024-10-28 NOTE — TELEPHONE ENCOUNTER
Regarding medication:  lisinopril    Problem:  BP running high still  This mornin/68  Other readings from the month  170/81  152/44  152/74  162/74  145/64  143/84  143/71  142/75  142/75  143/73  144/73  138/73  141/72  129/58  146/64  142/75  143/73  144/73  138/73  141/72  129/58  Experiencing headaches some of the time, more often that is normal.  Thinks adjustment may be needed       Call pt to discuss              Caller confirms readback of documented phone/fax number(s) as correct.

## 2024-10-28 NOTE — TELEPHONE ENCOUNTER
I advised the patient per , \"take  2 tablets of the lisinopril for total of 5 mg daily.  Please advise her to continue keeping a record of her blood pressure over the next week.\"Patient verbalized understanding of information discussed w/ no further questions at this time.

## 2024-11-06 ENCOUNTER — TELEPHONE (OUTPATIENT)
Age: 84
End: 2024-11-06

## 2024-11-06 NOTE — TELEPHONE ENCOUNTER
Pt states has followed instructions of:      States since the change to 2 tablets:  Mon 10/28:  137/69  Tue 10/29 : 136/74  Wed 10/30: 133/76  Thurs 10/31: 129/73  Frid 11/1: 127/65  Started taking 1 tablet:  Sat 11/2: 132/68  Sun 11/3: 128/65  Mon 11/4: 146/81  Tue 11/5: 128/83  Wed 11/6: 146/814      Please advise next steps

## 2024-11-11 DIAGNOSIS — I10 ESSENTIAL (PRIMARY) HYPERTENSION: ICD-10-CM

## 2024-11-11 RX ORDER — LISINOPRIL 2.5 MG/1
2.5 TABLET ORAL DAILY
Qty: 90 TABLET | Refills: 3 | Status: SHIPPED | OUTPATIENT
Start: 2024-11-11

## 2024-11-11 NOTE — TELEPHONE ENCOUNTER
PCP: Theresa Patrick MD    Last appt:   8/19/2024    Future Appointments   Date Time Provider Department Center   1/21/2025  9:50 AM Deon Macias MD [facility]       Requested Prescriptions     Pending Prescriptions Disp Refills    lisinopril (ZESTRIL) 2.5 MG tablet 30 tablet 3     Sig: Take 1 tablet by mouth daily

## 2024-11-11 NOTE — TELEPHONE ENCOUNTER
lisinopril (ZESTRIL) 2.5 MG tablet    [id number] - [facility] [address] - P [phone] - F [phone]

## 2025-03-03 RX ORDER — GLIPIZIDE 5 MG/1
TABLET ORAL
Qty: 135 TABLET | Refills: 2 | Status: SHIPPED | OUTPATIENT
Start: 2025-03-03

## 2025-04-16 ENCOUNTER — TELEPHONE (OUTPATIENT)
Age: 85
End: 2025-04-16

## 2025-04-16 NOTE — TELEPHONE ENCOUNTER
Patient requesting a referral for an ENT. Please mail the referral.    Patient confirmed address.

## 2025-04-21 NOTE — TELEPHONE ENCOUNTER
The patient would like the referral that  gave her last year mailed to her home. I told her I would mail it to her.  Patient verbalized understanding of information discussed w/ no further questions at this time.

## 2025-06-07 DIAGNOSIS — E03.9 HYPOTHYROIDISM, UNSPECIFIED TYPE: ICD-10-CM

## 2025-06-10 RX ORDER — LEVOTHYROXINE SODIUM 25 UG/1
TABLET ORAL
Qty: 90 TABLET | Refills: 2 | Status: SHIPPED | OUTPATIENT
Start: 2025-06-10

## 2025-08-11 ENCOUNTER — OFFICE VISIT (OUTPATIENT)
Age: 85
End: 2025-08-11
Payer: MEDICARE

## 2025-08-11 VITALS
RESPIRATION RATE: 18 BRPM | BODY MASS INDEX: 22.05 KG/M2 | OXYGEN SATURATION: 99 % | WEIGHT: 119.8 LBS | TEMPERATURE: 97.9 F | HEART RATE: 96 BPM | SYSTOLIC BLOOD PRESSURE: 118 MMHG | HEIGHT: 62 IN | DIASTOLIC BLOOD PRESSURE: 66 MMHG

## 2025-08-11 DIAGNOSIS — E78.2 MIXED HYPERLIPIDEMIA: ICD-10-CM

## 2025-08-11 DIAGNOSIS — E03.9 HYPOTHYROIDISM, UNSPECIFIED TYPE: ICD-10-CM

## 2025-08-11 DIAGNOSIS — L29.9 ITCHY SKIN: ICD-10-CM

## 2025-08-11 DIAGNOSIS — I10 ESSENTIAL (PRIMARY) HYPERTENSION: ICD-10-CM

## 2025-08-11 DIAGNOSIS — I49.9 IRREGULAR HEART RATE: Primary | ICD-10-CM

## 2025-08-11 DIAGNOSIS — E11.9 TYPE 2 DIABETES MELLITUS WITHOUT COMPLICATION, WITHOUT LONG-TERM CURRENT USE OF INSULIN (HCC): ICD-10-CM

## 2025-08-11 PROCEDURE — 93005 ELECTROCARDIOGRAM TRACING: CPT | Performed by: FAMILY MEDICINE

## 2025-08-11 PROCEDURE — 99214 OFFICE O/P EST MOD 30 MIN: CPT | Performed by: FAMILY MEDICINE

## 2025-08-11 PROCEDURE — 93010 ELECTROCARDIOGRAM REPORT: CPT | Performed by: FAMILY MEDICINE

## 2025-08-11 PROCEDURE — 1123F ACP DISCUSS/DSCN MKR DOCD: CPT | Performed by: FAMILY MEDICINE

## 2025-08-11 PROCEDURE — 1159F MED LIST DOCD IN RCRD: CPT | Performed by: FAMILY MEDICINE

## 2025-08-11 RX ORDER — TRIAMCINOLONE ACETONIDE 1 MG/G
CREAM TOPICAL
COMMUNITY
Start: 2025-07-10 | End: 2025-08-11 | Stop reason: SDUPTHER

## 2025-08-11 RX ORDER — TRIAMCINOLONE ACETONIDE 1 MG/G
CREAM TOPICAL
Qty: 15 G | Refills: 1 | Status: SHIPPED | OUTPATIENT
Start: 2025-08-11

## 2025-08-11 SDOH — ECONOMIC STABILITY: FOOD INSECURITY: WITHIN THE PAST 12 MONTHS, THE FOOD YOU BOUGHT JUST DIDN'T LAST AND YOU DIDN'T HAVE MONEY TO GET MORE.: NEVER TRUE

## 2025-08-11 SDOH — ECONOMIC STABILITY: FOOD INSECURITY: WITHIN THE PAST 12 MONTHS, YOU WORRIED THAT YOUR FOOD WOULD RUN OUT BEFORE YOU GOT MONEY TO BUY MORE.: NEVER TRUE

## 2025-08-11 ASSESSMENT — PATIENT HEALTH QUESTIONNAIRE - PHQ9
SUM OF ALL RESPONSES TO PHQ QUESTIONS 1-9: 0
1. LITTLE INTEREST OR PLEASURE IN DOING THINGS: NOT AT ALL
SUM OF ALL RESPONSES TO PHQ QUESTIONS 1-9: 0
2. FEELING DOWN, DEPRESSED OR HOPELESS: NOT AT ALL